# Patient Record
Sex: MALE | Race: WHITE
[De-identification: names, ages, dates, MRNs, and addresses within clinical notes are randomized per-mention and may not be internally consistent; named-entity substitution may affect disease eponyms.]

---

## 2018-05-05 ENCOUNTER — HOSPITAL ENCOUNTER (INPATIENT)
Dept: HOSPITAL 63 - GEROPSY | Age: 73
LOS: 15 days | Discharge: ANOTHER HOSPITAL | DRG: 884 | End: 2018-05-20
Attending: PSYCHIATRY & NEUROLOGY | Admitting: PSYCHIATRY & NEUROLOGY
Payer: MEDICARE

## 2018-05-05 VITALS — BODY MASS INDEX: 19.91 KG/M2 | WEIGHT: 131.38 LBS | HEIGHT: 68 IN

## 2018-05-05 VITALS — SYSTOLIC BLOOD PRESSURE: 146 MMHG | DIASTOLIC BLOOD PRESSURE: 90 MMHG

## 2018-05-05 DIAGNOSIS — N40.0: ICD-10-CM

## 2018-05-05 DIAGNOSIS — F51.4: ICD-10-CM

## 2018-05-05 DIAGNOSIS — F41.9: ICD-10-CM

## 2018-05-05 DIAGNOSIS — F01.51: Primary | ICD-10-CM

## 2018-05-05 DIAGNOSIS — F31.64: ICD-10-CM

## 2018-05-05 DIAGNOSIS — G95.9: ICD-10-CM

## 2018-05-05 DIAGNOSIS — F60.3: ICD-10-CM

## 2018-05-05 DIAGNOSIS — F63.9: ICD-10-CM

## 2018-05-05 DIAGNOSIS — G47.00: ICD-10-CM

## 2018-05-05 DIAGNOSIS — K59.09: ICD-10-CM

## 2018-05-05 DIAGNOSIS — L08.9: ICD-10-CM

## 2018-05-05 DIAGNOSIS — G30.0: ICD-10-CM

## 2018-05-05 DIAGNOSIS — F05: ICD-10-CM

## 2018-05-05 DIAGNOSIS — E03.9: ICD-10-CM

## 2018-05-05 DIAGNOSIS — Z79.899: ICD-10-CM

## 2018-05-05 DIAGNOSIS — F02.81: ICD-10-CM

## 2018-05-05 DIAGNOSIS — Z88.0: ICD-10-CM

## 2018-05-05 PROCEDURE — 80178 ASSAY OF LITHIUM: CPT

## 2018-05-05 PROCEDURE — 82947 ASSAY GLUCOSE BLOOD QUANT: CPT

## 2018-05-05 PROCEDURE — 83540 ASSAY OF IRON: CPT

## 2018-05-05 PROCEDURE — 74018 RADEX ABDOMEN 1 VIEW: CPT

## 2018-05-05 PROCEDURE — 36415 COLL VENOUS BLD VENIPUNCTURE: CPT

## 2018-05-05 PROCEDURE — 82533 TOTAL CORTISOL: CPT

## 2018-05-05 PROCEDURE — 87086 URINE CULTURE/COLONY COUNT: CPT

## 2018-05-05 PROCEDURE — 86593 SYPHILIS TEST NON-TREP QUANT: CPT

## 2018-05-05 PROCEDURE — 80048 BASIC METABOLIC PNL TOTAL CA: CPT

## 2018-05-05 PROCEDURE — 83605 ASSAY OF LACTIC ACID: CPT

## 2018-05-05 PROCEDURE — 93005 ELECTROCARDIOGRAM TRACING: CPT

## 2018-05-05 PROCEDURE — 83036 HEMOGLOBIN GLYCOSYLATED A1C: CPT

## 2018-05-05 PROCEDURE — 84436 ASSAY OF TOTAL THYROXINE: CPT

## 2018-05-05 PROCEDURE — 85007 BL SMEAR W/DIFF WBC COUNT: CPT

## 2018-05-05 PROCEDURE — 36600 WITHDRAWAL OF ARTERIAL BLOOD: CPT

## 2018-05-05 PROCEDURE — 82803 BLOOD GASES ANY COMBINATION: CPT

## 2018-05-05 PROCEDURE — 80061 LIPID PANEL: CPT

## 2018-05-05 PROCEDURE — 85025 COMPLETE CBC W/AUTO DIFF WBC: CPT

## 2018-05-05 PROCEDURE — 83550 IRON BINDING TEST: CPT

## 2018-05-05 PROCEDURE — 84443 ASSAY THYROID STIM HORMONE: CPT

## 2018-05-05 PROCEDURE — 87040 BLOOD CULTURE FOR BACTERIA: CPT

## 2018-05-05 PROCEDURE — 83735 ASSAY OF MAGNESIUM: CPT

## 2018-05-05 PROCEDURE — 80053 COMPREHEN METABOLIC PANEL: CPT

## 2018-05-05 PROCEDURE — 81001 URINALYSIS AUTO W/SCOPE: CPT

## 2018-05-05 PROCEDURE — 82306 VITAMIN D 25 HYDROXY: CPT

## 2018-05-05 PROCEDURE — 82607 VITAMIN B-12: CPT

## 2018-05-05 PROCEDURE — 84480 ASSAY TRIIODOTHYRONINE (T3): CPT

## 2018-05-05 RX ADMIN — FAMOTIDINE SCH MG: 20 TABLET ORAL at 18:31

## 2018-05-05 RX ADMIN — BACLOFEN SCH MG: 10 TABLET ORAL at 20:18

## 2018-05-05 RX ADMIN — AMITRIPTYLINE HYDROCHLORIDE SCH MG: 75 TABLET, FILM COATED ORAL at 20:18

## 2018-05-05 RX ADMIN — SENNOSIDES A AND B SCH MG: 8.6 TABLET, FILM COATED ORAL at 20:18

## 2018-05-05 RX ADMIN — LITHIUM CARBONATE SCH MG: 300 TABLET ORAL at 20:18

## 2018-05-05 NOTE — PDOC
Exam


Note:


Shai Note:


Please also refer to the separate dictated note~for this date of service 

dictated separately.~Patient seen individually. Discussed the patient with 

Nursing staff reviewed the chart.~Reviewed interim history and current 

functioning. Reviewed vital signs,~Labs/ Radiology~and current medications 

noted below. Continue current treatment with the changes noted in the dictated 

addendum note





Assessment:


Vital Signs:





 Vital Signs








  Date Time  Temp Pulse Resp B/P (MAP) Pulse Ox O2 Delivery O2 Flow Rate FiO2


 


5/5/18 15:14 98.1 73 18 146/90 (108) 96   











Current Medications:


Meds:





Current Medications


Multi-Ingredient Ointment (Analgesic Balm) 1 bob PRN QID  PRN TP MUSCLE PAIN;  

Start 5/5/18 at 15:00


Acetaminophen (Tylenol) 650 mg PRN Q6HRS  PRN PO PAIN / TEMP;  Start 5/5/18 at 

15:30


Baclofen (Lioresal) 10 mg BID PO  Last administered on 5/5/18at 20:18;  Start 5/ 5/18 at 21:00


Bisacodyl (Dulcolax Supp) 10 mg PRN DAILY  PRN RC CONSTIPATION;  Start 5/5/18 

at 15:30


Docusate Sodium (Colace) 100 mg PRN BID  PRN PO CONSTIPATION;  Start 5/5/18 at 

15:30


Famotidine (Pepcid) 20 mg BIDBFRMEAL PO  Last administered on 5/5/18at 18:31;  

Start 5/5/18 at 16:30


Finasteride (Proscar) 5 mg DAILY PO ;  Start 5/6/18 at 09:00


Haloperidol (Haldol) 0.5 mg PRN TID  PRN PO AGITATION;  Start 5/5/18 at 15:30


Lorazepam (Ativan) 0.5 mg TID PRN  PRN PO ANXIETY / AGITATION;  Start 5/5/18 at 

15:30


Ondansetron HCl (Zofran Odt) 4 mg PRN Q6HRS  PRN PO NAUSEA/VOMITING;  Start 5/5/ 18 at 15:30


Sennosides (Senna) 8.6 mg BID PO  Last administered on 5/5/18at 20:18;  Start 5/ 5/18 at 21:00


Sennosides (Senna) 8.6 mg PRN DAILY  PRN PO CONSTIPATION;  Start 5/5/18 at 15:30


Lithium Carbonate 300 mg BID PO  Last administered on 5/5/18at 20:18;  Start 5/5 /18 at 21:00


Oxycodone HCl (Roxicodone) 5 mg PRN Q6HRS  PRN PO PAIN;  Start 5/5/18 at 15:45


Terazosin HCl (Hytrin) 10 mg DAILY PO ;  Start 5/6/18 at 09:00


Amitriptyline HCl (Elavil) 75 mg QHS PO  Last administered on 5/5/18at 20:18;  

Start 5/5/18 at 21:00


Cetirizine HCl (ZyrTEC) 10 mg DAILY PO ;  Start 5/6/18 at 09:00


Olanzapine (ZyPREXA ZYDIS) 2.5 mg PRN Q2HR  PRN PO Psychosis;  Start 5/5/18 at 

15:45


Multi-Ingredient Ointment (Analgesic Balm) 1 bob PRN QID  PRN TP MUSCLE PAIN;  

Start 5/5/18 at 15:45


Al Hydroxide/Mg Hydroxide (Mylanta Plus Xs) 15 ml PRN AFTMEALHC  PRN PO 

DYSPEPSIA;  Start 5/5/18 at 15:45


Magnesium Hydroxide (Milk Of Magnesia) 2,400 mg PRN QHS  PRN PO CONSTIPATION;  

Start 5/5/18 at 15:45


Enoxaparin Sodium (Lovenox) 40 mg Q24H SQ  Last administered on 5/5/18at 20:19;

  Start 5/5/18 at 21:00





Active Scripts


Active


Reported


Amitriptyline Hcl 75 Mg Tablet 75 Mg PO QHS


Oxycodone Hcl 5 Mg Capsule 5 Mg PO PRN Q6HRS PRN


Lorazepam 0.5 Mg Tablet 0.5 Mg PO TID PRN PRN


Famotidine 20 Mg Tablet 20 Mg PO BIDBFRMEAL


Haloperidol 2 Mg Tablet 0.5 Mg PO PRN TID PRN


Terazosin Hcl 10 Mg Capsule 10 Mg PO DAILY


Ondansetron Odt (Ondansetron) 4 Mg Tab.rapdis 4 Mg PO PRN Q6HRS


Milk Of Magnesia (Magnesium Hydroxide) 2,400 Mg/10 Ml Oral.susp 2,400 Mg PO PRN 

QID PRN


Loratadine 10 Mg Tablet 10 Mg PO 


Lithium Carbonate 300 Mg Capsule 300 Mg PO BID


Baclofen 10 Mg Tablet 10 Mg PO BID


Maalox Maximum Strength Susp (Mag Hydrox/Al Hydrox/Simeth) 355 Ml Oral.susp 15 

Ml PO PRN Q6HRS PRN


Finasteride 5 Mg Tablet 5 Mg PO DAILY


Colace (Docusate Sodium) 100 Mg Capsule 100 Mg PO PRN BID PRN


Senokot (Sennosides) 8.6 Mg Tablet 8.6 Mg PO PRN DAILY PRN


Senokot (Sennosides) 8.6 Mg Tablet 8.6 Mg PO BID


Bisacodyl 10 Mg Supp.rect 10 Mg RC PRN DAILY PRN


Tylenol (Acetaminophen) 325 Mg Tablet 650 Mg PO PRN Q6HRS PRN


I have reviewed the current psychotropics carefully including drug 

interactions.  Risk benefit ratio favors no change other than as noted in my 

dictated progress note.





Diagnosis:


Problems:  


(1) Anxiety disorder


(2) Bipolar affective, mixed, sev w/ psych


(3) Dementia, vascular, with delusions


(4) Mild cognitive impairment











DIANA STALLINGS MD May 5, 2018 22:54

## 2018-05-06 VITALS — DIASTOLIC BLOOD PRESSURE: 71 MMHG | SYSTOLIC BLOOD PRESSURE: 138 MMHG

## 2018-05-06 VITALS — SYSTOLIC BLOOD PRESSURE: 132 MMHG | DIASTOLIC BLOOD PRESSURE: 73 MMHG

## 2018-05-06 LAB
ALBUMIN SERPL-MCNC: 3.4 G/DL (ref 3.4–5)
ALBUMIN/GLOB SERPL: 0.6 {RATIO} (ref 1–1.7)
ALP SERPL-CCNC: 111 U/L (ref 46–116)
ALT SERPL-CCNC: 53 U/L (ref 16–63)
ANION GAP SERPL CALC-SCNC: 9 MMOL/L (ref 6–14)
AST SERPL-CCNC: 32 U/L (ref 15–37)
BASOPHILS # BLD AUTO: 0 X10^3/UL (ref 0–0.2)
BASOPHILS NFR BLD: 1 % (ref 0–3)
BILIRUB SERPL-MCNC: 0.7 MG/DL (ref 0.2–1)
BUN/CREAT SERPL: 12 (ref 6–20)
CA-I SERPL ISE-MCNC: 22 MG/DL (ref 8–26)
CALCIUM SERPL-MCNC: 9.7 MG/DL (ref 8.5–10.1)
CHLORIDE SERPL-SCNC: 107 MMOL/L (ref 98–107)
CHOLEST SERPL-MCNC: 157 MG/DL (ref 0–200)
CHOLEST/HDLC SERPL: 3 {RATIO}
CO2 SERPL-SCNC: 26 MMOL/L (ref 21–32)
CREAT SERPL-MCNC: 1.9 MG/DL (ref 0.7–1.3)
EOSINOPHIL NFR BLD: 0.1 X10^3/UL (ref 0–0.7)
EOSINOPHIL NFR BLD: 2 % (ref 0–3)
ERYTHROCYTE [DISTWIDTH] IN BLOOD BY AUTOMATED COUNT: 15.7 % (ref 11.5–14.5)
GFR SERPLBLD BASED ON 1.73 SQ M-ARVRAT: 34.9 ML/MIN
GLOBULIN SER-MCNC: 5.3 G/DL (ref 2.2–3.8)
GLUCOSE SERPL-MCNC: 152 MG/DL (ref 70–99)
HCT VFR BLD CALC: 44.6 % (ref 39–53)
HDLC SERPL-MCNC: 41 MG/DL (ref 40–60)
HGB BLD-MCNC: 14.5 G/DL (ref 13–17.5)
LDLC: 101 MG/DL (ref 0–100)
LITHIUM SERPL-SCNC: 1.1 MMOL/L (ref 0.6–1.2)
LYMPHOCYTES # BLD: 2.5 X10^3/UL (ref 1–4.8)
LYMPHOCYTES NFR BLD AUTO: 30 % (ref 24–48)
MAGNESIUM SERPL-MCNC: 2.5 MG/DL (ref 1.8–2.4)
MCH RBC QN AUTO: 30 PG (ref 25–35)
MCHC RBC AUTO-ENTMCNC: 33 G/DL (ref 31–37)
MCV RBC AUTO: 91 FL (ref 79–100)
MONO #: 0.5 X10^3/UL (ref 0–1.1)
MONOCYTES NFR BLD: 6 % (ref 0–9)
NEUT #: 5 X10^3UL (ref 1.8–7.7)
NEUTROPHILS NFR BLD AUTO: 62 % (ref 31–73)
PLATELET # BLD AUTO: 249 X10^3/UL (ref 140–400)
POTASSIUM SERPL-SCNC: 4 MMOL/L (ref 3.5–5.1)
PROT SERPL-MCNC: 8.7 G/DL (ref 6.4–8.2)
RBC # BLD AUTO: 4.9 X10^6/UL (ref 4.3–5.7)
SODIUM SERPL-SCNC: 142 MMOL/L (ref 136–145)
T3 SERPL-MCNC: 67 NG/DL (ref 71–180)
T4 SERPL-MCNC: 7.6 UG/DL (ref 4.5–12)
THYROID STIM HORMONE (TSH): 0.22 UIU/ML (ref 0.36–3.74)
TRIGL SERPL-MCNC: 76 MG/DL (ref 0–150)
VLDLC: 15 MG/DL (ref 0–40)
WBC # BLD AUTO: 8.1 X10^3/UL (ref 4–11)

## 2018-05-06 RX ADMIN — SENNOSIDES A AND B SCH MG: 8.6 TABLET, FILM COATED ORAL at 20:00

## 2018-05-06 RX ADMIN — FAMOTIDINE SCH MG: 20 TABLET ORAL at 20:01

## 2018-05-06 RX ADMIN — TERAZOSIN HYDROCHLORIDE SCH MG: 5 CAPSULE ORAL at 08:17

## 2018-05-06 RX ADMIN — BACLOFEN SCH MG: 10 TABLET ORAL at 20:00

## 2018-05-06 RX ADMIN — FAMOTIDINE SCH MG: 20 TABLET ORAL at 08:14

## 2018-05-06 RX ADMIN — AMITRIPTYLINE HYDROCHLORIDE SCH MG: 75 TABLET, FILM COATED ORAL at 20:00

## 2018-05-06 RX ADMIN — CETIRIZINE HYDROCHLORIDE SCH MG: 10 TABLET, FILM COATED ORAL at 08:16

## 2018-05-06 RX ADMIN — BACLOFEN SCH MG: 10 TABLET ORAL at 08:14

## 2018-05-06 RX ADMIN — LITHIUM CARBONATE SCH MG: 300 TABLET ORAL at 08:14

## 2018-05-06 RX ADMIN — LITHIUM CARBONATE SCH MG: 300 TABLET ORAL at 20:00

## 2018-05-06 RX ADMIN — SENNOSIDES A AND B SCH MG: 8.6 TABLET, FILM COATED ORAL at 08:14

## 2018-05-06 RX ADMIN — ENOXAPARIN SODIUM SCH MG: 100 INJECTION SUBCUTANEOUS at 20:02

## 2018-05-06 RX ADMIN — FINASTERIDE SCH MG: 5 TABLET, FILM COATED ORAL at 08:17

## 2018-05-06 NOTE — PDOC
Exam


Note:


Shai Note:


Please also refer to the separate dictated note~for this date of service 

dictated separately.~Patient seen individually. Discussed the patient with 

Nursing staff reviewed the chart.~Reviewed interim history and current 

functioning. Reviewed vital signs,~Labs/ Radiology~and current medications 

noted below. Continue current treatment with the changes noted in the dictated 

addendum note





Assessment:


Vital Signs:





 Vital Signs








  Date Time  Temp Pulse Resp B/P (MAP) Pulse Ox O2 Delivery O2 Flow Rate FiO2


 


5/6/18 16:29 98.2 58 19 138/71 (93) 100   


 


5/6/18 09:16      Room Air  








I&O











Intake and Output 


 


 5/6/18





 07:00


 


Intake Total 360 ml


 


Output Total 1100 ml


 


Balance -740 ml


 


 


 


Intake Oral 360 ml


 


Output Urine Total 1100 ml








Labs:





 Laboratory Tests








Test


  5/6/18


09:23


 


White Blood Count


  8.1 x10^3/uL


(4.0-11.0)


 


Red Blood Count


  4.90 x10^6/uL


(4.30-5.70)


 


Hemoglobin


  14.5 g/dL


(13.0-17.5)


 


Hematocrit


  44.6 %


(39.0-53.0)


 


Mean Corpuscular Volume


  91 fL ()


 


 


Mean Corpuscular Hemoglobin 30 pg (25-35)  


 


Mean Corpuscular Hemoglobin


Concent 33 g/dL


(31-37)


 


Red Cell Distribution Width


  15.7 %


(11.5-14.5)  H


 


Platelet Count


  249 x10^3/uL


(140-400)


 


Neutrophils (%) (Auto) 62 % (31-73)  


 


Lymphocytes (%) (Auto) 30 % (24-48)  


 


Monocytes (%) (Auto) 6 % (0-9)  


 


Eosinophils (%) (Auto) 2 % (0-3)  


 


Basophils (%) (Auto) 1 % (0-3)  


 


Neutrophils # (Auto)


  5.0 x10^3uL


(1.8-7.7)


 


Lymphocytes # (Auto)


  2.5 x10^3/uL


(1.0-4.8)


 


Monocytes # (Auto)


  0.5 x10^3/uL


(0.0-1.1)


 


Eosinophils # (Auto)


  0.1 x10^3/uL


(0.0-0.7)


 


Basophils # (Auto)


  0.0 x10^3/uL


(0.0-0.2)


 


Sodium Level


  142 mmol/L


(136-145)


 


Potassium Level


  4.0 mmol/L


(3.5-5.1)


 


Chloride Level


  107 mmol/L


()


 


Carbon Dioxide Level


  26 mmol/L


(21-32)


 


Anion Gap 9 (6-14)  


 


Blood Urea Nitrogen


  22 mg/dL


(8-26)


 


Creatinine


  1.9 mg/dL


(0.7-1.3)  H


 


Estimated GFR


(Cockcroft-Gault) 34.9  


 


 


BUN/Creatinine Ratio 12 (6-20)  


 


Glucose Level


  152 mg/dL


(70-99)  H


 


Calcium Level


  9.7 mg/dL


(8.5-10.1)


 


Magnesium Level


  2.5 mg/dL


(1.8-2.4)  H


 


Iron Level


  61 ug/dL


()  L


 


Total Iron Binding Capacity


  240 ug/dL


(250-450)  L


 


Iron Saturation 25 % (15-34)  


 


Total Bilirubin


  0.7 mg/dL


(0.2-1.0)


 


Aspartate Amino Transferase


(AST) 32 U/L (15-37)


 


 


Alanine Aminotransferase (ALT)


  53 U/L (16-63)


 


 


Alkaline Phosphatase


  111 U/L


()


 


Total Protein


  8.7 g/dL


(6.4-8.2)  H


 


Albumin


  3.4 g/dL


(3.4-5.0)


 


Albumin/Globulin Ratio


  0.6 (1.0-1.7)


L


 


Triglycerides Level


  76 mg/dL


(0-150)


 


Cholesterol Level


  157 mg/dL


(0-200)


 


LDL Cholesterol, Calculated


  101 mg/dL


(0-100)  H


 


VLDL Cholesterol, Calculated


  15 mg/dL


(0-40)


 


Non-HDL Cholesterol Calculated


  116 mg/dL


(0-129)


 


HDL Cholesterol


  41 mg/dL


(40-60)


 


Cholesterol/HDL Ratio 3.0  


 


Thyroid Stimulating Hormone


(TSH) 0.222 uIU/mL


(0.358-3.740)


 


Lithium Level


  1.1 mmol/L


(0.6-1.2)


 


Lithium Last Dose Date 5/5/18  


 


Lithium Last Dose Time 2100  











Current Medications:


Meds:





Current Medications


Multi-Ingredient Ointment (Analgesic Balm) 1 bob PRN QID  PRN TP MUSCLE PAIN;  

Start 5/5/18 at 15:00


Acetaminophen (Tylenol) 650 mg PRN Q6HRS  PRN PO PAIN / TEMP;  Start 5/5/18 at 

15:30


Baclofen (Lioresal) 10 mg BID PO  Last administered on 5/6/18at 08:14;  Start 5/ 5/18 at 21:00


Bisacodyl (Dulcolax Supp) 10 mg PRN DAILY  PRN RC CONSTIPATION;  Start 5/5/18 

at 15:30


Docusate Sodium (Colace) 100 mg PRN BID  PRN PO CONSTIPATION;  Start 5/5/18 at 

15:30


Famotidine (Pepcid) 20 mg BIDBFRMEAL PO  Last administered on 5/6/18at 08:14;  

Start 5/5/18 at 16:30


Finasteride (Proscar) 5 mg DAILY PO  Last administered on 5/6/18at 08:17;  

Start 5/6/18 at 09:00


Haloperidol (Haldol) 0.5 mg PRN TID  PRN PO AGITATION;  Start 5/5/18 at 15:30


Lorazepam (Ativan) 0.5 mg TID PRN  PRN PO ANXIETY / AGITATION;  Start 5/5/18 at 

15:30


Ondansetron HCl (Zofran Odt) 4 mg PRN Q6HRS  PRN PO NAUSEA/VOMITING;  Start 5/5/ 18 at 15:30


Sennosides (Senna) 8.6 mg BID PO  Last administered on 5/6/18at 08:14;  Start 5/ 5/18 at 21:00


Sennosides (Senna) 8.6 mg PRN DAILY  PRN PO CONSTIPATION;  Start 5/5/18 at 15:30


Lithium Carbonate 300 mg BID PO  Last administered on 5/6/18at 08:14;  Start 5/5 /18 at 21:00


Oxycodone HCl (Roxicodone) 5 mg PRN Q6HRS  PRN PO PAIN Last administered on 5/6/ 18at 08:16;  Start 5/5/18 at 15:45


Terazosin HCl (Hytrin) 10 mg DAILY PO  Last administered on 5/6/18at 08:17;  

Start 5/6/18 at 09:00


Amitriptyline HCl (Elavil) 75 mg QHS PO  Last administered on 5/5/18at 20:18;  

Start 5/5/18 at 21:00


Cetirizine HCl (ZyrTEC) 10 mg DAILY PO  Last administered on 5/6/18at 08:16;  

Start 5/6/18 at 09:00


Olanzapine (ZyPREXA ZYDIS) 2.5 mg PRN Q2HR  PRN PO Psychosis;  Start 5/5/18 at 

15:45


Multi-Ingredient Ointment (Analgesic Balm) 1 bob PRN QID  PRN TP MUSCLE PAIN;  

Start 5/5/18 at 15:45


Al Hydroxide/Mg Hydroxide (Mylanta Plus Xs) 15 ml PRN AFTMEALHC  PRN PO 

DYSPEPSIA;  Start 5/5/18 at 15:45


Magnesium Hydroxide (Milk Of Magnesia) 2,400 mg PRN QHS  PRN PO CONSTIPATION;  

Start 5/5/18 at 15:45


Enoxaparin Sodium (Lovenox) 40 mg Q24H SQ  Last administered on 5/5/18at 20:19;

  Start 5/5/18 at 21:00;  Stop 5/6/18 at 14:41;  Status DC


Enoxaparin Sodium (Lovenox) 30 mg Q24H SQ ;  Start 5/6/18 at 21:00





Active Scripts


Active


Reported


Amitriptyline Hcl 75 Mg Tablet 75 Mg PO QHS


Oxycodone Hcl 5 Mg Capsule 5 Mg PO PRN Q6HRS PRN


Lorazepam 0.5 Mg Tablet 0.5 Mg PO TID PRN PRN


Famotidine 20 Mg Tablet 20 Mg PO BIDBFRMEAL


Haloperidol 2 Mg Tablet 0.5 Mg PO PRN TID PRN


Terazosin Hcl 10 Mg Capsule 10 Mg PO DAILY


Ondansetron Odt (Ondansetron) 4 Mg Tab.rapdis 4 Mg PO PRN Q6HRS


Milk Of Magnesia (Magnesium Hydroxide) 2,400 Mg/10 Ml Oral.susp 2,400 Mg PO PRN 

QID PRN


Loratadine 10 Mg Tablet 10 Mg PO 


Lithium Carbonate 300 Mg Capsule 300 Mg PO BID


Baclofen 10 Mg Tablet 10 Mg PO BID


Maalox Maximum Strength Susp (Mag Hydrox/Al Hydrox/Simeth) 355 Ml Oral.susp 15 

Ml PO PRN Q6HRS PRN


Finasteride 5 Mg Tablet 5 Mg PO DAILY


Colace (Docusate Sodium) 100 Mg Capsule 100 Mg PO PRN BID PRN


Senokot (Sennosides) 8.6 Mg Tablet 8.6 Mg PO PRN DAILY PRN


Senokot (Sennosides) 8.6 Mg Tablet 8.6 Mg PO BID


Bisacodyl 10 Mg Supp.rect 10 Mg RC PRN DAILY PRN


Tylenol (Acetaminophen) 325 Mg Tablet 650 Mg PO PRN Q6HRS PRN


I have reviewed the current psychotropics carefully including drug 

interactions.  Risk benefit ratio favors no change other than as noted in my 

dictated progress note.





Diagnosis:


Problems:  


(1) Mild cognitive impairment


(2) Dementia, vascular, with delusions


(3) Bipolar affective, mixed, sev w/ psych


(4) Anxiety disorder











DIANA STALLINGS MD May 6, 2018 18:41

## 2018-05-06 NOTE — HP
ADMIT DATE:  05/05/2018



PSYCHIATRIC ADMISSION HISTORY AND EVALUATION



This note covers elements not covered in my initial note of 05/05/2018.  

The patient was seen individually evening of 05/05/2018.  Discussed with nursing

staff, reviewed the chart, previously discussed with nursing staff on a couple

of occasions after the patient was referred to us from [facility], where he presented from [facility] on account of

"screaming for 45 minutes at the ghost trying to get into bed with him."



Reportedly, the patient has been increasingly confused, disoriented and seems to

be telling the ghost to keep quiet when staff are attempting to talk to him. 

Haldol was initiated at the nursing facility, was not effective.  Ativan

started, also seemed to have failed.  He was referred to the ER, found to be

extremely psychotic, confused within the context of his past diagnosis of

bipolar disorder with worsening mood swings and referred for inpatient

psychiatric stabilization.



CHIEF COMPLAINT:  "Ghost comes."  The patient was clenching his fists

restless, anxious, somewhat paranoid, delusional as I met with him, quite

apprehensive, seems scared of his psychotic symptoms.



HISTORY OF PRESENT ILLNESS:  The patient has a history of bipolar disorder with

periods of elation, racing thoughts alternating with depression.  He has had

significant insomnia recently with worsening mood swings, worsening confusion. 

He has been at the above nursing facility and has failed interventions while

there.  Behaviors have been deemed dangerous, unmanageable resulting in this

referral to the ER and then to us.  No active suicidal or homicidal ideation.



PAST PSYCHIATRIC HISTORY:  As above.



PAST MEDICAL HISTORY:  Positive for hypothyroidism, chronic constipation, and

history of borderline personality disorder.



DRUG ALLERGIES:  PENICILLIN.



CODE STATUS:  Full code.  



ACCU-CHEKS:  None.



DIET:  Regular, takes his medications crushed.



Ambulates with wheelchair.



CURRENT PSYCHOTROPICS:  Lithium carbonate 300 mg b.i.d., Zyprexa was added

p.r.n., Ativan p.r.n., Haldol 0.5 mg t.i.d. p.r.n., amitriptyline 75 mg at

bedtime, and Roxicodone p.r.n.



FAMILY HISTORY:  Noncontributory.



SOCIAL HISTORY:  No alcohol, drug abuse, physical, sexual or elder abuse history

is noted.  Not known to be a perpetrator.   The patient states he used to work

for the [employer].  He states he lives at home with his wife and she does the

driving, cooking, and grocery shopping.  In fact, he has been living at [facility] as noted above.



MENTAL STATUS EXAMINATION:  The patient was seen individually evening of

05/05/2018.  He is in his wheelchair, anxious, clenching his fist, quite

paranoid, psychotic.  Insight limited, judgment marginal, language function

intact.  Mood and affect remain labile.  The patient was able to tell me the

year is 2018, was unsure of the month and the date, however.  He did know he

came here today earlier in the day.



REACTION TO THE HOSPIALIZATION:  The patient accepting of it.



ASSETS:  Stable living at the nursing home.



IMPRESSION:  Bipolar 1 disorder, mixed with psychotic features; cognitive

disorder, unspecified versus major neurocognitive disorder; early Alzheimer,

vascular with delusion; anxiety disorder, unspecified; impulse control disorder,

unspecified.  Rest as noted above.



TREATMENT PLAN:  Admit to Geropsychiatry Unit at [facility].  I will

see the patient daily individually from a psychiatric standpoint, medical

followup per Dr. Hodges/Dr. Wallace.  We will check a lithium level, adjust to

reach a therapeutic level.  Continue rest unchanged.  May need to reduce the

amitriptyline, since it could be worsening some of his bipolar symptoms.  We may

need a scheduled atypical antipsychotic, but we make those decisions post

baseline assessment.





______________________________

DIANA STALLINGS MD



DR:  [email or web address]  JOB#:  [id number] / [id number]

DD:  05/05/2018 20:19  DT:  05/06/2018 00:01

## 2018-05-07 VITALS — DIASTOLIC BLOOD PRESSURE: 73 MMHG | SYSTOLIC BLOOD PRESSURE: 124 MMHG

## 2018-05-07 VITALS — SYSTOLIC BLOOD PRESSURE: 147 MMHG | DIASTOLIC BLOOD PRESSURE: 61 MMHG

## 2018-05-07 LAB
ANION GAP SERPL CALC-SCNC: 6 MMOL/L (ref 6–14)
APTT PPP: YELLOW S
BACTERIA #/AREA URNS HPF: (no result) /HPF
BILIRUB UR QL STRIP: (no result)
CA-I SERPL ISE-MCNC: 21 MG/DL (ref 8–26)
CALCIUM SERPL-MCNC: 9.5 MG/DL (ref 8.5–10.1)
CHLORIDE SERPL-SCNC: 110 MMOL/L (ref 98–107)
CO2 SERPL-SCNC: 27 MMOL/L (ref 21–32)
CREAT SERPL-MCNC: 1.9 MG/DL (ref 0.7–1.3)
FIBRINOGEN PPP-MCNC: (no result) MG/DL
GFR SERPLBLD BASED ON 1.73 SQ M-ARVRAT: 34.9 ML/MIN
GLUCOSE SERPL-MCNC: 141 MG/DL (ref 70–99)
GLUCOSE UR STRIP-MCNC: (no result) MG/DL
HBA1C MFR BLD: 5.1 % (ref 4.8–5.6)
NITRITE UR QL STRIP: (no result)
POTASSIUM SERPL-SCNC: 3.8 MMOL/L (ref 3.5–5.1)
RBC #/AREA URNS HPF: (no result) /HPF (ref 0–2)
SODIUM SERPL-SCNC: 143 MMOL/L (ref 136–145)
SP GR UR STRIP: 1.01
SQUAMOUS #/AREA URNS LPF: (no result) /LPF
UROBILINOGEN UR-MCNC: 0.2 MG/DL
WBC #/AREA URNS HPF: >40 /HPF (ref 0–4)

## 2018-05-07 RX ADMIN — FAMOTIDINE SCH MG: 20 TABLET ORAL at 08:55

## 2018-05-07 RX ADMIN — FAMOTIDINE SCH MG: 20 TABLET ORAL at 17:06

## 2018-05-07 RX ADMIN — CETIRIZINE HYDROCHLORIDE SCH MG: 10 TABLET, FILM COATED ORAL at 08:56

## 2018-05-07 RX ADMIN — SENNOSIDES A AND B SCH MG: 8.6 TABLET, FILM COATED ORAL at 08:56

## 2018-05-07 RX ADMIN — BACLOFEN SCH MG: 10 TABLET ORAL at 19:44

## 2018-05-07 RX ADMIN — HALOPERIDOL PRN MG: 2 TABLET ORAL at 08:57

## 2018-05-07 RX ADMIN — FINASTERIDE SCH MG: 5 TABLET, FILM COATED ORAL at 08:55

## 2018-05-07 RX ADMIN — BACLOFEN SCH MG: 10 TABLET ORAL at 08:55

## 2018-05-07 RX ADMIN — ENOXAPARIN SODIUM SCH MG: 100 INJECTION SUBCUTANEOUS at 19:45

## 2018-05-07 RX ADMIN — LITHIUM CARBONATE SCH MG: 300 TABLET ORAL at 08:55

## 2018-05-07 RX ADMIN — TERAZOSIN HYDROCHLORIDE SCH MG: 5 CAPSULE ORAL at 08:55

## 2018-05-07 RX ADMIN — SENNOSIDES A AND B SCH MG: 8.6 TABLET, FILM COATED ORAL at 19:43

## 2018-05-07 RX ADMIN — RISPERIDONE SCH MG: 0.5 TABLET ORAL at 19:46

## 2018-05-07 RX ADMIN — HALOPERIDOL PRN MG: 2 TABLET ORAL at 23:21

## 2018-05-07 RX ADMIN — LITHIUM CARBONATE SCH MG: 300 TABLET ORAL at 19:43

## 2018-05-07 RX ADMIN — HALOPERIDOL PRN MG: 2 TABLET ORAL at 13:07

## 2018-05-07 NOTE — PDOC
Exam


Note:


Shai Note:


Please also refer to the separate dictated note~for this date of service 

dictated separately.~Patient seen individually. Discussed the patient with 

Nursing staff reviewed the chart.~Reviewed interim history and current 

functioning. Reviewed vital signs,~Labs/ Radiology~and current medications 

noted below. Continue current treatment with the changes noted in the dictated 

addendum note





Assessment:


Vital Signs:





 Vital Signs








  Date Time  Temp Pulse Resp B/P (MAP) Pulse Ox O2 Delivery O2 Flow Rate FiO2


 


5/7/18 16:27 97.7 70 16 124/73 (90) 98   


 


5/6/18 09:16      Room Air  








I&O











Intake and Output 


 


 5/7/18





 07:00


 


Intake Total 1140 ml


 


Output Total 1125 ml


 


Balance 15 ml


 


 


 


Intake Oral 1140 ml


 


Output Urine Total 1125 ml








Labs:





 Laboratory Tests








Test


  5/7/18


04:51 5/7/18


09:39


 


Urine Collection Type Unknown   


 


Urine Color Yellow   


 


Urine Clarity Hazy   


 


Urine pH 6.5   


 


Urine Specific Gravity 1.010   


 


Urine Protein


  Neg


(NEG-TRACE) 


 


 


Urine Glucose (UA)


  Neg mg/dL


(NEG) 


 


 


Urine Ketones (Stick)


  Neg mg/dL


(NEG) 


 


 


Urine Blood Small (NEG)   


 


Urine Nitrite Neg (NEG)   


 


Urine Bilirubin Neg (NEG)   


 


Urine Urobilinogen Dipstick


  0.2 mg/dL (0.2


mg/dL) 


 


 


Urine Leukocyte Esterase Large (NEG)   


 


Urine RBC


  Occ /HPF (0-2)


  


 


 


Urine WBC


  >40 /HPF (0-4)


  


 


 


Urine Squamous Epithelial


Cells None /LPF  


  


 


 


Urine Bacteria


  Few /HPF


(0-FEW) 


 


 


Sodium Level


  


  143 mmol/L


(136-145)


 


Potassium Level


  


  3.8 mmol/L


(3.5-5.1)


 


Chloride Level


  


  110 mmol/L


()  H


 


Carbon Dioxide Level


  


  27 mmol/L


(21-32)


 


Anion Gap  6 (6-14)  


 


Blood Urea Nitrogen


  


  21 mg/dL


(8-26)


 


Creatinine


  


  1.9 mg/dL


(0.7-1.3)  H


 


Estimated GFR


(Cockcroft-Gault) 


  34.9  


 


 


Glucose Level


  


  141 mg/dL


(70-99)  H


 


Calcium Level


  


  9.5 mg/dL


(8.5-10.1)











Current Medications:


Meds:





Current Medications


Multi-Ingredient Ointment (Analgesic Balm) 1 bob PRN QID  PRN TP MUSCLE PAIN;  

Start 5/5/18 at 15:00


Acetaminophen (Tylenol) 650 mg PRN Q6HRS  PRN PO PAIN / TEMP;  Start 5/5/18 at 

15:30


Baclofen (Lioresal) 10 mg BID PO  Last administered on 5/7/18at 19:44;  Start 5/ 5/18 at 21:00


Bisacodyl (Dulcolax Supp) 10 mg PRN DAILY  PRN RC CONSTIPATION;  Start 5/5/18 

at 15:30


Docusate Sodium (Colace) 100 mg PRN BID  PRN PO CONSTIPATION;  Start 5/5/18 at 

15:30


Famotidine (Pepcid) 20 mg BIDBFRMEAL PO  Last administered on 5/7/18at 17:06;  

Start 5/5/18 at 16:30


Finasteride (Proscar) 5 mg DAILY PO  Last administered on 5/7/18at 08:55;  

Start 5/6/18 at 09:00


Haloperidol (Haldol) 0.5 mg PRN TID  PRN PO AGITATION Last administered on 5/7/ 18at 13:07;  Start 5/5/18 at 15:30


Lorazepam (Ativan) 0.5 mg TID PRN  PRN PO ANXIETY / AGITATION Last administered 

on 5/7/18at 13:07;  Start 5/5/18 at 15:30


Ondansetron HCl (Zofran Odt) 4 mg PRN Q6HRS  PRN PO NAUSEA/VOMITING;  Start 5/5/ 18 at 15:30


Sennosides (Senna) 8.6 mg BID PO  Last administered on 5/7/18at 19:43;  Start 5/ 5/18 at 21:00


Sennosides (Senna) 8.6 mg PRN DAILY  PRN PO CONSTIPATION;  Start 5/5/18 at 15:30


Lithium Carbonate 300 mg BID PO  Last administered on 5/7/18at 19:43;  Start 5/5 /18 at 21:00


Oxycodone HCl (Roxicodone) 5 mg PRN Q6HRS  PRN PO PAIN Last administered on 5/6/ 18at 08:16;  Start 5/5/18 at 15:45


Terazosin HCl (Hytrin) 10 mg DAILY PO  Last administered on 5/7/18at 08:55;  

Start 5/6/18 at 09:00


Amitriptyline HCl (Elavil) 75 mg QHS PO  Last administered on 5/6/18at 20:00;  

Start 5/5/18 at 21:00;  Stop 5/7/18 at 19:22;  Status DC


Cetirizine HCl (ZyrTEC) 10 mg DAILY PO  Last administered on 5/7/18at 08:56;  

Start 5/6/18 at 09:00


Olanzapine (ZyPREXA ZYDIS) 2.5 mg PRN Q2HR  PRN PO Psychosis;  Start 5/5/18 at 

15:45


Multi-Ingredient Ointment (Analgesic Balm) 1 bob PRN QID  PRN TP MUSCLE PAIN;  

Start 5/5/18 at 15:45;  Status Cancel


Al Hydroxide/Mg Hydroxide (Mylanta Plus Xs) 15 ml PRN AFTMEALHC  PRN PO 

DYSPEPSIA;  Start 5/5/18 at 15:45


Magnesium Hydroxide (Milk Of Magnesia) 2,400 mg PRN QHS  PRN PO CONSTIPATION;  

Start 5/5/18 at 15:45


Enoxaparin Sodium (Lovenox) 40 mg Q24H SQ  Last administered on 5/5/18at 20:19;

  Start 5/5/18 at 21:00;  Stop 5/6/18 at 14:41;  Status DC


Enoxaparin Sodium (Lovenox) 30 mg Q24H SQ  Last administered on 5/7/18at 19:45;

  Start 5/6/18 at 21:00


Risperidone (RisperDAL) 0.25 mg QHS PO  Last administered on 5/6/18at 20:00;  

Start 5/6/18 at 21:00;  Stop 5/7/18 at 19:22;  Status DC


Vitamin D (Vitamin D3) 50,000 unit WEEKLY PO ;  Start 5/8/18 at 09:00


Amitriptyline HCl (Elavil) 50 mg QHS PO  Last administered on 5/7/18at 19:46;  

Start 5/7/18 at 21:00


Risperidone (RisperDAL) 0.5 mg QHS PO  Last administered on 5/7/18at 19:46;  

Start 5/7/18 at 21:00





Active Scripts


Active


Reported


Amitriptyline Hcl 75 Mg Tablet 75 Mg PO QHS


Oxycodone Hcl 5 Mg Capsule 5 Mg PO PRN Q6HRS PRN


Lorazepam 0.5 Mg Tablet 0.5 Mg PO TID PRN PRN


Famotidine 20 Mg Tablet 20 Mg PO BIDBFRMEAL


Haloperidol 2 Mg Tablet 0.5 Mg PO PRN TID PRN


Terazosin Hcl 10 Mg Capsule 10 Mg PO DAILY


Ondansetron Odt (Ondansetron) 4 Mg Tab.rapdis 4 Mg PO PRN Q6HRS


Milk Of Magnesia (Magnesium Hydroxide) 2,400 Mg/10 Ml Oral.susp 2,400 Mg PO PRN 

QID PRN


Loratadine 10 Mg Tablet 10 Mg PO 


Lithium Carbonate 300 Mg Capsule 300 Mg PO BID


Baclofen 10 Mg Tablet 10 Mg PO BID


Maalox Maximum Strength Susp (Mag Hydrox/Al Hydrox/Simeth) 355 Ml Oral.susp 15 

Ml PO PRN Q6HRS PRN


Finasteride 5 Mg Tablet 5 Mg PO DAILY


Colace (Docusate Sodium) 100 Mg Capsule 100 Mg PO PRN BID PRN


Senokot (Sennosides) 8.6 Mg Tablet 8.6 Mg PO PRN DAILY PRN


Senokot (Sennosides) 8.6 Mg Tablet 8.6 Mg PO BID


Bisacodyl 10 Mg Supp.rect 10 Mg RC PRN DAILY PRN


Tylenol (Acetaminophen) 325 Mg Tablet 650 Mg PO PRN Q6HRS PRN


I have reviewed the current psychotropics carefully including drug 

interactions.  Risk benefit ratio favors no change other than as noted in my 

dictated progress note.





Diagnosis:


Problems:  


(1) Anxiety disorder


(2) Bipolar affective, mixed, sev w/ psych


(3) Dementia, vascular, with delusions


(4) Mild cognitive impairment











DIANA STALLINGS MD May 7, 2018 20:54

## 2018-05-07 NOTE — CONS
DATE OF CONSULTATION:  05/06/2018



REASON FOR CONSULTATION:  Medical management.



HISTORY OF PRESENT ILLNESS:  The patient is a 72-year-old  male

patient, a resident at [facility], who was seen originally to [facility] Emergency Room on account of screaming for 45 minutes that the

ghost trying to get into bet with him.  The patient apparently has been

increasingly confused, disoriented, seems to be telling the ghost to keep quiet

when staffs were attempting talk to him.  Haldol and subsequently Ativan tried

and failed.  He was referred to the ER, found to be extremely psychotic,

confused within the context of diagnosis of bipolar disorder with worsening mood

swings and referred for inpatient psychiatric stabilization.



PAST PSYCHIATRIC HISTORY:  Significant for bipolar disorder, periods of

elevation and racing thoughts alternating with depression, has significant

insomnia and worsening mood swings, worsening confusion.  His behavior has been

deemed dangerous, unmanageable, resulting in his referral to the Emergency Room

and then to [facility].  The patient did not have active suicidal or

homicidal ideation.



PAST MEDICAL HISTORY:  Significant for hypothyroidism, benign prostatic

hypertrophy, chronic constipation and cervical myelopathy.



ALLERGIES:  He is allergic to PENICILLIN.



MEDICATIONS:  He is currently on following medications:  He is on loratadine 10

mg once a day, baclofen 10 mg twice a day, terazosin 10 mg daily, oxycodone 5 mg

every 6 hours as needed, Tylenol 650 mg every 6 hours, amitriptyline 75 mg at

bedtime, Haldol 0.5 mg 3 times a day, lorazepam 0.5 mg 3 times a day.  He is on

lithium carbonate 300 mg twice a day, Maalox 15 mL every 6 hours as needed,

bisacodyl 10 mg rectally daily p.r.n. for constipation, Colace 100 mg twice a

day, milk of magnesia 30 mL p.o. daily p.r.n. for constipation, senna 1 tablet

twice a day, ondansetron 4 mg every 6 hours as needed, famotidine 20 mg twice a

day, finasteride 5 mg once a day.



FAMILY HISTORY:  Noncontributory.



SOCIAL HISTORY:  He is a resident at [facility].  He is retired from

railroad.  He states that he lives at home with his wife.  However, he has been

at [facility] facility.  He does not smoke, drink alcohol or use any

recreational drugs.



PHYSICAL EXAMINATION:

GENERAL:  When I examined him, he was resting, slightly propped up in bed, in no

apparent distress.  He is pale, somewhat cachectic, but no jaundice or cyanosis.

 No lymphadenopathy, no thyromegaly.  No jugular venous distension.  No lower

limb edema.

VITAL SIGNS:  His heart rate was 74, blood pressure 132/73, temperature was

97.8, respiratory rate was 18 and oxygen saturation was 96%.

HEAD, EYES, EARS, NOSE AND THROAT:  Showed normocephalic, atraumatic.

NECK:  Supple.

HEART:  Showed normal first and second sounds.  No gallop, rub or murmur.

CHEST:  Clear to auscultation.  No crepitation or rhonchi.

ABDOMEN:  Distended, soft, nontender.  No guarding or rigidity.  No

organomegaly.  All hernial orifices intact.  Bowel sounds normal.

NEUROLOGIC:  He was awake, alert.  All his cranial nerves are intact.  He has

marked muscle wasting, especially thenar and hypothenar consistent with severe

cervical myelopathy with marked muscle wasting.  He apparently is mostly

wheelchair bound.



LABORATORY DATA:  Showed white cell count of 8100, hemoglobin 14.5, hematocrit

44.6, MCV 91, and platelet count 249,000.  Her chemistry showed a serum sodium

142, potassium 4, chloride 107, bicarbonate 26, anion gap of 9, BUN 22,

creatinine 1.9, estimated GFR was 55 mL per minute, his glucose 152, calcium was

9.7, magnesium 2.5.  Total bilirubin, AST, ALT, alkaline phosphatase were

normal.  His calcium was 8.7.  Albumin was 3.4.



IMPRESSION:  So All in all, this is a 72-year-old  male patient, who

was admitted to [facility] on account of screaming for 45 minutes as

the ghost trying to get into bed with him.  He has also noted to be increasing

confusion and we have reviewed his vital signs, his lab work and all his

medications seem to be appropriate and he seemed to be medically stable except

the fact that his creatinine is high at 1.9 in the context of the cervical

myelopathy.  He probably is retaining urine.  Apparently a Gordon catheter was

already put in, which should hopefully help improve his kidney function. 

Eventually, I will review all the labs that are still pending at the time of

this dictation and make necessary recommendation.



Thank you, Dr. Dias for allowing me to participate in the care of this patient.





______________________________

SAMIR SCOTT MD



DR:  SHAWN/timbo  JOB#:  [id number] / [id number]

DD:  05/06/2018 14:10  DT:  05/07/2018 10:19

## 2018-05-07 NOTE — EKG
[facility] [address], [place] [address]

Test Date:    2018               Test Time:    14:15:46

Pat Name:     DILCIA STEINBERG              Department:   

Patient ID:   [id number]           Room:         [address] [place] [address]

Gender:       M                        Technician:   LESLEE

:          1945               Requested By: DIANA STALLINGS

Order Number: [id number]            Reading MD:   Jigar Fuller MD

                                 Measurements

Intervals                              Axis          

Rate:         70                       P:            0

OR:           250                      QRS:          24

QRSD:         82                       T:            43

QT:           452                                    

QTc:          491                                    

                           Interpretive Statements

SINUS RHYTHM

1ST DEGREE AVB

Electronically Signed On 2018 12:54:29 CDT by Jigar Fuller MD

## 2018-05-08 VITALS — DIASTOLIC BLOOD PRESSURE: 72 MMHG | SYSTOLIC BLOOD PRESSURE: 119 MMHG

## 2018-05-08 VITALS — DIASTOLIC BLOOD PRESSURE: 74 MMHG | SYSTOLIC BLOOD PRESSURE: 117 MMHG

## 2018-05-08 RX ADMIN — TERAZOSIN HYDROCHLORIDE SCH MG: 5 CAPSULE ORAL at 08:59

## 2018-05-08 RX ADMIN — CETIRIZINE HYDROCHLORIDE SCH MG: 10 TABLET, FILM COATED ORAL at 08:59

## 2018-05-08 RX ADMIN — CHOLECALCIFEROL CAP 1.25 MG (50000 UNIT) SCH UNIT: 1.25 CAP at 09:01

## 2018-05-08 RX ADMIN — FAMOTIDINE SCH MG: 20 TABLET ORAL at 11:17

## 2018-05-08 RX ADMIN — ENOXAPARIN SODIUM SCH MG: 100 INJECTION SUBCUTANEOUS at 20:40

## 2018-05-08 RX ADMIN — SENNOSIDES A AND B SCH MG: 8.6 TABLET, FILM COATED ORAL at 20:38

## 2018-05-08 RX ADMIN — FAMOTIDINE SCH MG: 20 TABLET ORAL at 16:30

## 2018-05-08 RX ADMIN — FINASTERIDE SCH MG: 5 TABLET, FILM COATED ORAL at 11:17

## 2018-05-08 RX ADMIN — CHOLECALCIFEROL CAP 1.25 MG (50000 UNIT) SCH UNIT: 1.25 CAP at 11:18

## 2018-05-08 RX ADMIN — FINASTERIDE SCH MG: 5 TABLET, FILM COATED ORAL at 08:57

## 2018-05-08 RX ADMIN — FAMOTIDINE SCH MG: 20 TABLET ORAL at 08:57

## 2018-05-08 RX ADMIN — CETIRIZINE HYDROCHLORIDE SCH MG: 10 TABLET, FILM COATED ORAL at 11:18

## 2018-05-08 RX ADMIN — BISACODYL PRN MG: 10 SUPPOSITORY RECTAL at 14:36

## 2018-05-08 RX ADMIN — BACLOFEN SCH MG: 10 TABLET ORAL at 08:57

## 2018-05-08 RX ADMIN — SENNOSIDES A AND B SCH MG: 8.6 TABLET, FILM COATED ORAL at 11:18

## 2018-05-08 RX ADMIN — LITHIUM CARBONATE SCH MG: 300 TABLET ORAL at 08:59

## 2018-05-08 RX ADMIN — BACLOFEN SCH MG: 10 TABLET ORAL at 20:38

## 2018-05-08 RX ADMIN — TRAZODONE HYDROCHLORIDE PRN MG: 50 TABLET, FILM COATED ORAL at 23:09

## 2018-05-08 RX ADMIN — SENNOSIDES A AND B SCH MG: 8.6 TABLET, FILM COATED ORAL at 08:59

## 2018-05-08 RX ADMIN — RISPERIDONE SCH MG: 0.5 TABLET ORAL at 20:38

## 2018-05-08 RX ADMIN — HALOPERIDOL PRN MG: 2 TABLET ORAL at 09:21

## 2018-05-08 RX ADMIN — BACLOFEN SCH MG: 10 TABLET ORAL at 11:17

## 2018-05-08 RX ADMIN — TERAZOSIN HYDROCHLORIDE SCH MG: 5 CAPSULE ORAL at 11:18

## 2018-05-08 RX ADMIN — LITHIUM CARBONATE SCH MG: 300 TABLET ORAL at 11:17

## 2018-05-08 RX ADMIN — MAGNESIUM HYDROXIDE PRN MG: 400 SUSPENSION ORAL at 11:19

## 2018-05-08 RX ADMIN — MAGNESIUM HYDROXIDE PRN MG: 400 SUSPENSION ORAL at 12:05

## 2018-05-08 NOTE — PN
DATE:  05/06/2018



This is a late entry for 05/06/2018 and covers the elements not covered in my

initial note of 05/06/2018.



SUBJECTIVE:  I met with the patient in the evening of 05/06/2018.  The patient

slept 6-3/4 hours previous evening, he was anxious at lunchtime, no

hallucinations in the morning, but around lunchtime he was actively

hallucinating, calling out to people who were not around him "Hey, Hey, you

there."  He seemed to be hearing backward this people were responding.



REVIEW OF SYSTEMS:  Ambulation impaired.  No CV, , pulmonary, eye, ENT system

symptoms on review.  Reliability varies.



MENTAL STATUS EXAM:  Oriented to himself and situation.  Speech coherent, low in

volume, at times.  Abstraction fair, computation impaired, language function

intact.  Mood and affect remains labile and he remains actively psychotic.



LABORATORY DATA:  Reviewed.



IMPRESSION:  Bipolar 1 disorder, mixed with psychotic features; delirium,

unspecified.



PLAN:  Start Risperdal 0.25 mg p.o. at bedtime for his psychosis.  Check a

lithium level.  Rest unchanged.  May need to reduce the amitriptyline 75 mg p.o.

at bedtime since this could be worsening some of his bipolar symptoms.





______________________________

DIANA STALLINGS MD



DR:  BRIDGETT/timbo  JOB#:  [id number] / [id number]

DD:  05/07/2018 12:31  DT:  05/08/2018 02:54

## 2018-05-08 NOTE — PN
DATE:  05/07/2018



This is a late entry of 05/07/2018 covers elements not covered in my initial

note of 05/07/2018.



SUBJECTIVE:  I met with the patient in the evening of 05/07/2018 and I had been

called by the nursing staff earlier in the day as an emergency on account of his

marked agitation, mood lability and we added Ativan and Haldol.  He has been

quite psychotic, hallucinating, Ativan, Haldol, given at 09:00 a.m., 01:00 p.m.

agitated, grabbing at things around him, obsessed with having a coke with his

meals.  Slept 8-1/2 hours.



REVIEW OF SYSTEMS:  Ambulation impaired, in wheelchair.  No CV, , pulmonary,

eye, ENT system symptoms on review.  Seemed a little calmer with music running

in the background for him per nursing report.



MENTAL STATUS EXAM:  Oriented to himself and situation.  Speech coherent, has

some latency.  Abstraction fair, computation impaired, language function intact.

 Mood and affect remains somewhat labile.



LABORATORY DATA:  Reviewed.  UA, C and S is awaited.



IMPRESSION:  Bipolar 1 disorder, mixed with psychotic features, delirium,

unspecified.



PLAN:  Lithium level is 1.1.  Maintain lithium at current dosage.  Reduce

amitriptyline to 50 mg at bedtime in case this is worsening his mood lability,

increase Risperdal from 0.25 mg at bedtime to 0.5 mg at bedtime.  Continue rest

unchanged.





______________________________

MAN COBY STALLINGS MD



DR:  BRIDGETT/timbo  JOB#:  [id number] / [id number]

DD:  05/08/2018 12:23  DT:  05/08/2018 22:20

## 2018-05-08 NOTE — PDOC
Exam


Note:


Shai Note:


Please also refer to the separate dictated note~for this date of service 

dictated separately.~Patient seen individually. Discussed the patient with 

Nursing staff reviewed the chart.~Reviewed interim history and current 

functioning. Reviewed vital signs,~Labs/ Radiology~and current medications 

noted below. Continue current treatment with the changes noted in the dictated 

addendum note





Assessment:


Vital Signs:





 Vital Signs








  Date Time  Temp Pulse Resp B/P (MAP) Pulse Ox O2 Delivery O2 Flow Rate FiO2


 


5/8/18 16:10 98.0 86 18 119/72 (88) 95   


 


5/6/18 09:16      Room Air  








I&O











Intake and Output 


 


 5/8/18





 07:00


 


Intake Total 1730 ml


 


Output Total 1150 ml


 


Balance 580 ml


 


 


 


Intake Oral 1730 ml


 


Output Urine Total 1150 ml


 


# Voids 1








Labs:





 Laboratory Tests








Test


  5/8/18


07:39


 


Cortisol AM Sample


  18.36 ug/dL


(4.3-22.4)











Current Medications:


Meds:





Current Medications


Multi-Ingredient Ointment (Analgesic Balm) 1 bob PRN QID  PRN TP MUSCLE PAIN;  

Start 5/5/18 at 15:00


Acetaminophen (Tylenol) 650 mg PRN Q6HRS  PRN PO PAIN / TEMP;  Start 5/5/18 at 

15:30


Baclofen (Lioresal) 10 mg BID PO  Last administered on 5/8/18at 20:38;  Start 5/ 5/18 at 21:00


Bisacodyl (Dulcolax Supp) 10 mg PRN DAILY  PRN RC CONSTIPATION Last 

administered on 5/8/18at 14:36;  Start 5/5/18 at 15:30


Docusate Sodium (Colace) 100 mg PRN BID  PRN PO CONSTIPATION;  Start 5/5/18 at 

15:30


Famotidine (Pepcid) 20 mg BIDBFRMEAL PO  Last administered on 5/8/18at 11:17;  

Start 5/5/18 at 16:30


Finasteride (Proscar) 5 mg DAILY PO  Last administered on 5/8/18at 11:17;  

Start 5/6/18 at 09:00


Haloperidol (Haldol) 0.5 mg PRN TID  PRN PO AGITATION Last administered on 5/8/ 18at 09:21;  Start 5/5/18 at 15:30


Lorazepam (Ativan) 0.5 mg TID PRN  PRN PO ANXIETY / AGITATION Last administered 

on 5/8/18at 09:21;  Start 5/5/18 at 15:30


Ondansetron HCl (Zofran Odt) 4 mg PRN Q6HRS  PRN PO NAUSEA/VOMITING;  Start 5/5/ 18 at 15:30


Sennosides (Senna) 8.6 mg BID PO  Last administered on 5/8/18at 20:38;  Start 5/ 5/18 at 21:00


Sennosides (Senna) 8.6 mg PRN DAILY  PRN PO CONSTIPATION;  Start 5/5/18 at 15:30


Lithium Carbonate 300 mg BID PO  Last administered on 5/8/18at 11:17;  Start 5/5 /18 at 21:00;  Stop 5/8/18 at 13:52;  Status DC


Oxycodone HCl (Roxicodone) 5 mg PRN Q6HRS  PRN PO PAIN Last administered on 5/8/ 18at 12:04;  Start 5/5/18 at 15:45


Terazosin HCl (Hytrin) 10 mg DAILY PO  Last administered on 5/8/18at 11:18;  

Start 5/6/18 at 09:00


Amitriptyline HCl (Elavil) 75 mg QHS PO  Last administered on 5/6/18at 20:00;  

Start 5/5/18 at 21:00;  Stop 5/7/18 at 19:22;  Status DC


Cetirizine HCl (ZyrTEC) 10 mg DAILY PO  Last administered on 5/8/18at 11:18;  

Start 5/6/18 at 09:00


Olanzapine (ZyPREXA ZYDIS) 2.5 mg PRN Q2HR  PRN PO Psychosis;  Start 5/5/18 at 

15:45;  Stop 5/8/18 at 11:33;  Status DC


Multi-Ingredient Ointment (Analgesic Balm) 1 bob PRN QID  PRN TP MUSCLE PAIN;  

Start 5/5/18 at 15:45;  Status Cancel


Al Hydroxide/Mg Hydroxide (Mylanta Plus Xs) 15 ml PRN AFTMEALHC  PRN PO 

DYSPEPSIA;  Start 5/5/18 at 15:45


Magnesium Hydroxide (Milk Of Magnesia) 2,400 mg PRN QHS  PRN PO CONSTIPATION 

Last administered on 5/8/18at 12:05;  Start 5/5/18 at 15:45


Enoxaparin Sodium (Lovenox) 40 mg Q24H SQ  Last administered on 5/5/18at 20:19;

  Start 5/5/18 at 21:00;  Stop 5/6/18 at 14:41;  Status DC


Enoxaparin Sodium (Lovenox) 30 mg Q24H SQ  Last administered on 5/8/18at 20:40;

  Start 5/6/18 at 21:00


Risperidone (RisperDAL) 0.25 mg QHS PO  Last administered on 5/6/18at 20:00;  

Start 5/6/18 at 21:00;  Stop 5/7/18 at 19:22;  Status DC


Vitamin D (Vitamin D3) 50,000 unit WEEKLY PO  Last administered on 5/8/18at 11:

18;  Start 5/8/18 at 09:00


Amitriptyline HCl (Elavil) 50 mg QHS PO  Last administered on 5/7/18at 19:46;  

Start 5/7/18 at 21:00;  Stop 5/8/18 at 19:46;  Status DC


Risperidone (RisperDAL) 0.5 mg QHS PO  Last administered on 5/8/18at 20:38;  

Start 5/7/18 at 21:00


Olanzapine (ZyPREXA ZYDIS) 5 mg PRN Q2HR  PRN PO Psychosis Last administered on 

5/8/18at 12:05;  Start 5/8/18 at 11:45


Lithium Citrate 8 meq BID PO ;  Start 5/8/18 at 21:00;  Stop 5/8/18 at 21:00;  

Status DC


Trazodone HCl (Desyrel) 50 mg PRN QHS  PRN PO INSOMNIA;  Start 5/8/18 at 19:45





Active Scripts


Active


Reported


Amitriptyline Hcl 75 Mg Tablet 75 Mg PO QHS


Oxycodone Hcl 5 Mg Capsule 5 Mg PO PRN Q6HRS PRN


Lorazepam 0.5 Mg Tablet 0.5 Mg PO TID PRN PRN


Famotidine 20 Mg Tablet 20 Mg PO BIDBFRMEAL


Haloperidol 2 Mg Tablet 0.5 Mg PO PRN TID PRN


Terazosin Hcl 10 Mg Capsule 10 Mg PO DAILY


Ondansetron Odt (Ondansetron) 4 Mg Tab.rapdis 4 Mg PO PRN Q6HRS


Milk Of Magnesia (Magnesium Hydroxide) 2,400 Mg/10 Ml Oral.susp 2,400 Mg PO PRN 

QID PRN


Loratadine 10 Mg Tablet 10 Mg PO 


Lithium Carbonate 300 Mg Capsule 300 Mg PO BID


Baclofen 10 Mg Tablet 10 Mg PO BID


Maalox Maximum Strength Susp (Mag Hydrox/Al Hydrox/Simeth) 355 Ml Oral.susp 15 

Ml PO PRN Q6HRS PRN


Finasteride 5 Mg Tablet 5 Mg PO DAILY


Colace (Docusate Sodium) 100 Mg Capsule 100 Mg PO PRN BID PRN


Senokot (Sennosides) 8.6 Mg Tablet 8.6 Mg PO PRN DAILY PRN


Senokot (Sennosides) 8.6 Mg Tablet 8.6 Mg PO BID


Bisacodyl 10 Mg Supp.rect 10 Mg RC PRN DAILY PRN


Tylenol (Acetaminophen) 325 Mg Tablet 650 Mg PO PRN Q6HRS PRN


I have reviewed the current psychotropics carefully including drug 

interactions.  Risk benefit ratio favors no change other than as noted in my 

dictated progress note.





Diagnosis:


Problems:  


(1) Anxiety disorder


(2) Bipolar affective, mixed, sev w/ psych


(3) Dementia, vascular, with delusions


(4) Mild cognitive impairment











DIANA STALLINGS MD May 8, 2018 20:47

## 2018-05-09 VITALS — DIASTOLIC BLOOD PRESSURE: 70 MMHG | SYSTOLIC BLOOD PRESSURE: 93 MMHG

## 2018-05-09 LAB
ALBUMIN SERPL-MCNC: 2.9 G/DL (ref 3.4–5)
ALBUMIN/GLOB SERPL: 0.6 {RATIO} (ref 1–1.7)
ALP SERPL-CCNC: 92 U/L (ref 46–116)
ALT SERPL-CCNC: 30 U/L (ref 16–63)
ANION GAP SERPL CALC-SCNC: 11 MMOL/L (ref 6–14)
AST SERPL-CCNC: 16 U/L (ref 15–37)
BASOPHILS # BLD AUTO: 0.1 X10^3/UL (ref 0–0.2)
BASOPHILS NFR BLD: 1 % (ref 0–3)
BILIRUB SERPL-MCNC: 0.3 MG/DL (ref 0.2–1)
BUN/CREAT SERPL: 15 (ref 6–20)
CA-I SERPL ISE-MCNC: 28 MG/DL (ref 8–26)
CALCIUM SERPL-MCNC: 9.5 MG/DL (ref 8.5–10.1)
CHLORIDE SERPL-SCNC: 108 MMOL/L (ref 98–107)
CO2 SERPL-SCNC: 25 MMOL/L (ref 21–32)
CREAT SERPL-MCNC: 1.9 MG/DL (ref 0.7–1.3)
EOSINOPHIL NFR BLD: 0.2 X10^3/UL (ref 0–0.7)
EOSINOPHIL NFR BLD: 1 % (ref 0–3)
ERYTHROCYTE [DISTWIDTH] IN BLOOD BY AUTOMATED COUNT: 15.8 % (ref 11.5–14.5)
GFR SERPLBLD BASED ON 1.73 SQ M-ARVRAT: 34.9 ML/MIN
GLOBULIN SER-MCNC: 4.7 G/DL (ref 2.2–3.8)
GLUCOSE SERPL-MCNC: 161 MG/DL (ref 70–99)
HCT VFR BLD CALC: 40.8 % (ref 39–53)
HGB BLD-MCNC: 13.1 G/DL (ref 13–17.5)
LYMPHOCYTES # BLD: 2.6 X10^3/UL (ref 1–4.8)
LYMPHOCYTES NFR BLD AUTO: 24 % (ref 24–48)
MCH RBC QN AUTO: 29 PG (ref 25–35)
MCHC RBC AUTO-ENTMCNC: 32 G/DL (ref 31–37)
MCV RBC AUTO: 91 FL (ref 79–100)
MONO #: 1 X10^3/UL (ref 0–1.1)
MONOCYTES NFR BLD: 9 % (ref 0–9)
NEUT #: 7.3 X10^3UL (ref 1.8–7.7)
NEUTROPHILS NFR BLD AUTO: 66 % (ref 31–73)
PLATELET # BLD AUTO: 232 X10^3/UL (ref 140–400)
POTASSIUM SERPL-SCNC: 3.8 MMOL/L (ref 3.5–5.1)
PROT SERPL-MCNC: 7.6 G/DL (ref 6.4–8.2)
RBC # BLD AUTO: 4.49 X10^6/UL (ref 4.3–5.7)
SODIUM SERPL-SCNC: 144 MMOL/L (ref 136–145)
WBC # BLD AUTO: 11.1 X10^3/UL (ref 4–11)

## 2018-05-09 RX ADMIN — CETIRIZINE HYDROCHLORIDE SCH MG: 10 TABLET, FILM COATED ORAL at 11:32

## 2018-05-09 RX ADMIN — MIRTAZAPINE SCH MG: 7.5 TABLET, FILM COATED ORAL at 20:48

## 2018-05-09 RX ADMIN — BACLOFEN SCH MG: 10 TABLET ORAL at 11:31

## 2018-05-09 RX ADMIN — FAMOTIDINE SCH MG: 20 TABLET ORAL at 11:31

## 2018-05-09 RX ADMIN — FAMOTIDINE SCH MG: 20 TABLET ORAL at 17:16

## 2018-05-09 RX ADMIN — ENOXAPARIN SODIUM SCH MG: 100 INJECTION SUBCUTANEOUS at 21:38

## 2018-05-09 RX ADMIN — TRAZODONE HYDROCHLORIDE PRN MG: 50 TABLET, FILM COATED ORAL at 00:35

## 2018-05-09 RX ADMIN — SENNOSIDES A AND B SCH MG: 8.6 TABLET, FILM COATED ORAL at 20:47

## 2018-05-09 RX ADMIN — TERAZOSIN HYDROCHLORIDE SCH MG: 5 CAPSULE ORAL at 11:45

## 2018-05-09 RX ADMIN — FAMOTIDINE SCH MG: 20 TABLET ORAL at 11:30

## 2018-05-09 RX ADMIN — FINASTERIDE SCH MG: 5 TABLET, FILM COATED ORAL at 11:31

## 2018-05-09 RX ADMIN — BACLOFEN SCH MG: 10 TABLET ORAL at 20:47

## 2018-05-09 RX ADMIN — RISPERIDONE SCH MG: 0.5 TABLET ORAL at 20:48

## 2018-05-09 RX ADMIN — SENNOSIDES A AND B SCH MG: 8.6 TABLET, FILM COATED ORAL at 11:31

## 2018-05-09 NOTE — PDOC
Exam


Note:


Shai Note:


Please also refer to the separate dictated note~for this date of service 

dictated separately.~Patient seen individually. Discussed the patient with 

Nursing staff reviewed the chart.~Reviewed interim history and current 

functioning. Reviewed vital signs,~Labs/ Radiology~and current medications 

noted below. Continue current treatment with the changes noted in the dictated 

addendum note





Assessment:


Vital Signs:





 Vital Signs








  Date Time  Temp Pulse Resp B/P (MAP) Pulse Ox O2 Delivery O2 Flow Rate FiO2


 


5/9/18 18:12 98.0 93 24 93/70 (78) 100   


 


5/6/18 09:16      Room Air  








I&O











Intake and Output 


 


 5/9/18





 07:00


 


Intake Total 760 ml


 


Output Total 550 ml


 


Balance 210 ml


 


 


 


Intake Oral 760 ml


 


Output Urine Total 550 ml








Labs:





 Laboratory Tests








Test


  5/9/18


20:31


 


White Blood Count


  11.1 x10^3/uL


(4.0-11.0)  H


 


Red Blood Count


  4.49 x10^6/uL


(4.30-5.70)


 


Hemoglobin


  13.1 g/dL


(13.0-17.5)


 


Hematocrit


  40.8 %


(39.0-53.0)


 


Mean Corpuscular Volume


  91 fL ()


 


 


Mean Corpuscular Hemoglobin 29 pg (25-35)  


 


Mean Corpuscular Hemoglobin


Concent 32 g/dL


(31-37)


 


Red Cell Distribution Width


  15.8 %


(11.5-14.5)  H


 


Platelet Count


  232 x10^3/uL


(140-400)


 


Neutrophils (%) (Auto) 66 % (31-73)  


 


Lymphocytes (%) (Auto) 24 % (24-48)  


 


Monocytes (%) (Auto) 9 % (0-9)  


 


Eosinophils (%) (Auto) 1 % (0-3)  


 


Basophils (%) (Auto) 1 % (0-3)  


 


Neutrophils # (Auto)


  7.3 x10^3uL


(1.8-7.7)


 


Lymphocytes # (Auto)


  2.6 x10^3/uL


(1.0-4.8)


 


Monocytes # (Auto)


  1.0 x10^3/uL


(0.0-1.1)


 


Eosinophils # (Auto)


  0.2 x10^3/uL


(0.0-0.7)


 


Basophils # (Auto)


  0.1 x10^3/uL


(0.0-0.2)


 


Sodium Level


  144 mmol/L


(136-145)


 


Potassium Level


  3.8 mmol/L


(3.5-5.1)


 


Chloride Level


  108 mmol/L


()  H


 


Carbon Dioxide Level


  25 mmol/L


(21-32)


 


Anion Gap 11 (6-14)  


 


Blood Urea Nitrogen


  28 mg/dL


(8-26)  H


 


Creatinine


  1.9 mg/dL


(0.7-1.3)  H


 


Estimated GFR


(Cockcroft-Gault) 34.9  


 


 


BUN/Creatinine Ratio 15 (6-20)  


 


Glucose Level


  161 mg/dL


(70-99)  H


 


Calcium Level


  9.5 mg/dL


(8.5-10.1)


 


Total Bilirubin


  0.3 mg/dL


(0.2-1.0)


 


Aspartate Amino Transferase


(AST) 16 U/L (15-37)


 


 


Alanine Aminotransferase (ALT)


  30 U/L (16-63)


 


 


Alkaline Phosphatase


  92 U/L


()


 


Total Protein


  7.6 g/dL


(6.4-8.2)


 


Albumin


  2.9 g/dL


(3.4-5.0)  L


 


Albumin/Globulin Ratio


  0.6 (1.0-1.7)


L











Current Medications:


Meds:





Current Medications


Multi-Ingredient Ointment (Analgesic Balm) 1 bob PRN QID  PRN TP MUSCLE PAIN;  

Start 5/5/18 at 15:00


Acetaminophen (Tylenol) 650 mg PRN Q6HRS  PRN PO PAIN / TEMP;  Start 5/5/18 at 

15:30


Baclofen (Lioresal) 10 mg BID PO  Last administered on 5/9/18at 20:47;  Start 5/ 5/18 at 21:00


Bisacodyl (Dulcolax Supp) 10 mg PRN DAILY  PRN RC CONSTIPATION Last 

administered on 5/8/18at 14:36;  Start 5/5/18 at 15:30


Docusate Sodium (Colace) 100 mg PRN BID  PRN PO CONSTIPATION;  Start 5/5/18 at 

15:30


Famotidine (Pepcid) 20 mg BIDBFRMEAL PO  Last administered on 5/9/18at 17:16;  

Start 5/5/18 at 16:30


Finasteride (Proscar) 5 mg DAILY PO  Last administered on 5/9/18at 11:31;  

Start 5/6/18 at 09:00


Haloperidol (Haldol) 0.5 mg PRN TID  PRN PO AGITATION Last administered on 5/8/ 18at 09:21;  Start 5/5/18 at 15:30


Lorazepam (Ativan) 0.5 mg TID PRN  PRN PO ANXIETY / AGITATION Last administered 

on 5/8/18at 09:21;  Start 5/5/18 at 15:30


Ondansetron HCl (Zofran Odt) 4 mg PRN Q6HRS  PRN PO NAUSEA/VOMITING;  Start 5/5/ 18 at 15:30


Sennosides (Senna) 8.6 mg BID PO  Last administered on 5/9/18at 20:47;  Start 5/ 5/18 at 21:00


Sennosides (Senna) 8.6 mg PRN DAILY  PRN PO CONSTIPATION;  Start 5/5/18 at 15:30


Lithium Carbonate 300 mg BID PO  Last administered on 5/8/18at 11:17;  Start 5/5 /18 at 21:00;  Stop 5/8/18 at 13:52;  Status DC


Oxycodone HCl (Roxicodone) 5 mg PRN Q6HRS  PRN PO PAIN Last administered on 5/8/ 18at 12:04;  Start 5/5/18 at 15:45


Terazosin HCl (Hytrin) 10 mg DAILY PO  Last administered on 5/9/18at 11:45;  

Start 5/6/18 at 09:00


Amitriptyline HCl (Elavil) 75 mg QHS PO  Last administered on 5/6/18at 20:00;  

Start 5/5/18 at 21:00;  Stop 5/7/18 at 19:22;  Status DC


Cetirizine HCl (ZyrTEC) 10 mg DAILY PO  Last administered on 5/9/18at 11:32;  

Start 5/6/18 at 09:00


Olanzapine (ZyPREXA ZYDIS) 2.5 mg PRN Q2HR  PRN PO Psychosis;  Start 5/5/18 at 

15:45;  Stop 5/8/18 at 11:33;  Status DC


Multi-Ingredient Ointment (Analgesic Balm) 1 bob PRN QID  PRN TP MUSCLE PAIN;  

Start 5/5/18 at 15:45;  Status Cancel


Al Hydroxide/Mg Hydroxide (Mylanta Plus Xs) 15 ml PRN AFTMEALHC  PRN PO 

DYSPEPSIA;  Start 5/5/18 at 15:45


Magnesium Hydroxide (Milk Of Magnesia) 2,400 mg PRN QHS  PRN PO CONSTIPATION 

Last administered on 5/8/18at 12:05;  Start 5/5/18 at 15:45


Enoxaparin Sodium (Lovenox) 40 mg Q24H SQ  Last administered on 5/5/18at 20:19;

  Start 5/5/18 at 21:00;  Stop 5/6/18 at 14:41;  Status DC


Enoxaparin Sodium (Lovenox) 30 mg Q24H SQ  Last administered on 5/9/18at 21:38;

  Start 5/6/18 at 21:00


Risperidone (RisperDAL) 0.25 mg QHS PO  Last administered on 5/6/18at 20:00;  

Start 5/6/18 at 21:00;  Stop 5/7/18 at 19:22;  Status DC


Vitamin D (Vitamin D3) 50,000 unit WEEKLY PO  Last administered on 5/8/18at 11:

18;  Start 5/8/18 at 09:00


Amitriptyline HCl (Elavil) 50 mg QHS PO  Last administered on 5/7/18at 19:46;  

Start 5/7/18 at 21:00;  Stop 5/8/18 at 19:46;  Status DC


Risperidone (RisperDAL) 0.5 mg QHS PO  Last administered on 5/9/18at 20:48;  

Start 5/7/18 at 21:00


Olanzapine (ZyPREXA ZYDIS) 5 mg PRN Q2HR  PRN PO Psychosis Last administered on 

5/9/18at 00:42;  Start 5/8/18 at 11:45


Lithium Citrate 8 meq BID PO ;  Start 5/8/18 at 21:00;  Stop 5/8/18 at 21:00;  

Status DC


Trazodone HCl (Desyrel) 50 mg PRN QHS  PRN PO INSOMNIA Last administered on 5/9/ 18at 00:35;  Start 5/8/18 at 19:45


Mirtazapine (Remeron) 7.5 mg QHS PO  Last administered on 5/9/18at 20:48;  

Start 5/9/18 at 21:00





Active Scripts


Active


Reported


Amitriptyline Hcl 75 Mg Tablet 75 Mg PO QHS


Oxycodone Hcl 5 Mg Capsule 5 Mg PO PRN Q6HRS PRN


Lorazepam 0.5 Mg Tablet 0.5 Mg PO TID PRN PRN


Famotidine 20 Mg Tablet 20 Mg PO BIDBFRMEAL


Haloperidol 2 Mg Tablet 0.5 Mg PO PRN TID PRN


Terazosin Hcl 10 Mg Capsule 10 Mg PO DAILY


Ondansetron Odt (Ondansetron) 4 Mg Tab.rapdis 4 Mg PO PRN Q6HRS


Milk Of Magnesia (Magnesium Hydroxide) 2,400 Mg/10 Ml Oral.susp 2,400 Mg PO PRN 

QID PRN


Loratadine 10 Mg Tablet 10 Mg PO 


Lithium Carbonate 300 Mg Capsule 300 Mg PO BID


Baclofen 10 Mg Tablet 10 Mg PO BID


Maalox Maximum Strength Susp (Mag Hydrox/Al Hydrox/Simeth) 355 Ml Oral.susp 15 

Ml PO PRN Q6HRS PRN


Finasteride 5 Mg Tablet 5 Mg PO DAILY


Colace (Docusate Sodium) 100 Mg Capsule 100 Mg PO PRN BID PRN


Senokot (Sennosides) 8.6 Mg Tablet 8.6 Mg PO PRN DAILY PRN


Senokot (Sennosides) 8.6 Mg Tablet 8.6 Mg PO BID


Bisacodyl 10 Mg Supp.rect 10 Mg RC PRN DAILY PRN


Tylenol (Acetaminophen) 325 Mg Tablet 650 Mg PO PRN Q6HRS PRN


I have reviewed the current psychotropics carefully including drug 

interactions.  Risk benefit ratio favors no change other than as noted in my 

dictated progress note.





Diagnosis:


Problems:  


(1) Anxiety disorder


(2) Bipolar affective, mixed, sev w/ psych


(3) Dementia, vascular, with delusions


(4) Mild cognitive impairment











DIANA STALLINGS MD May 9, 2018 22:13

## 2018-05-10 VITALS — SYSTOLIC BLOOD PRESSURE: 120 MMHG | DIASTOLIC BLOOD PRESSURE: 73 MMHG

## 2018-05-10 VITALS — SYSTOLIC BLOOD PRESSURE: 128 MMHG | DIASTOLIC BLOOD PRESSURE: 73 MMHG

## 2018-05-10 LAB
APTT PPP: YELLOW S
BACTERIA #/AREA URNS HPF: 0 /HPF
BILIRUB UR QL STRIP: (no result)
FIBRINOGEN PPP-MCNC: CLEAR MG/DL
GLUCOSE UR STRIP-MCNC: (no result) MG/DL
HYALINE CASTS #/AREA URNS LPF: (no result) /HPF
LITHIUM SERPL-SCNC: 0.7 MMOL/L (ref 0.6–1.2)
NITRITE UR QL STRIP: (no result)
RBC #/AREA URNS HPF: (no result) /HPF (ref 0–2)
SP GR UR STRIP: 1.01
SQUAMOUS #/AREA URNS LPF: (no result) /LPF
UROBILINOGEN UR-MCNC: 0.2 MG/DL
WBC #/AREA URNS HPF: (no result) /HPF (ref 0–4)

## 2018-05-10 RX ADMIN — SENNOSIDES A AND B SCH MG: 8.6 TABLET, FILM COATED ORAL at 09:00

## 2018-05-10 RX ADMIN — BACLOFEN SCH MG: 10 TABLET ORAL at 20:34

## 2018-05-10 RX ADMIN — BACLOFEN SCH MG: 10 TABLET ORAL at 09:00

## 2018-05-10 RX ADMIN — LITHIUM CARBONATE SCH MG: 300 TABLET ORAL at 20:35

## 2018-05-10 RX ADMIN — FAMOTIDINE SCH MG: 20 TABLET ORAL at 16:44

## 2018-05-10 RX ADMIN — ENOXAPARIN SODIUM SCH MG: 100 INJECTION SUBCUTANEOUS at 20:35

## 2018-05-10 RX ADMIN — RISPERIDONE SCH MG: 0.5 TABLET ORAL at 20:34

## 2018-05-10 RX ADMIN — MIRTAZAPINE SCH MG: 7.5 TABLET, FILM COATED ORAL at 20:34

## 2018-05-10 RX ADMIN — ACETAMINOPHEN PRN MG: 325 TABLET, FILM COATED ORAL at 16:45

## 2018-05-10 RX ADMIN — FINASTERIDE SCH MG: 5 TABLET, FILM COATED ORAL at 09:00

## 2018-05-10 RX ADMIN — CETIRIZINE HYDROCHLORIDE SCH MG: 10 TABLET, FILM COATED ORAL at 09:00

## 2018-05-10 RX ADMIN — TERAZOSIN HYDROCHLORIDE SCH MG: 5 CAPSULE ORAL at 09:00

## 2018-05-10 RX ADMIN — SENNOSIDES A AND B SCH MG: 8.6 TABLET, FILM COATED ORAL at 20:34

## 2018-05-10 NOTE — PN
DATE:  05/08/2018



This is a late entry, 05/08/2018, covers the elements not covered in my initial

note, 05/08/2018.



SUBJECTIVE:  I met with the patient in the evening of 05/08/2018.  The patient

did have a CT head at [facility] and was negative of anything

acute.  He has been quite anxious, restless, having jerking movements.  Dr. Caicedo, Neurology has been consulted.  Lithium level was 1.1.  He has been

flailing his arms, he had been called by nursing staff several times during the

day, paged as an emergency due to his marked agitation, restlessness, refusal of

medications, hitting his hand and bursting his knuckles open.  He had received 3

p.r.n. of Haldol and Ativan the day before.  He slept poorly previous evening. 

We will start trazodone 50 mg p.o. at bedtime p.r.n., may repeat x 2.



REVIEW OF SYSTEMS:  Ambulation impaired, in a wheelchair.  No CV, , pulmonary,

eye, ENT system symptoms on review.  Reliability poor.



MENTAL STATUS EXAM:  Oriented to himself.  Insight, judgment, recent memory is

impaired.  Language function is intact.  Attention span short.  Mood and affect

quite labile, remains quite psychotic, talking to himself.



LABORATORY DATA:  Reviewed.



IMPRESSION:  Bipolar 1 disorder, mixed with psychotic features, delirium,

unspecified.



PLAN:  I would like to simplify the patient's psychotropics for baseline

assessment and then reinstitute gradually.  We will stop the lithium and the

amitriptyline.  Continue Zyprexa and increase it to 5 mg q.2 hours as needed

psychosis, agitation, max 20 mg in 24 hours, start the trazodone as noted above,

maintain Risperdal along with Ativan as needed, and Haldol as needed.





______________________________

MAN COBY STALLINGS MD



DR:  BRIDGETT/timbo  JOB#:  [id number] / [id number]

DD:  05/09/2018 16:18  DT:  05/10/2018 01:03

## 2018-05-10 NOTE — PDOC
Exam


Note:


Shai Note:


Please also refer to the separate dictated note~for this date of service 

dictated separately.~Patient seen individually. Discussed the patient with 

Nursing staff reviewed the chart.~Reviewed interim history and current 

functioning. Reviewed vital signs,~Labs/ Radiology~and current medications 

noted below. Continue current treatment with the changes noted in the dictated 

addendum note





Assessment:


Vital Signs:





 Vital Signs








  Date Time  Temp Pulse Resp B/P (MAP) Pulse Ox O2 Delivery O2 Flow Rate FiO2


 


5/10/18 18:11 97.4 71 18 120/73 (89) 96   


 


5/10/18 14:45      Room Air  








I&O











Intake and Output 


 


 5/10/18





 07:00


 


Intake Total 1380 ml


 


Output Total 650 ml


 


Balance 730 ml


 


 


 


Intake Oral 1380 ml


 


Output Urine Total 650 ml








Labs:





 Laboratory Tests








Test


 5/10/18


07:45 5/10/18


16:00


 


Lithium Level


 0.7 mmol/L


(0.6-1.2) 





 


Lithium Last Dose Date 05/08/18   


 


Lithium Last Dose Time 0900   


 


Urine Collection Type  Unknown  


 


Urine Color  Yellow  


 


Urine Clarity  Clear  


 


Urine pH  7.0  


 


Urine Specific Gravity  1.015  


 


Urine Protein


 


 100 mg/dl


(NEG-TRACE)


 


Urine Glucose (UA)


 


 Neg mg/dL


(NEG)


 


Urine Ketones (Stick)


 


 Neg mg/dL


(NEG)


 


Urine Blood  Small (NEG)  


 


Urine Nitrite  Neg (NEG)  


 


Urine Bilirubin  Neg (NEG)  


 


Urine Urobilinogen Dipstick


 


 0.2 mg/dL (0.2


mg/dL)


 


Urine Leukocyte Esterase  Trace (NEG)  


 


Urine RBC


 


 20-40 /HPF


(0-2)


 


Urine WBC


 


 1-4 /HPF (0-4)





 


Urine Squamous Epithelial


Cells 


 None /LPF  





 


Urine Bacteria


 


 0 /HPF (0-FEW)





 


Urine Hyaline Casts  Few /HPF  


 


Urine Mucus  Slight /LPF  











Current Medications:


Meds:





Current Medications


Multi-Ingredient Ointment (Analgesic Balm) 1 bob PRN QID  PRN TP MUSCLE PAIN;  

Start 5/5/18 at 15:00


Acetaminophen (Tylenol) 650 mg PRN Q6HRS  PRN PO PAIN / TEMP Last administered 

on 5/10/18at 16:45;  Start 5/5/18 at 15:30


Baclofen (Lioresal) 10 mg BID PO  Last administered on 5/10/18at 20:34;  Start 5 /5/18 at 21:00


Bisacodyl (Dulcolax Supp) 10 mg PRN DAILY  PRN RC CONSTIPATION Last 

administered on 5/8/18at 14:36;  Start 5/5/18 at 15:30


Docusate Sodium (Colace) 100 mg PRN BID  PRN PO CONSTIPATION;  Start 5/5/18 at 

15:30


Famotidine (Pepcid) 20 mg BIDBFRMEAL PO  Last administered on 5/10/18at 16:44;  

Start 5/5/18 at 16:30


Finasteride (Proscar) 5 mg DAILY PO  Last administered on 5/9/18at 11:31;  

Start 5/6/18 at 09:00


Haloperidol (Haldol) 0.5 mg PRN TID  PRN PO AGITATION Last administered on 5/8/ 18at 09:21;  Start 5/5/18 at 15:30


Lorazepam (Ativan) 0.5 mg TID PRN  PRN PO ANXIETY / AGITATION Last administered 

on 5/8/18at 09:21;  Start 5/5/18 at 15:30


Ondansetron HCl (Zofran Odt) 4 mg PRN Q6HRS  PRN PO NAUSEA/VOMITING;  Start 5/5/ 18 at 15:30


Sennosides (Senna) 8.6 mg BID PO  Last administered on 5/10/18at 20:34;  Start 5 /5/18 at 21:00


Sennosides (Senna) 8.6 mg PRN DAILY  PRN PO CONSTIPATION;  Start 5/5/18 at 15:30


Lithium Carbonate 300 mg BID PO  Last administered on 5/8/18at 11:17;  Start 5/5 /18 at 21:00;  Stop 5/8/18 at 13:52;  Status DC


Oxycodone HCl (Roxicodone) 5 mg PRN Q6HRS  PRN PO PAIN Last administered on 5/10

/18at 11:53;  Start 5/5/18 at 15:45


Terazosin HCl (Hytrin) 10 mg DAILY PO  Last administered on 5/9/18at 11:45;  

Start 5/6/18 at 09:00


Amitriptyline HCl (Elavil) 75 mg QHS PO  Last administered on 5/6/18at 20:00;  

Start 5/5/18 at 21:00;  Stop 5/7/18 at 19:22;  Status DC


Cetirizine HCl (ZyrTEC) 10 mg DAILY PO  Last administered on 5/9/18at 11:32;  

Start 5/6/18 at 09:00


Olanzapine (ZyPREXA ZYDIS) 2.5 mg PRN Q2HR  PRN PO Psychosis;  Start 5/5/18 at 

15:45;  Stop 5/8/18 at 11:33;  Status DC


Multi-Ingredient Ointment (Analgesic Balm) 1 bob PRN QID  PRN TP MUSCLE PAIN;  

Start 5/5/18 at 15:45;  Status Cancel


Al Hydroxide/Mg Hydroxide (Mylanta Plus Xs) 15 ml PRN AFTMEALHC  PRN PO 

DYSPEPSIA;  Start 5/5/18 at 15:45


Magnesium Hydroxide (Milk Of Magnesia) 2,400 mg PRN QHS  PRN PO CONSTIPATION 

Last administered on 5/8/18at 12:05;  Start 5/5/18 at 15:45


Enoxaparin Sodium (Lovenox) 40 mg Q24H SQ  Last administered on 5/5/18at 20:19;

  Start 5/5/18 at 21:00;  Stop 5/6/18 at 14:41;  Status DC


Enoxaparin Sodium (Lovenox) 30 mg Q24H SQ  Last administered on 5/10/18at 20:35

;  Start 5/6/18 at 21:00


Risperidone (RisperDAL) 0.25 mg QHS PO  Last administered on 5/6/18at 20:00;  

Start 5/6/18 at 21:00;  Stop 5/7/18 at 19:22;  Status DC


Vitamin D (Vitamin D3) 50,000 unit WEEKLY PO  Last administered on 5/8/18at 11:

18;  Start 5/8/18 at 09:00


Amitriptyline HCl (Elavil) 50 mg QHS PO  Last administered on 5/7/18at 19:46;  

Start 5/7/18 at 21:00;  Stop 5/8/18 at 19:46;  Status DC


Risperidone (RisperDAL) 0.5 mg QHS PO  Last administered on 5/10/18at 20:34;  

Start 5/7/18 at 21:00


Olanzapine (ZyPREXA ZYDIS) 5 mg PRN Q2HR  PRN PO Psychosis Last administered on 

5/9/18at 00:42;  Start 5/8/18 at 11:45


Lithium Citrate 8 meq BID PO ;  Start 5/8/18 at 21:00;  Stop 5/8/18 at 21:00;  

Status DC


Trazodone HCl (Desyrel) 50 mg PRN QHS  PRN PO INSOMNIA Last administered on 5/9/ 18at 00:35;  Start 5/8/18 at 19:45


Mirtazapine (Remeron) 7.5 mg QHS PO  Last administered on 5/10/18at 20:34;  

Start 5/9/18 at 21:00


Lithium Carbonate 150 mg DAILY PO ;  Start 5/11/18 at 09:00


Lithium Carbonate 300 mg HS PO  Last administered on 5/10/18at 20:35;  Start 5/

10/18 at 21:00





Active Scripts


Active


Reported


Amitriptyline Hcl 75 Mg Tablet 75 Mg PO QHS


Oxycodone Hcl 5 Mg Capsule 5 Mg PO PRN Q6HRS PRN


Lorazepam 0.5 Mg Tablet 0.5 Mg PO TID PRN PRN


Famotidine 20 Mg Tablet 20 Mg PO BIDBFRMEAL


Haloperidol 2 Mg Tablet 0.5 Mg PO PRN TID PRN


Terazosin Hcl 10 Mg Capsule 10 Mg PO DAILY


Ondansetron Odt (Ondansetron) 4 Mg Tab.rapdis 4 Mg PO PRN Q6HRS


Milk Of Magnesia (Magnesium Hydroxide) 2,400 Mg/10 Ml Oral.susp 2,400 Mg PO PRN 

QID PRN


Loratadine 10 Mg Tablet 10 Mg PO 


Lithium Carbonate 300 Mg Capsule 300 Mg PO BID


Baclofen 10 Mg Tablet 10 Mg PO BID


Maalox Maximum Strength Susp (Mag Hydrox/Al Hydrox/Simeth) 355 Ml Oral.susp 15 

Ml PO PRN Q6HRS PRN


Finasteride 5 Mg Tablet 5 Mg PO DAILY


Colace (Docusate Sodium) 100 Mg Capsule 100 Mg PO PRN BID PRN


Senokot (Sennosides) 8.6 Mg Tablet 8.6 Mg PO PRN DAILY PRN


Senokot (Sennosides) 8.6 Mg Tablet 8.6 Mg PO BID


Bisacodyl 10 Mg Supp.rect 10 Mg RC PRN DAILY PRN


Tylenol (Acetaminophen) 325 Mg Tablet 650 Mg PO PRN Q6HRS PRN


I have reviewed the current psychotropics carefully including drug 

interactions.  Risk benefit ratio favors no change other than as noted in my 

dictated progress note.





Diagnosis:


Problems:  


(1) Anxiety disorder


(2) Bipolar affective, mixed, sev w/ psych


(3) Dementia, vascular, with delusions


(4) Mild cognitive impairment











DIANA STALLINGS MD May 10, 2018 20:43

## 2018-05-11 VITALS — SYSTOLIC BLOOD PRESSURE: 142 MMHG | DIASTOLIC BLOOD PRESSURE: 76 MMHG

## 2018-05-11 VITALS — DIASTOLIC BLOOD PRESSURE: 87 MMHG | SYSTOLIC BLOOD PRESSURE: 133 MMHG

## 2018-05-11 LAB
ALBUMIN SERPL-MCNC: 3.1 G/DL (ref 3.4–5)
ALBUMIN/GLOB SERPL: 0.6 {RATIO} (ref 1–1.7)
ALP SERPL-CCNC: 95 U/L (ref 46–116)
ALT SERPL-CCNC: 27 U/L (ref 16–63)
ANION GAP SERPL CALC-SCNC: 7 MMOL/L (ref 6–14)
AST SERPL-CCNC: 15 U/L (ref 15–37)
BASOPHILS # BLD AUTO: 0.1 X10^3/UL (ref 0–0.2)
BASOPHILS NFR BLD: 1 % (ref 0–3)
BGAS PCO2: 35 MMHG (ref 35–46)
BGAS PH: 7.46 (ref 7.35–7.46)
BGAS PO2: 78 MMHG (ref 71–100)
BILIRUB SERPL-MCNC: 0.5 MG/DL (ref 0.2–1)
BUN/CREAT SERPL: 16 (ref 6–20)
CA-I SERPL ISE-MCNC: 25 MG/DL (ref 8–26)
CALCIUM SERPL-MCNC: 9.6 MG/DL (ref 8.5–10.1)
CHLORIDE SERPL-SCNC: 106 MMOL/L (ref 98–107)
CO2 SERPL-SCNC: 29 MMOL/L (ref 21–32)
CREAT SERPL-MCNC: 1.6 MG/DL (ref 0.7–1.3)
DELTA BASE BGAS: 1 MMOL/L (ref 0–3)
EOSINOPHIL NFR BLD: 0.2 X10^3/UL (ref 0–0.7)
EOSINOPHIL NFR BLD: 3 % (ref 0–3)
ERYTHROCYTE [DISTWIDTH] IN BLOOD BY AUTOMATED COUNT: 15.7 % (ref 11.5–14.5)
GFR SERPLBLD BASED ON 1.73 SQ M-ARVRAT: 42.6 ML/MIN
GLOBULIN SER-MCNC: 4.8 G/DL (ref 2.2–3.8)
GLUCOSE SERPL-MCNC: 94 MG/DL (ref 70–99)
HCT VFR BLD CALC: 43.1 % (ref 39–53)
HGB BLD-MCNC: 14.3 G/DL (ref 13–17.5)
LYMPHOCYTES # BLD: 2.7 X10^3/UL (ref 1–4.8)
LYMPHOCYTES NFR BLD AUTO: 34 % (ref 24–48)
MCH RBC QN AUTO: 30 PG (ref 25–35)
MCHC RBC AUTO-ENTMCNC: 33 G/DL (ref 31–37)
MCV RBC AUTO: 91 FL (ref 79–100)
MONO #: 0.7 X10^3/UL (ref 0–1.1)
MONOCYTES NFR BLD: 8 % (ref 0–9)
NEUT #: 4.4 X10^3UL (ref 1.8–7.7)
NEUTROPHILS NFR BLD AUTO: 54 % (ref 31–73)
O2 SAT BGAS: 96 % (ref 92–99)
O2/TOTAL GAS SETTING VFR VENT: 21 %
PLATELET # BLD AUTO: 228 X10^3/UL (ref 140–400)
POTASSIUM SERPL-SCNC: 3.9 MMOL/L (ref 3.5–5.1)
PROT SERPL-MCNC: 7.9 G/DL (ref 6.4–8.2)
RBC # BLD AUTO: 4.74 X10^6/UL (ref 4.3–5.7)
SODIUM SERPL-SCNC: 142 MMOL/L (ref 136–145)
WBC # BLD AUTO: 8 X10^3/UL (ref 4–11)

## 2018-05-11 RX ADMIN — BACLOFEN SCH MG: 10 TABLET ORAL at 10:16

## 2018-05-11 RX ADMIN — RISPERIDONE SCH MG: 0.5 TABLET ORAL at 20:23

## 2018-05-11 RX ADMIN — ACETAMINOPHEN PRN MG: 325 TABLET, FILM COATED ORAL at 10:18

## 2018-05-11 RX ADMIN — BACLOFEN SCH MG: 10 TABLET ORAL at 20:22

## 2018-05-11 RX ADMIN — HALOPERIDOL PRN MG: 2 TABLET ORAL at 12:42

## 2018-05-11 RX ADMIN — CETIRIZINE HYDROCHLORIDE SCH MG: 10 TABLET, FILM COATED ORAL at 10:17

## 2018-05-11 RX ADMIN — ENOXAPARIN SODIUM SCH MG: 100 INJECTION SUBCUTANEOUS at 20:24

## 2018-05-11 RX ADMIN — FAMOTIDINE SCH MG: 20 TABLET ORAL at 07:30

## 2018-05-11 RX ADMIN — SENNOSIDES A AND B SCH MG: 8.6 TABLET, FILM COATED ORAL at 10:16

## 2018-05-11 RX ADMIN — LITHIUM CARBONATE SCH MG: 300 TABLET ORAL at 20:23

## 2018-05-11 RX ADMIN — SENNOSIDES A AND B SCH MG: 8.6 TABLET, FILM COATED ORAL at 20:22

## 2018-05-11 RX ADMIN — MIRTAZAPINE SCH MG: 7.5 TABLET, FILM COATED ORAL at 20:23

## 2018-05-11 RX ADMIN — FAMOTIDINE SCH MG: 20 TABLET ORAL at 10:17

## 2018-05-11 RX ADMIN — TERAZOSIN HYDROCHLORIDE SCH MG: 5 CAPSULE ORAL at 10:17

## 2018-05-11 RX ADMIN — FINASTERIDE SCH MG: 5 TABLET, FILM COATED ORAL at 10:16

## 2018-05-11 RX ADMIN — LITHIUM CARBONATE SCH MG: 150 CAPSULE, GELATIN COATED ORAL at 10:18

## 2018-05-11 NOTE — PDOC
Exam


Note:


Shai Note:


Please also refer to the separate dictated note~for this date of service 

dictated separately.~Patient seen individually. Discussed the patient with 

Nursing staff reviewed the chart.~Reviewed interim history and current 

functioning. Reviewed vital signs,~Labs/ Radiology~and current medications 

noted below. Continue current treatment with the changes noted in the dictated 

addendum note





Assessment:


Vital Signs:





 Vital Signs








  Date Time  Temp Pulse Resp B/P (MAP) Pulse Ox O2 Delivery O2 Flow Rate FiO2


 


5/11/18 16:57   18  97 Room Air  


 


5/11/18 16:33 96.3 81  133/87 (102)    








I&O











Intake and Output 


 


 5/11/18





 07:00


 


Intake Total 600 ml


 


Balance 600 ml


 


 


 


Intake Oral 600 ml








Labs:





 Laboratory Tests








Test


 5/11/18


06:28 5/11/18


06:42 5/11/18


08:34


 


Glucose (Fingerstick)


 88 mg/dL


(70-99) 


 





 


White Blood Count


 


 8.0 x10^3/uL


(4.0-11.0) 





 


Red Blood Count


 


 4.74 x10^6/uL


(4.30-5.70) 





 


Hemoglobin


 


 14.3 g/dL


(13.0-17.5) 





 


Hematocrit


 


 43.1 %


(39.0-53.0) 





 


Mean Corpuscular Volume


 


 91 fL ()


 





 


Mean Corpuscular Hemoglobin  30 pg (25-35)   


 


Mean Corpuscular Hemoglobin


Concent 


 33 g/dL


(31-37) 





 


Red Cell Distribution Width


 


 15.7 %


(11.5-14.5)  H 





 


Platelet Count


 


 228 x10^3/uL


(140-400) 





 


Neutrophils (%) (Auto)  54 % (31-73)   


 


Lymphocytes (%) (Auto)  34 % (24-48)   


 


Monocytes (%) (Auto)  8 % (0-9)   


 


Eosinophils (%) (Auto)  3 % (0-3)   


 


Basophils (%) (Auto)  1 % (0-3)   


 


Neutrophils # (Auto)


 


 4.4 x10^3uL


(1.8-7.7) 





 


Lymphocytes # (Auto)


 


 2.7 x10^3/uL


(1.0-4.8) 





 


Monocytes # (Auto)


 


 0.7 x10^3/uL


(0.0-1.1) 





 


Eosinophils # (Auto)


 


 0.2 x10^3/uL


(0.0-0.7) 





 


Basophils # (Auto)


 


 0.1 x10^3/uL


(0.0-0.2) 





 


Sodium Level


 


 142 mmol/L


(136-145) 





 


Potassium Level


 


 3.9 mmol/L


(3.5-5.1) 





 


Chloride Level


 


 106 mmol/L


() 





 


Carbon Dioxide Level


 


 29 mmol/L


(21-32) 





 


Anion Gap  7 (6-14)   


 


Blood Urea Nitrogen


 


 25 mg/dL


(8-26) 





 


Creatinine


 


 1.6 mg/dL


(0.7-1.3)  H 





 


Estimated GFR


(Cockcroft-Gault) 


 42.6  


 





 


BUN/Creatinine Ratio  16 (6-20)   


 


Glucose Level


 


 94 mg/dL


(70-99) 





 


Lactic Acid Level


 


 0.8 mmol/L


(0.4-2.0) 





 


Calcium Level


 


 9.6 mg/dL


(8.5-10.1) 





 


Total Bilirubin


 


 0.5 mg/dL


(0.2-1.0) 





 


Aspartate Amino Transferase


(AST) 


 15 U/L (15-37)


 





 


Alanine Aminotransferase (ALT)


 


 27 U/L (16-63)


 





 


Alkaline Phosphatase


 


 95 U/L


() 





 


Total Protein


 


 7.9 g/dL


(6.4-8.2) 





 


Albumin


 


 3.1 g/dL


(3.4-5.0)  L 





 


Albumin/Globulin Ratio


 


 0.6 (1.0-1.7)


L 





 


Blood pH


 


 


 7.46


(7.35-7.46)


 


Blood Gas PCO2


 


 


 35 mmHg


(35-46)


 


Blood Gas PO2


 


 


 78 mmHg


()


 


Blood Gas HCO3


 


 


 25 mmol/L


(21-28)


 


Arterial Bld O2 Saturation


(Calc) 


 


 96 % (92-99)  





 


FiO2   21 %  











Current Medications:


Meds:





Current Medications


Multi-Ingredient Ointment (Analgesic Balm) 1 bob PRN QID  PRN TP MUSCLE PAIN;  

Start 5/5/18 at 15:00


Acetaminophen (Tylenol) 650 mg PRN Q6HRS  PRN PO PAIN / TEMP Last administered 

on 5/11/18at 10:18;  Start 5/5/18 at 15:30


Baclofen (Lioresal) 10 mg BID PO  Last administered on 5/11/18at 20:22;  Start 5 /5/18 at 21:00


Bisacodyl (Dulcolax Supp) 10 mg PRN DAILY  PRN RC CONSTIPATION Last 

administered on 5/8/18at 14:36;  Start 5/5/18 at 15:30


Docusate Sodium (Colace) 100 mg PRN BID  PRN PO CONSTIPATION;  Start 5/5/18 at 

15:30


Famotidine (Pepcid) 20 mg BIDBFRMEAL PO  Last administered on 5/11/18at 10:17;  

Start 5/5/18 at 16:30


Finasteride (Proscar) 5 mg DAILY PO  Last administered on 5/11/18at 10:16;  

Start 5/6/18 at 09:00


Haloperidol (Haldol) 0.5 mg PRN TID  PRN PO AGITATION Last administered on 5/11/ 18at 12:42;  Start 5/5/18 at 15:30


Lorazepam (Ativan) 0.5 mg TID PRN  PRN PO ANXIETY / AGITATION Last administered 

on 5/8/18at 09:21;  Start 5/5/18 at 15:30


Ondansetron HCl (Zofran Odt) 4 mg PRN Q6HRS  PRN PO NAUSEA/VOMITING;  Start 5/5/ 18 at 15:30


Sennosides (Senna) 8.6 mg BID PO  Last administered on 5/11/18at 20:22;  Start 5 /5/18 at 21:00


Sennosides (Senna) 8.6 mg PRN DAILY  PRN PO CONSTIPATION;  Start 5/5/18 at 15:30


Lithium Carbonate 300 mg BID PO  Last administered on 5/8/18at 11:17;  Start 5/5 /18 at 21:00;  Stop 5/8/18 at 13:52;  Status DC


Oxycodone HCl (Roxicodone) 5 mg PRN Q6HRS  PRN PO PAIN Last administered on 5/11 /18at 12:27;  Start 5/5/18 at 15:45


Terazosin HCl (Hytrin) 10 mg DAILY PO  Last administered on 5/11/18at 10:17;  

Start 5/6/18 at 09:00


Amitriptyline HCl (Elavil) 75 mg QHS PO  Last administered on 5/6/18at 20:00;  

Start 5/5/18 at 21:00;  Stop 5/7/18 at 19:22;  Status DC


Cetirizine HCl (ZyrTEC) 10 mg DAILY PO  Last administered on 5/11/18at 10:17;  

Start 5/6/18 at 09:00


Olanzapine (ZyPREXA ZYDIS) 2.5 mg PRN Q2HR  PRN PO Psychosis;  Start 5/5/18 at 

15:45;  Stop 5/8/18 at 11:33;  Status DC


Multi-Ingredient Ointment (Analgesic Balm) 1 bob PRN QID  PRN TP MUSCLE PAIN;  

Start 5/5/18 at 15:45;  Status Cancel


Al Hydroxide/Mg Hydroxide (Mylanta Plus Xs) 15 ml PRN AFTMEALHC  PRN PO 

DYSPEPSIA;  Start 5/5/18 at 15:45


Magnesium Hydroxide (Milk Of Magnesia) 2,400 mg PRN QHS  PRN PO CONSTIPATION 

Last administered on 5/8/18at 12:05;  Start 5/5/18 at 15:45


Enoxaparin Sodium (Lovenox) 40 mg Q24H SQ  Last administered on 5/5/18at 20:19;

  Start 5/5/18 at 21:00;  Stop 5/6/18 at 14:41;  Status DC


Enoxaparin Sodium (Lovenox) 30 mg Q24H SQ  Last administered on 5/11/18at 20:24

;  Start 5/6/18 at 21:00


Risperidone (RisperDAL) 0.25 mg QHS PO  Last administered on 5/6/18at 20:00;  

Start 5/6/18 at 21:00;  Stop 5/7/18 at 19:22;  Status DC


Vitamin D (Vitamin D3) 50,000 unit WEEKLY PO  Last administered on 5/8/18at 11:

18;  Start 5/8/18 at 09:00


Amitriptyline HCl (Elavil) 50 mg QHS PO  Last administered on 5/7/18at 19:46;  

Start 5/7/18 at 21:00;  Stop 5/8/18 at 19:46;  Status DC


Risperidone (RisperDAL) 0.5 mg QHS PO  Last administered on 5/11/18at 20:23;  

Start 5/7/18 at 21:00


Olanzapine (ZyPREXA ZYDIS) 5 mg PRN Q2HR  PRN PO Psychosis Last administered on 

5/9/18at 00:42;  Start 5/8/18 at 11:45


Lithium Citrate 8 meq BID PO ;  Start 5/8/18 at 21:00;  Stop 5/8/18 at 21:00;  

Status DC


Trazodone HCl (Desyrel) 50 mg PRN QHS  PRN PO INSOMNIA Last administered on 5/9/ 18at 00:35;  Start 5/8/18 at 19:45


Mirtazapine (Remeron) 7.5 mg QHS PO  Last administered on 5/11/18at 20:23;  

Start 5/9/18 at 21:00


Lithium Carbonate 150 mg DAILY PO  Last administered on 5/11/18at 10:18;  Start 

5/11/18 at 09:00


Lithium Carbonate 300 mg HS PO  Last administered on 5/11/18at 20:23;  Start 5/

10/18 at 21:00





Active Scripts


Active


Reported


Amitriptyline Hcl 75 Mg Tablet 75 Mg PO QHS


Oxycodone Hcl 5 Mg Capsule 5 Mg PO PRN Q6HRS PRN


Lorazepam 0.5 Mg Tablet 0.5 Mg PO TID PRN PRN


Famotidine 20 Mg Tablet 20 Mg PO BIDBFRMEAL


Haloperidol 2 Mg Tablet 0.5 Mg PO PRN TID PRN


Terazosin Hcl 10 Mg Capsule 10 Mg PO DAILY


Ondansetron Odt (Ondansetron) 4 Mg Tab.rapdis 4 Mg PO PRN Q6HRS


Milk Of Magnesia (Magnesium Hydroxide) 2,400 Mg/10 Ml Oral.susp 2,400 Mg PO PRN 

QID PRN


Loratadine 10 Mg Tablet 10 Mg PO 


Lithium Carbonate 300 Mg Capsule 300 Mg PO BID


Baclofen 10 Mg Tablet 10 Mg PO BID


Maalox Maximum Strength Susp (Mag Hydrox/Al Hydrox/Simeth) 355 Ml Oral.susp 15 

Ml PO PRN Q6HRS PRN


Finasteride 5 Mg Tablet 5 Mg PO DAILY


Colace (Docusate Sodium) 100 Mg Capsule 100 Mg PO PRN BID PRN


Senokot (Sennosides) 8.6 Mg Tablet 8.6 Mg PO PRN DAILY PRN


Senokot (Sennosides) 8.6 Mg Tablet 8.6 Mg PO BID


Bisacodyl 10 Mg Supp.rect 10 Mg RC PRN DAILY PRN


Tylenol (Acetaminophen) 325 Mg Tablet 650 Mg PO PRN Q6HRS PRN


I have reviewed the current psychotropics carefully including drug 

interactions.  Risk benefit ratio favors no change other than as noted in my 

dictated progress note.





Diagnosis:


Problems:  


(1) Anxiety disorder


(2) Bipolar affective, mixed, sev w/ psych


(3) Dementia, vascular, with delusions


(4) Mild cognitive impairment











DIANA STALLINGS MD May 11, 2018 20:42

## 2018-05-12 VITALS — DIASTOLIC BLOOD PRESSURE: 79 MMHG | SYSTOLIC BLOOD PRESSURE: 119 MMHG

## 2018-05-12 VITALS — SYSTOLIC BLOOD PRESSURE: 125 MMHG | DIASTOLIC BLOOD PRESSURE: 85 MMHG

## 2018-05-12 VITALS — SYSTOLIC BLOOD PRESSURE: 143 MMHG | DIASTOLIC BLOOD PRESSURE: 89 MMHG

## 2018-05-12 RX ADMIN — SENNOSIDES A AND B SCH MG: 8.6 TABLET, FILM COATED ORAL at 09:14

## 2018-05-12 RX ADMIN — CETIRIZINE HYDROCHLORIDE SCH MG: 10 TABLET, FILM COATED ORAL at 09:16

## 2018-05-12 RX ADMIN — RISPERIDONE SCH MG: 0.5 TABLET ORAL at 20:58

## 2018-05-12 RX ADMIN — FAMOTIDINE SCH MG: 20 TABLET ORAL at 09:17

## 2018-05-12 RX ADMIN — SENNOSIDES A AND B SCH MG: 8.6 TABLET, FILM COATED ORAL at 20:56

## 2018-05-12 RX ADMIN — FINASTERIDE SCH MG: 5 TABLET, FILM COATED ORAL at 09:16

## 2018-05-12 RX ADMIN — BACLOFEN SCH MG: 10 TABLET ORAL at 09:14

## 2018-05-12 RX ADMIN — LITHIUM CARBONATE SCH MG: 150 CAPSULE, GELATIN COATED ORAL at 09:16

## 2018-05-12 RX ADMIN — LITHIUM CARBONATE SCH MG: 300 TABLET ORAL at 20:55

## 2018-05-12 RX ADMIN — MIRTAZAPINE SCH MG: 7.5 TABLET, FILM COATED ORAL at 20:55

## 2018-05-12 RX ADMIN — FAMOTIDINE SCH MG: 20 TABLET ORAL at 18:16

## 2018-05-12 RX ADMIN — ENOXAPARIN SODIUM SCH MG: 100 INJECTION SUBCUTANEOUS at 21:04

## 2018-05-12 RX ADMIN — TRAZODONE HYDROCHLORIDE PRN MG: 50 TABLET, FILM COATED ORAL at 20:59

## 2018-05-12 RX ADMIN — TERAZOSIN HYDROCHLORIDE SCH MG: 5 CAPSULE ORAL at 09:16

## 2018-05-12 RX ADMIN — BACLOFEN SCH MG: 10 TABLET ORAL at 20:55

## 2018-05-12 NOTE — PDOC
Exam


Note:


Shai Note:


Please also refer to the separate dictated note~for this date of service 

dictated separately.~Patient seen individually. Discussed the patient with 

Nursing staff reviewed the chart.~Reviewed interim history and current 

functioning. Reviewed vital signs,~Labs/ Radiology~and current medications 

noted below. Continue current treatment with the changes noted in the dictated 

addendum note





Assessment:


Vital Signs:





 Vital Signs








  Date Time  Temp Pulse Resp B/P (MAP) Pulse Ox O2 Delivery O2 Flow Rate FiO2


 


5/12/18 16:03 97.3 79 18 125/85 (98) 100   


 


5/12/18 14:10      Room Air  








I&O











Intake and Output 


 


 5/12/18





 07:00


 


Intake Total 240 ml


 


Output Total 520 ml


 


Balance -280 ml


 


 


 


Intake Oral 240 ml


 


Output Urine Total 520 ml











Current Medications:


Meds:





Current Medications


Multi-Ingredient Ointment (Analgesic Balm) 1 bob PRN QID  PRN TP MUSCLE PAIN;  

Start 5/5/18 at 15:00


Acetaminophen (Tylenol) 650 mg PRN Q6HRS  PRN PO PAIN / TEMP Last administered 

on 5/11/18at 10:18;  Start 5/5/18 at 15:30


Baclofen (Lioresal) 10 mg BID PO  Last administered on 5/12/18at 20:55;  Start 5 /5/18 at 21:00


Bisacodyl (Dulcolax Supp) 10 mg PRN DAILY  PRN RC CONSTIPATION Last 

administered on 5/8/18at 14:36;  Start 5/5/18 at 15:30


Docusate Sodium (Colace) 100 mg PRN BID  PRN PO CONSTIPATION;  Start 5/5/18 at 

15:30


Famotidine (Pepcid) 20 mg BIDBFRMEAL PO  Last administered on 5/12/18at 18:16;  

Start 5/5/18 at 16:30


Finasteride (Proscar) 5 mg DAILY PO  Last administered on 5/12/18at 09:16;  

Start 5/6/18 at 09:00


Haloperidol (Haldol) 0.5 mg PRN TID  PRN PO AGITATION Last administered on 5/11/ 18at 12:42;  Start 5/5/18 at 15:30


Lorazepam (Ativan) 0.5 mg TID PRN  PRN PO ANXIETY / AGITATION Last administered 

on 5/12/18at 09:53;  Start 5/5/18 at 15:30


Ondansetron HCl (Zofran Odt) 4 mg PRN Q6HRS  PRN PO NAUSEA/VOMITING;  Start 5/5/ 18 at 15:30


Sennosides (Senna) 8.6 mg BID PO  Last administered on 5/12/18at 20:56;  Start 5 /5/18 at 21:00


Sennosides (Senna) 8.6 mg PRN DAILY  PRN PO CONSTIPATION;  Start 5/5/18 at 15:30


Lithium Carbonate 300 mg BID PO  Last administered on 5/8/18at 11:17;  Start 5/5 /18 at 21:00;  Stop 5/8/18 at 13:52;  Status DC


Oxycodone HCl (Roxicodone) 5 mg PRN Q6HRS  PRN PO PAIN Last administered on 5/12 /18at 20:59;  Start 5/5/18 at 15:45


Terazosin HCl (Hytrin) 10 mg DAILY PO  Last administered on 5/12/18at 09:16;  

Start 5/6/18 at 09:00


Amitriptyline HCl (Elavil) 75 mg QHS PO  Last administered on 5/6/18at 20:00;  

Start 5/5/18 at 21:00;  Stop 5/7/18 at 19:22;  Status DC


Cetirizine HCl (ZyrTEC) 10 mg DAILY PO  Last administered on 5/12/18at 09:16;  

Start 5/6/18 at 09:00


Olanzapine (ZyPREXA ZYDIS) 2.5 mg PRN Q2HR  PRN PO Psychosis;  Start 5/5/18 at 

15:45;  Stop 5/8/18 at 11:33;  Status DC


Multi-Ingredient Ointment (Analgesic Balm) 1 bob PRN QID  PRN TP MUSCLE PAIN;  

Start 5/5/18 at 15:45;  Status Cancel


Al Hydroxide/Mg Hydroxide (Mylanta Plus Xs) 15 ml PRN AFTMEALHC  PRN PO 

DYSPEPSIA;  Start 5/5/18 at 15:45


Magnesium Hydroxide (Milk Of Magnesia) 2,400 mg PRN QHS  PRN PO CONSTIPATION 

Last administered on 5/8/18at 12:05;  Start 5/5/18 at 15:45


Enoxaparin Sodium (Lovenox) 40 mg Q24H SQ  Last administered on 5/5/18at 20:19;

  Start 5/5/18 at 21:00;  Stop 5/6/18 at 14:41;  Status DC


Enoxaparin Sodium (Lovenox) 30 mg Q24H SQ  Last administered on 5/12/18at 21:04

;  Start 5/6/18 at 21:00


Risperidone (RisperDAL) 0.25 mg QHS PO  Last administered on 5/6/18at 20:00;  

Start 5/6/18 at 21:00;  Stop 5/7/18 at 19:22;  Status DC


Vitamin D (Vitamin D3) 50,000 unit WEEKLY PO  Last administered on 5/8/18at 11:

18;  Start 5/8/18 at 09:00


Amitriptyline HCl (Elavil) 50 mg QHS PO  Last administered on 5/7/18at 19:46;  

Start 5/7/18 at 21:00;  Stop 5/8/18 at 19:46;  Status DC


Risperidone (RisperDAL) 0.5 mg QHS PO  Last administered on 5/11/18at 20:23;  

Start 5/7/18 at 21:00;  Stop 5/12/18 at 20:19;  Status DC


Olanzapine (ZyPREXA ZYDIS) 5 mg PRN Q2HR  PRN PO Psychosis Last administered on 

5/9/18at 00:42;  Start 5/8/18 at 11:45


Lithium Citrate 8 meq BID PO ;  Start 5/8/18 at 21:00;  Stop 5/8/18 at 21:00;  

Status DC


Trazodone HCl (Desyrel) 50 mg PRN QHS  PRN PO INSOMNIA Last administered on 5/12 /18at 20:59;  Start 5/8/18 at 19:45


Mirtazapine (Remeron) 7.5 mg QHS PO  Last administered on 5/12/18at 20:55;  

Start 5/9/18 at 21:00


Lithium Carbonate 150 mg DAILY PO  Last administered on 5/12/18at 09:16;  Start 

5/11/18 at 09:00


Lithium Carbonate 300 mg HS PO  Last administered on 5/12/18at 20:55;  Start 5/

10/18 at 21:00


Nystatin (Nystop) 15 bob STK-MED ONCE TP  Last administered on 5/11/18at 22:17;

  Start 5/11/18 at 22:17;  Stop 5/11/18 at 22:18;  Status DC


Risperidone (RisperDAL) 0.75 mg QHS PO  Last administered on 5/12/18at 20:58;  

Start 5/12/18 at 21:00





Active Scripts


Active


Reported


Amitriptyline Hcl 75 Mg Tablet 75 Mg PO QHS


Oxycodone Hcl 5 Mg Capsule 5 Mg PO PRN Q6HRS PRN


Lorazepam 0.5 Mg Tablet 0.5 Mg PO TID PRN PRN


Famotidine 20 Mg Tablet 20 Mg PO BIDBFRMEAL


Haloperidol 2 Mg Tablet 0.5 Mg PO PRN TID PRN


Terazosin Hcl 10 Mg Capsule 10 Mg PO DAILY


Ondansetron Odt (Ondansetron) 4 Mg Tab.rapdis 4 Mg PO PRN Q6HRS


Milk Of Magnesia (Magnesium Hydroxide) 2,400 Mg/10 Ml Oral.susp 2,400 Mg PO PRN 

QID PRN


Loratadine 10 Mg Tablet 10 Mg PO 


Lithium Carbonate 300 Mg Capsule 300 Mg PO BID


Baclofen 10 Mg Tablet 10 Mg PO BID


Maalox Maximum Strength Susp (Mag Hydrox/Al Hydrox/Simeth) 355 Ml Oral.susp 15 

Ml PO PRN Q6HRS PRN


Finasteride 5 Mg Tablet 5 Mg PO DAILY


Colace (Docusate Sodium) 100 Mg Capsule 100 Mg PO PRN BID PRN


Senokot (Sennosides) 8.6 Mg Tablet 8.6 Mg PO PRN DAILY PRN


Senokot (Sennosides) 8.6 Mg Tablet 8.6 Mg PO BID


Bisacodyl 10 Mg Supp.rect 10 Mg RC PRN DAILY PRN


Tylenol (Acetaminophen) 325 Mg Tablet 650 Mg PO PRN Q6HRS PRN


I have reviewed the current psychotropics carefully including drug 

interactions.  Risk benefit ratio favors no change other than as noted in my 

dictated progress note.





Diagnosis:


Problems:  


(1) Anxiety disorder


(2) Bipolar affective, mixed, sev w/ psych


(3) Dementia, vascular, with delusions


(4) Mild cognitive impairment











DIANA STALLINGS MD May 12, 2018 22:46

## 2018-05-12 NOTE — PN
DATE:  05/10/2018



PSYCHIATRIC PROGRESS NOTE





This late entry 05/10/2018 covers elements not covered in my initial note of

05/10/2018.





SUBJECTIVE:  Met with the patient in the evening, staffed at treatment team

meeting with the entire team and the patient's wife, Remington, attended together

with his brother, Alli.  Reviewed his history at length.  He has been treated

with ECT on two separate occasions apparently in the past with good results.  He

has been tried on various and all psychotropics, according to the family

including Depakote and none of these were effective.  He has done best on

lithium over time and he has been on that for about 40 years.  He has been an

inpatient at [facility] about 30 years ago.  He slept 3-1/4 hours,

appetite is 50%, manipulative, repeatedly wanting Coke, unable to attend groups,

quite anxious.  Fluids are being pushed, ambulation impaired, and in wheelchair.





REVIEW OF SYSTEMS:  No CV, , pulmonary, eye, ENT system symptoms on review. 

Reliability varies.





MENTAL STATUS EXAM:  Oriented to himself, situation.  Speech is difficult to

understand at times, low in volume.  Abstraction fair, computation is impaired,

language function intact.  Mood and affect remains labile.  He does have some

dehydration.  Fluids are being pushed.





LABORATORY DATA:  Reviewed.





IMPRESSION:  Unchanged from initial note.  Bipolar 1 disorder, mixed with

psychotic features.





PLAN:  Restart lithium 150 in the morning and 300 at night.  Check CBC, CMP,

lithium level in 3 days.  Continue Rest unchanged for now.  Consider ECT if all

else fails.





______________________________

MAN COBY STALLINGS MD



DR:  BRIDGETT/timbo  JOB#:  [id number] / [id number]

DD:  05/11/2018 16:44  DT:  05/12/2018 11:55

## 2018-05-12 NOTE — PN
DATE:  05/09/2018



This late entry, 05/09/2018, covers elements not covered in my initial note of

05/09/2018.



SUBJECTIVE:  I met with the patient in the evening.  The patient slept just 1

hour previous evening, quite anxious, restless.  Appetite is poor.  We will

check a CBC, CMP evening of 05/09/2018 and defer to Dr. Hodges if dehydrated.  He

has had some night terrors screaming in his bed, hallucinating, repeatedly

wanting coke, obsessed with this, drank one-third of his chocolate Boost.



REVIEW OF SYSTEMS:  Ambulation impaired, in wheelchair.  No CV, , pulmonary,

eye, ENT system symptoms on review.  Reliability poor.



MENTAL STATUS EXAM:  Oriented to himself and situation at times.  Speech

coherent, difficult to understand.  Abstraction fair, computation impaired,

language function intact.  Remains intermittently psychotic.  No suicidal or

homicidal ideation.



IMPRESSION:  Unchanged from initial note.



PLAN:  Start Remeron 7.5 mg p.o. at bedtime.  We will discuss with family

morning of 05/10/2018 about past response to psychotropics and decide to

reinstitute lithium or try Depakote.





______________________________

DIANA STALLINGS MD



DR:  BRIDGETT/timbo  JOB#:  [id number] / [id number]

DD:  05/11/2018 16:42  DT:  05/12/2018 11:39

## 2018-05-13 VITALS — SYSTOLIC BLOOD PRESSURE: 118 MMHG | DIASTOLIC BLOOD PRESSURE: 72 MMHG

## 2018-05-13 VITALS — SYSTOLIC BLOOD PRESSURE: 120 MMHG | DIASTOLIC BLOOD PRESSURE: 79 MMHG

## 2018-05-13 LAB
ALBUMIN SERPL-MCNC: 2.7 G/DL (ref 3.4–5)
ALBUMIN/GLOB SERPL: 0.6 {RATIO} (ref 1–1.7)
ALP SERPL-CCNC: 90 U/L (ref 46–116)
ALT SERPL-CCNC: 22 U/L (ref 16–63)
ANION GAP SERPL CALC-SCNC: 8 MMOL/L (ref 6–14)
AST SERPL-CCNC: 23 U/L (ref 15–37)
BASOPHILS # BLD AUTO: 0.1 X10^3/UL (ref 0–0.2)
BASOPHILS NFR BLD: 1 % (ref 0–3)
BILIRUB SERPL-MCNC: 0.4 MG/DL (ref 0.2–1)
BUN/CREAT SERPL: 17 (ref 6–20)
CA-I SERPL ISE-MCNC: 27 MG/DL (ref 8–26)
CALCIUM SERPL-MCNC: 9.4 MG/DL (ref 8.5–10.1)
CHLORIDE SERPL-SCNC: 105 MMOL/L (ref 98–107)
CO2 SERPL-SCNC: 26 MMOL/L (ref 21–32)
CREAT SERPL-MCNC: 1.6 MG/DL (ref 0.7–1.3)
EOSINOPHIL NFR BLD: 0.2 X10^3/UL (ref 0–0.7)
EOSINOPHIL NFR BLD: 2 % (ref 0–3)
ERYTHROCYTE [DISTWIDTH] IN BLOOD BY AUTOMATED COUNT: 15.7 % (ref 11.5–14.5)
GFR SERPLBLD BASED ON 1.73 SQ M-ARVRAT: 42.6 ML/MIN
GLOBULIN SER-MCNC: 4.5 G/DL (ref 2.2–3.8)
GLUCOSE SERPL-MCNC: 96 MG/DL (ref 70–99)
HCT VFR BLD CALC: 41.4 % (ref 39–53)
HGB BLD-MCNC: 13.6 G/DL (ref 13–17.5)
LITHIUM SERPL-SCNC: 0.7 MMOL/L (ref 0.6–1.2)
LYMPHOCYTES # BLD: 3.3 X10^3/UL (ref 1–4.8)
LYMPHOCYTES NFR BLD AUTO: 31 % (ref 24–48)
MCH RBC QN AUTO: 30 PG (ref 25–35)
MCHC RBC AUTO-ENTMCNC: 33 G/DL (ref 31–37)
MCV RBC AUTO: 90 FL (ref 79–100)
MONO #: 0.9 X10^3/UL (ref 0–1.1)
MONOCYTES NFR BLD: 9 % (ref 0–9)
NEUT #: 6.2 X10^3UL (ref 1.8–7.7)
NEUTROPHILS NFR BLD AUTO: 58 % (ref 31–73)
PLATELET # BLD AUTO: 250 X10^3/UL (ref 140–400)
POTASSIUM SERPL-SCNC: 4.3 MMOL/L (ref 3.5–5.1)
PROT SERPL-MCNC: 7.2 G/DL (ref 6.4–8.2)
RBC # BLD AUTO: 4.59 X10^6/UL (ref 4.3–5.7)
SODIUM SERPL-SCNC: 139 MMOL/L (ref 136–145)
WBC # BLD AUTO: 10.7 X10^3/UL (ref 4–11)

## 2018-05-13 RX ADMIN — RISPERIDONE SCH MG: 0.5 TABLET ORAL at 21:09

## 2018-05-13 RX ADMIN — MIRTAZAPINE SCH MG: 7.5 TABLET, FILM COATED ORAL at 21:09

## 2018-05-13 RX ADMIN — FAMOTIDINE SCH MG: 20 TABLET ORAL at 17:16

## 2018-05-13 RX ADMIN — LITHIUM CARBONATE SCH MG: 300 TABLET ORAL at 21:09

## 2018-05-13 RX ADMIN — TERAZOSIN HYDROCHLORIDE SCH MG: 5 CAPSULE ORAL at 09:58

## 2018-05-13 RX ADMIN — SENNOSIDES A AND B SCH MG: 8.6 TABLET, FILM COATED ORAL at 09:58

## 2018-05-13 RX ADMIN — SENNOSIDES A AND B SCH MG: 8.6 TABLET, FILM COATED ORAL at 21:09

## 2018-05-13 RX ADMIN — FAMOTIDINE SCH MG: 20 TABLET ORAL at 09:57

## 2018-05-13 RX ADMIN — CETIRIZINE HYDROCHLORIDE SCH MG: 10 TABLET, FILM COATED ORAL at 09:58

## 2018-05-13 RX ADMIN — FINASTERIDE SCH MG: 5 TABLET, FILM COATED ORAL at 09:58

## 2018-05-13 RX ADMIN — BISACODYL PRN MG: 10 SUPPOSITORY RECTAL at 05:47

## 2018-05-13 RX ADMIN — BACLOFEN SCH MG: 10 TABLET ORAL at 21:09

## 2018-05-13 RX ADMIN — TRAZODONE HYDROCHLORIDE PRN MG: 50 TABLET, FILM COATED ORAL at 21:14

## 2018-05-13 RX ADMIN — LITHIUM CARBONATE SCH MG: 150 CAPSULE, GELATIN COATED ORAL at 09:57

## 2018-05-13 RX ADMIN — BACLOFEN SCH MG: 10 TABLET ORAL at 09:58

## 2018-05-13 RX ADMIN — ENOXAPARIN SODIUM SCH MG: 100 INJECTION SUBCUTANEOUS at 21:15

## 2018-05-13 NOTE — PDOC
Exam


Note:


Shai Note:


Please also refer to the separate dictated note~for this date of service 

dictated separately.~Patient seen individually. Discussed the patient with 

Nursing staff reviewed the chart.~Reviewed interim history and current 

functioning. Reviewed vital signs,~Labs/ Radiology~and current medications 

noted below. Continue current treatment with the changes noted in the dictated 

addendum note





Assessment:


Vital Signs:





 Vital Signs








  Date Time  Temp Pulse Resp B/P (MAP) Pulse Ox O2 Delivery O2 Flow Rate FiO2


 


5/13/18 16:41 99.0 94 20 120/79 (93) 97   


 


5/12/18 14:10      Room Air  








I&O











Intake and Output 


 


 5/13/18





 07:00


 


Intake Total 2080 ml


 


Output Total 1200 ml


 


Balance 880 ml


 


 


 


Intake Oral 2080 ml


 


Output Urine Total 1200 ml








Labs:





 Laboratory Tests








Test


 5/13/18


08:05


 


White Blood Count


 10.7 x10^3/uL


(4.0-11.0)


 


Red Blood Count


 4.59 x10^6/uL


(4.30-5.70)


 


Hemoglobin


 13.6 g/dL


(13.0-17.5)


 


Hematocrit


 41.4 %


(39.0-53.0)


 


Mean Corpuscular Volume


 90 fL ()





 


Mean Corpuscular Hemoglobin 30 pg (25-35)  


 


Mean Corpuscular Hemoglobin


Concent 33 g/dL


(31-37)


 


Red Cell Distribution Width


 15.7 %


(11.5-14.5)  H


 


Platelet Count


 250 x10^3/uL


(140-400)


 


Neutrophils (%) (Auto) 58 % (31-73)  


 


Lymphocytes (%) (Auto) 31 % (24-48)  


 


Monocytes (%) (Auto) 9 % (0-9)  


 


Eosinophils (%) (Auto) 2 % (0-3)  


 


Basophils (%) (Auto) 1 % (0-3)  


 


Neutrophils # (Auto)


 6.2 x10^3uL


(1.8-7.7)


 


Lymphocytes # (Auto)


 3.3 x10^3/uL


(1.0-4.8)


 


Monocytes # (Auto)


 0.9 x10^3/uL


(0.0-1.1)


 


Eosinophils # (Auto)


 0.2 x10^3/uL


(0.0-0.7)


 


Basophils # (Auto)


 0.1 x10^3/uL


(0.0-0.2)


 


Sodium Level


 139 mmol/L


(136-145)


 


Potassium Level


 4.3 mmol/L


(3.5-5.1)


 


Chloride Level


 105 mmol/L


()


 


Carbon Dioxide Level


 26 mmol/L


(21-32)


 


Anion Gap 8 (6-14)  


 


Blood Urea Nitrogen


 27 mg/dL


(8-26)  H


 


Creatinine


 1.6 mg/dL


(0.7-1.3)  H


 


Estimated GFR


(Cockcroft-Gault) 42.6  





 


BUN/Creatinine Ratio 17 (6-20)  


 


Glucose Level


 96 mg/dL


(70-99)


 


Calcium Level


 9.4 mg/dL


(8.5-10.1)


 


Total Bilirubin


 0.4 mg/dL


(0.2-1.0)


 


Aspartate Amino Transferase


(AST) 23 U/L (15-37)





 


Alanine Aminotransferase (ALT)


 22 U/L (16-63)





 


Alkaline Phosphatase


 90 U/L


()


 


Total Protein


 7.2 g/dL


(6.4-8.2)


 


Albumin


 2.7 g/dL


(3.4-5.0)  L


 


Albumin/Globulin Ratio


 0.6 (1.0-1.7)


L


 


Lithium Level


 0.7 mmol/L


(0.6-1.2)


 


Lithium Last Dose Date 05/12/18  


 


Lithium Last Dose Time 2100  











Current Medications:


Meds:





Current Medications


Multi-Ingredient Ointment (Analgesic Balm) 1 bob PRN QID  PRN TP MUSCLE PAIN;  

Start 5/5/18 at 15:00


Acetaminophen (Tylenol) 650 mg PRN Q6HRS  PRN PO PAIN / TEMP Last administered 

on 5/11/18at 10:18;  Start 5/5/18 at 15:30


Baclofen (Lioresal) 10 mg BID PO  Last administered on 5/13/18at 09:58;  Start 5 /5/18 at 21:00


Bisacodyl (Dulcolax Supp) 10 mg PRN DAILY  PRN RC CONSTIPATION Last 

administered on 5/13/18at 05:47;  Start 5/5/18 at 15:30


Docusate Sodium (Colace) 100 mg PRN BID  PRN PO CONSTIPATION;  Start 5/5/18 at 

15:30


Famotidine (Pepcid) 20 mg BIDBFRMEAL PO  Last administered on 5/13/18at 17:16;  

Start 5/5/18 at 16:30


Finasteride (Proscar) 5 mg DAILY PO  Last administered on 5/13/18at 09:58;  

Start 5/6/18 at 09:00


Haloperidol (Haldol) 0.5 mg PRN TID  PRN PO AGITATION Last administered on 5/11/ 18at 12:42;  Start 5/5/18 at 15:30


Lorazepam (Ativan) 0.5 mg TID PRN  PRN PO ANXIETY / AGITATION Last administered 

on 5/12/18at 09:53;  Start 5/5/18 at 15:30


Ondansetron HCl (Zofran Odt) 4 mg PRN Q6HRS  PRN PO NAUSEA/VOMITING;  Start 5/5/ 18 at 15:30


Sennosides (Senna) 8.6 mg BID PO  Last administered on 5/13/18at 09:58;  Start 5 /5/18 at 21:00


Sennosides (Senna) 8.6 mg PRN DAILY  PRN PO CONSTIPATION;  Start 5/5/18 at 15:30


Lithium Carbonate 300 mg BID PO  Last administered on 5/8/18at 11:17;  Start 5/5 /18 at 21:00;  Stop 5/8/18 at 13:52;  Status DC


Oxycodone HCl (Roxicodone) 5 mg PRN Q6HRS  PRN PO PAIN Last administered on 5/12 /18at 20:59;  Start 5/5/18 at 15:45


Terazosin HCl (Hytrin) 10 mg DAILY PO  Last administered on 5/13/18at 09:58;  

Start 5/6/18 at 09:00


Amitriptyline HCl (Elavil) 75 mg QHS PO  Last administered on 5/6/18at 20:00;  

Start 5/5/18 at 21:00;  Stop 5/7/18 at 19:22;  Status DC


Cetirizine HCl (ZyrTEC) 10 mg DAILY PO  Last administered on 5/13/18at 09:58;  

Start 5/6/18 at 09:00


Olanzapine (ZyPREXA ZYDIS) 2.5 mg PRN Q2HR  PRN PO Psychosis;  Start 5/5/18 at 

15:45;  Stop 5/8/18 at 11:33;  Status DC


Multi-Ingredient Ointment (Analgesic Balm) 1 bob PRN QID  PRN TP MUSCLE PAIN;  

Start 5/5/18 at 15:45;  Status Cancel


Al Hydroxide/Mg Hydroxide (Mylanta Plus Xs) 15 ml PRN AFTMEALHC  PRN PO 

DYSPEPSIA;  Start 5/5/18 at 15:45


Magnesium Hydroxide (Milk Of Magnesia) 2,400 mg PRN QHS  PRN PO CONSTIPATION 

Last administered on 5/8/18at 12:05;  Start 5/5/18 at 15:45


Enoxaparin Sodium (Lovenox) 40 mg Q24H SQ  Last administered on 5/5/18at 20:19;

  Start 5/5/18 at 21:00;  Stop 5/6/18 at 14:41;  Status DC


Enoxaparin Sodium (Lovenox) 30 mg Q24H SQ  Last administered on 5/12/18at 21:04

;  Start 5/6/18 at 21:00


Risperidone (RisperDAL) 0.25 mg QHS PO  Last administered on 5/6/18at 20:00;  

Start 5/6/18 at 21:00;  Stop 5/7/18 at 19:22;  Status DC


Vitamin D (Vitamin D3) 50,000 unit WEEKLY PO  Last administered on 5/8/18at 11:

18;  Start 5/8/18 at 09:00


Amitriptyline HCl (Elavil) 50 mg QHS PO  Last administered on 5/7/18at 19:46;  

Start 5/7/18 at 21:00;  Stop 5/8/18 at 19:46;  Status DC


Risperidone (RisperDAL) 0.5 mg QHS PO  Last administered on 5/11/18at 20:23;  

Start 5/7/18 at 21:00;  Stop 5/12/18 at 20:19;  Status DC


Olanzapine (ZyPREXA ZYDIS) 5 mg PRN Q2HR  PRN PO Psychosis Last administered on 

5/9/18at 00:42;  Start 5/8/18 at 11:45


Lithium Citrate 8 meq BID PO ;  Start 5/8/18 at 21:00;  Stop 5/8/18 at 21:00;  

Status DC


Trazodone HCl (Desyrel) 50 mg PRN QHS  PRN PO INSOMNIA Last administered on 5/12 /18at 20:59;  Start 5/8/18 at 19:45


Mirtazapine (Remeron) 7.5 mg QHS PO  Last administered on 5/12/18at 20:55;  

Start 5/9/18 at 21:00


Lithium Carbonate 150 mg DAILY PO  Last administered on 5/13/18at 09:57;  Start 

5/11/18 at 09:00


Lithium Carbonate 300 mg HS PO  Last administered on 5/12/18at 20:55;  Start 5/

10/18 at 21:00


Nystatin (Nystop) 15 bob STK-MED ONCE TP  Last administered on 5/11/18at 22:17;

  Start 5/11/18 at 22:17;  Stop 5/11/18 at 22:18;  Status DC


Risperidone (RisperDAL) 0.75 mg QHS PO  Last administered on 5/12/18at 20:58;  

Start 5/12/18 at 21:00





Active Scripts


Active


Reported


Amitriptyline Hcl 75 Mg Tablet 75 Mg PO QHS


Oxycodone Hcl 5 Mg Capsule 5 Mg PO PRN Q6HRS PRN


Lorazepam 0.5 Mg Tablet 0.5 Mg PO TID PRN PRN


Famotidine 20 Mg Tablet 20 Mg PO BIDBFRMEAL


Haloperidol 2 Mg Tablet 0.5 Mg PO PRN TID PRN


Terazosin Hcl 10 Mg Capsule 10 Mg PO DAILY


Ondansetron Odt (Ondansetron) 4 Mg Tab.rapdis 4 Mg PO PRN Q6HRS


Milk Of Magnesia (Magnesium Hydroxide) 2,400 Mg/10 Ml Oral.susp 2,400 Mg PO PRN 

QID PRN


Loratadine 10 Mg Tablet 10 Mg PO 


Lithium Carbonate 300 Mg Capsule 300 Mg PO BID


Baclofen 10 Mg Tablet 10 Mg PO BID


Maalox Maximum Strength Susp (Mag Hydrox/Al Hydrox/Simeth) 355 Ml Oral.susp 15 

Ml PO PRN Q6HRS PRN


Finasteride 5 Mg Tablet 5 Mg PO DAILY


Colace (Docusate Sodium) 100 Mg Capsule 100 Mg PO PRN BID PRN


Senokot (Sennosides) 8.6 Mg Tablet 8.6 Mg PO PRN DAILY PRN


Senokot (Sennosides) 8.6 Mg Tablet 8.6 Mg PO BID


Bisacodyl 10 Mg Supp.rect 10 Mg RC PRN DAILY PRN


Tylenol (Acetaminophen) 325 Mg Tablet 650 Mg PO PRN Q6HRS PRN


I have reviewed the current psychotropics carefully including drug 

interactions.  Risk benefit ratio favors no change other than as noted in my 

dictated progress note.





Diagnosis:


Problems:  


(1) Anxiety disorder


(2) Bipolar affective, mixed, sev w/ psych


(3) Dementia, vascular, with delusions


(4) Mild cognitive impairment











DIANA STALLINGS MD May 13, 2018 20:44

## 2018-05-13 NOTE — PN
DATE:  05/11/2018



This is a late entry of 05/11/2018 covers elements not covered in my initial

note of 05/11/2018.



SUBJECTIVE:  I met with the patient in the evening.  The patient slept 5-3/4

hours.  IN the morning of 05/11/2018, he was somewhat unresponsive.  Dr. Hodges

ordered labs.  WBC is improved as is the BUN and creatinine.  Albumin is

improved as well.  He is complaining of pain, was agitated, psychotic, received

p.r.n. Haldol at 12:40.  Oral intake is poor.  Fluids are being pushed.



REVIEW OF SYSTEMS:  Ambulation impaired, in Broda chair.  No CV, , pulmonary,

eye system symptoms on review.  Reliability poor.



MENTAL STATUS EXAM:  Oriented to himself and situation.  Speech is coherent, has

some latency, often responses monosyllabic.  Abstraction fair, computation

impaired, language function intact.  Mood and affect somewhat withdrawn.



LABORATORY DATA:  Reviewed.



IMPRESSION:  Bipolar 1 disorder, mixed with psychotic features.



PLAN:  Continue psychotropics mentioned in my initial note.  Repeat lithium

level is at 0.7.  Continue lithium 150 mg in a.m. and 300 in p.m.  Rest

unchanged.





______________________________

MAN COBY STALLINGS MD DR:  BRIDGETT/timbo  JOB#:  [id number] / [id number]

DD:  05/13/2018 19:17  DT:  05/13/2018 22:28

## 2018-05-14 VITALS — DIASTOLIC BLOOD PRESSURE: 81 MMHG | SYSTOLIC BLOOD PRESSURE: 128 MMHG

## 2018-05-14 VITALS — DIASTOLIC BLOOD PRESSURE: 83 MMHG | SYSTOLIC BLOOD PRESSURE: 143 MMHG

## 2018-05-14 RX ADMIN — FINASTERIDE SCH MG: 5 TABLET, FILM COATED ORAL at 08:31

## 2018-05-14 RX ADMIN — CETIRIZINE HYDROCHLORIDE SCH MG: 10 TABLET, FILM COATED ORAL at 08:32

## 2018-05-14 RX ADMIN — MIRTAZAPINE SCH MG: 7.5 TABLET, FILM COATED ORAL at 19:28

## 2018-05-14 RX ADMIN — RISPERIDONE SCH MG: 0.5 TABLET ORAL at 19:28

## 2018-05-14 RX ADMIN — BACLOFEN SCH MG: 10 TABLET ORAL at 08:32

## 2018-05-14 RX ADMIN — ENOXAPARIN SODIUM SCH MG: 100 INJECTION SUBCUTANEOUS at 19:27

## 2018-05-14 RX ADMIN — BACLOFEN SCH MG: 10 TABLET ORAL at 19:28

## 2018-05-14 RX ADMIN — POLYETHYLENE GLYCOL 3350 SCH GM: 17 POWDER, FOR SOLUTION ORAL at 08:34

## 2018-05-14 RX ADMIN — LITHIUM CARBONATE SCH MG: 150 CAPSULE, GELATIN COATED ORAL at 08:31

## 2018-05-14 RX ADMIN — POLYETHYLENE GLYCOL 3350 SCH GM: 17 POWDER, FOR SOLUTION ORAL at 19:27

## 2018-05-14 RX ADMIN — TERAZOSIN HYDROCHLORIDE SCH MG: 5 CAPSULE ORAL at 08:31

## 2018-05-14 RX ADMIN — LITHIUM CARBONATE SCH MG: 300 TABLET ORAL at 19:28

## 2018-05-14 RX ADMIN — SENNOSIDES A AND B SCH MG: 8.6 TABLET, FILM COATED ORAL at 19:27

## 2018-05-14 RX ADMIN — FAMOTIDINE SCH MG: 20 TABLET ORAL at 16:58

## 2018-05-14 RX ADMIN — FAMOTIDINE SCH MG: 20 TABLET ORAL at 08:32

## 2018-05-14 RX ADMIN — SENNOSIDES A AND B SCH MG: 8.6 TABLET, FILM COATED ORAL at 08:31

## 2018-05-14 NOTE — RAD
KUB, 5/14/2018:

 

HISTORY: Abdominal pain

 

There is gas and a moderate amount stool scattered throughout the colon in

a nonspecific pattern. There is no evidence of organomegaly. Faint pelvic 

calcifications are probably vascular. Surgical pins are present in the 

right hip. There are mild scattered degenerative changes in the lumbar 

spine.

 

IMPRESSION: No acute abdominal abnormality is detected.

 

Electronically signed by: Rick Moritz, MD (5/14/2018 7:57 AM) [facility]

## 2018-05-14 NOTE — PDOC
Exam


Note:


Shai Note:


Please also refer to the separate dictated note~for this date of service 

dictated separately.~Patient seen individually. Discussed the patient with 

Nursing staff reviewed the chart.~Reviewed interim history and current 

functioning. Reviewed vital signs,~Labs/ Radiology~and current medications 

noted below. Continue current treatment with the changes noted in the dictated 

addendum note





Assessment:


Vital Signs:





 Vital Signs








  Date Time  Temp Pulse Resp B/P (MAP) Pulse Ox O2 Delivery O2 Flow Rate FiO2


 


5/14/18 15:50 97.5 87 22 128/81 (97) 98   


 


5/14/18 05:43      Room Air  








I&O











Intake and Output 


 


 5/14/18





 07:00


 


Intake Total 720 ml


 


Output Total 1250 ml


 


Balance -530 ml


 


 


 


Intake Oral 720 ml


 


Output Urine Total 1250 ml











Current Medications:


Meds:





Current Medications


Multi-Ingredient Ointment (Analgesic Balm) 1 bob PRN QID  PRN TP MUSCLE PAIN;  

Start 5/5/18 at 15:00


Acetaminophen (Tylenol) 650 mg PRN Q6HRS  PRN PO PAIN / TEMP Last administered 

on 5/11/18at 10:18;  Start 5/5/18 at 15:30


Baclofen (Lioresal) 10 mg BID PO  Last administered on 5/14/18at 19:28;  Start 5 /5/18 at 21:00


Bisacodyl (Dulcolax Supp) 10 mg PRN DAILY  PRN RC CONSTIPATION Last 

administered on 5/13/18at 05:47;  Start 5/5/18 at 15:30


Docusate Sodium (Colace) 100 mg PRN BID  PRN PO CONSTIPATION;  Start 5/5/18 at 

15:30


Famotidine (Pepcid) 20 mg BIDBFRMEAL PO  Last administered on 5/14/18at 16:58;  

Start 5/5/18 at 16:30


Finasteride (Proscar) 5 mg DAILY PO  Last administered on 5/14/18at 08:31;  

Start 5/6/18 at 09:00


Haloperidol (Haldol) 0.5 mg PRN TID  PRN PO AGITATION Last administered on 5/11/ 18at 12:42;  Start 5/5/18 at 15:30


Lorazepam (Ativan) 0.5 mg TID PRN  PRN PO ANXIETY / AGITATION Last administered 

on 5/14/18at 16:58;  Start 5/5/18 at 15:30


Ondansetron HCl (Zofran Odt) 4 mg PRN Q6HRS  PRN PO NAUSEA/VOMITING;  Start 5/5/ 18 at 15:30


Sennosides (Senna) 8.6 mg BID PO  Last administered on 5/14/18at 19:27;  Start 5 /5/18 at 21:00


Sennosides (Senna) 8.6 mg PRN DAILY  PRN PO CONSTIPATION;  Start 5/5/18 at 15:30


Lithium Carbonate 300 mg BID PO  Last administered on 5/8/18at 11:17;  Start 5/5 /18 at 21:00;  Stop 5/8/18 at 13:52;  Status DC


Oxycodone HCl (Roxicodone) 5 mg PRN Q6HRS  PRN PO PAIN Last administered on 5/13 /18at 21:14;  Start 5/5/18 at 15:45


Terazosin HCl (Hytrin) 10 mg DAILY PO  Last administered on 5/14/18at 08:31;  

Start 5/6/18 at 09:00


Amitriptyline HCl (Elavil) 75 mg QHS PO  Last administered on 5/6/18at 20:00;  

Start 5/5/18 at 21:00;  Stop 5/7/18 at 19:22;  Status DC


Cetirizine HCl (ZyrTEC) 10 mg DAILY PO  Last administered on 5/14/18at 08:32;  

Start 5/6/18 at 09:00


Olanzapine (ZyPREXA ZYDIS) 2.5 mg PRN Q2HR  PRN PO Psychosis;  Start 5/5/18 at 

15:45;  Stop 5/8/18 at 11:33;  Status DC


Multi-Ingredient Ointment (Analgesic Balm) 1 bob PRN QID  PRN TP MUSCLE PAIN;  

Start 5/5/18 at 15:45;  Status Cancel


Al Hydroxide/Mg Hydroxide (Mylanta Plus Xs) 15 ml PRN AFTMEALHC  PRN PO 

DYSPEPSIA;  Start 5/5/18 at 15:45


Magnesium Hydroxide (Milk Of Magnesia) 2,400 mg PRN QHS  PRN PO CONSTIPATION 

Last administered on 5/8/18at 12:05;  Start 5/5/18 at 15:45


Enoxaparin Sodium (Lovenox) 40 mg Q24H SQ  Last administered on 5/5/18at 20:19;

  Start 5/5/18 at 21:00;  Stop 5/6/18 at 14:41;  Status DC


Enoxaparin Sodium (Lovenox) 30 mg Q24H SQ  Last administered on 5/14/18at 19:27

;  Start 5/6/18 at 21:00


Risperidone (RisperDAL) 0.25 mg QHS PO  Last administered on 5/6/18at 20:00;  

Start 5/6/18 at 21:00;  Stop 5/7/18 at 19:22;  Status DC


Vitamin D (Vitamin D3) 50,000 unit WEEKLY PO  Last administered on 5/8/18at 11:

18;  Start 5/8/18 at 09:00


Amitriptyline HCl (Elavil) 50 mg QHS PO  Last administered on 5/7/18at 19:46;  

Start 5/7/18 at 21:00;  Stop 5/8/18 at 19:46;  Status DC


Risperidone (RisperDAL) 0.5 mg QHS PO  Last administered on 5/11/18at 20:23;  

Start 5/7/18 at 21:00;  Stop 5/12/18 at 20:19;  Status DC


Olanzapine (ZyPREXA ZYDIS) 5 mg PRN Q2HR  PRN PO Psychosis Last administered on 

5/14/18at 16:58;  Start 5/8/18 at 11:45


Lithium Citrate 8 meq BID PO ;  Start 5/8/18 at 21:00;  Stop 5/8/18 at 21:00;  

Status DC


Trazodone HCl (Desyrel) 50 mg PRN QHS  PRN PO INSOMNIA Last administered on 5/13 /18at 21:14;  Start 5/8/18 at 19:45


Mirtazapine (Remeron) 7.5 mg QHS PO  Last administered on 5/14/18at 19:28;  

Start 5/9/18 at 21:00


Lithium Carbonate 150 mg DAILY PO  Last administered on 5/14/18at 08:31;  Start 

5/11/18 at 09:00


Lithium Carbonate 300 mg HS PO  Last administered on 5/14/18at 19:28;  Start 5/

10/18 at 21:00


Nystatin (Nystop) 15 bob STK-MED ONCE TP  Last administered on 5/11/18at 22:17;

  Start 5/11/18 at 22:17;  Stop 5/11/18 at 22:18;  Status DC


Risperidone (RisperDAL) 0.75 mg QHS PO  Last administered on 5/14/18at 19:28;  

Start 5/12/18 at 21:00


Polyethylene Glycol (miraLAX) 17 gm BID PO  Last administered on 5/14/18at 19:27

;  Start 5/14/18 at 09:00





Active Scripts


Active


Reported


Amitriptyline Hcl 75 Mg Tablet 75 Mg PO QHS


Oxycodone Hcl 5 Mg Capsule 5 Mg PO PRN Q6HRS PRN


Lorazepam 0.5 Mg Tablet 0.5 Mg PO TID PRN PRN


Famotidine 20 Mg Tablet 20 Mg PO BIDBFRMEAL


Haloperidol 2 Mg Tablet 0.5 Mg PO PRN TID PRN


Terazosin Hcl 10 Mg Capsule 10 Mg PO DAILY


Ondansetron Odt (Ondansetron) 4 Mg Tab.rapdis 4 Mg PO PRN Q6HRS


Milk Of Magnesia (Magnesium Hydroxide) 2,400 Mg/10 Ml Oral.susp 2,400 Mg PO PRN 

QID PRN


Loratadine 10 Mg Tablet 10 Mg PO 


Lithium Carbonate 300 Mg Capsule 300 Mg PO BID


Baclofen 10 Mg Tablet 10 Mg PO BID


Maalox Maximum Strength Susp (Mag Hydrox/Al Hydrox/Simeth) 355 Ml Oral.susp 15 

Ml PO PRN Q6HRS PRN


Finasteride 5 Mg Tablet 5 Mg PO DAILY


Colace (Docusate Sodium) 100 Mg Capsule 100 Mg PO PRN BID PRN


Senokot (Sennosides) 8.6 Mg Tablet 8.6 Mg PO PRN DAILY PRN


Senokot (Sennosides) 8.6 Mg Tablet 8.6 Mg PO BID


Bisacodyl 10 Mg Supp.rect 10 Mg RC PRN DAILY PRN


Tylenol (Acetaminophen) 325 Mg Tablet 650 Mg PO PRN Q6HRS PRN


I have reviewed the current psychotropics carefully including drug 

interactions.  Risk benefit ratio favors no change other than as noted in my 

dictated progress note.





Diagnosis:


Problems:  


(1) Anxiety disorder


(2) Bipolar affective, mixed, sev w/ psych


(3) Dementia, vascular, with delusions


(4) Mild cognitive impairment











DIANA STALLINGS MD May 14, 2018 20:48

## 2018-05-14 NOTE — PN
DATE:  05/12/2018



This is a late entry, 05/12/2018, covers the elements not covered in my initial

note, 05/12/2018.



I met with the patient in the evening.  The patient slept 6-3/4 hours the

previous night.  Appetite is poor, though he takes the fluids better, refuses

boost, received Ativan for anxiety at 10 a.m.  He is anxious, afraid, paranoid,

believing something is behind him.  "She is a clone, she is going to hurt me."



REVIEW OF SYSTEMS:  Ambulation impaired, in a Broda chair.  No CV, ,

pulmonary, eye, ENT system symptoms on review.



MENTAL STATUS EXAM:  Oriented to himself and situation.  Speech coherent, has

some latency.  Abstraction fair, computation impaired, language function intact,

attention span short.  Mood and affect somewhat anxious, labile.



LABORATORY DATA:  Reviewed.



IMPRESSION:  Bipolar 1 disorder, mixed with psychotic features.  Rest unchanged.



PLAN:  Increase Risperdal from 0.5 mg at bedtime to 0.75 mg at bedtime. 

Continue rest unchanged including lithium 150 mg before meals and 300 at

bedtime, level is 0.7.





______________________________

MAN COBY STALLINGS MD



DR:  BRIDGETT/timbo  JOB#:  [id number] / [id number]

DD:  05/13/2018 22:06  DT:  05/14/2018 00:10

## 2018-05-15 VITALS — SYSTOLIC BLOOD PRESSURE: 127 MMHG | DIASTOLIC BLOOD PRESSURE: 81 MMHG

## 2018-05-15 VITALS — SYSTOLIC BLOOD PRESSURE: 101 MMHG | DIASTOLIC BLOOD PRESSURE: 74 MMHG

## 2018-05-15 RX ADMIN — LITHIUM CARBONATE SCH MG: 300 TABLET ORAL at 19:36

## 2018-05-15 RX ADMIN — FAMOTIDINE SCH MG: 20 TABLET ORAL at 08:15

## 2018-05-15 RX ADMIN — BACLOFEN SCH MG: 10 TABLET ORAL at 19:37

## 2018-05-15 RX ADMIN — SENNOSIDES A AND B SCH MG: 8.6 TABLET, FILM COATED ORAL at 08:15

## 2018-05-15 RX ADMIN — ENOXAPARIN SODIUM SCH MG: 100 INJECTION SUBCUTANEOUS at 19:38

## 2018-05-15 RX ADMIN — BACLOFEN SCH MG: 10 TABLET ORAL at 08:15

## 2018-05-15 RX ADMIN — HALOPERIDOL PRN MG: 2 TABLET ORAL at 12:30

## 2018-05-15 RX ADMIN — FAMOTIDINE SCH MG: 20 TABLET ORAL at 16:14

## 2018-05-15 RX ADMIN — POLYETHYLENE GLYCOL 3350 SCH GM: 17 POWDER, FOR SOLUTION ORAL at 19:38

## 2018-05-15 RX ADMIN — RISPERIDONE SCH MG: 0.5 TABLET ORAL at 19:37

## 2018-05-15 RX ADMIN — SENNOSIDES A AND B SCH MG: 8.6 TABLET, FILM COATED ORAL at 19:37

## 2018-05-15 RX ADMIN — CHOLECALCIFEROL CAP 1.25 MG (50000 UNIT) SCH UNIT: 1.25 CAP at 08:16

## 2018-05-15 RX ADMIN — LITHIUM CARBONATE SCH MG: 150 CAPSULE, GELATIN COATED ORAL at 08:16

## 2018-05-15 RX ADMIN — FINASTERIDE SCH MG: 5 TABLET, FILM COATED ORAL at 08:16

## 2018-05-15 RX ADMIN — CETIRIZINE HYDROCHLORIDE SCH MG: 10 TABLET, FILM COATED ORAL at 08:15

## 2018-05-15 RX ADMIN — POLYETHYLENE GLYCOL 3350 SCH GM: 17 POWDER, FOR SOLUTION ORAL at 08:14

## 2018-05-15 RX ADMIN — TERAZOSIN HYDROCHLORIDE SCH MG: 5 CAPSULE ORAL at 08:16

## 2018-05-15 RX ADMIN — MIRTAZAPINE SCH MG: 7.5 TABLET, FILM COATED ORAL at 19:37

## 2018-05-15 NOTE — PDOC
Exam


Note:


Shai Note:


Please also refer to the separate dictated note~for this date of service 

dictated separately.~Patient seen individually. Discussed the patient with 

Nursing staff reviewed the chart.~Reviewed interim history and current 

functioning. Reviewed vital signs,~Labs/ Radiology~and current medications 

noted below. Continue current treatment with the changes noted in the dictated 

addendum note





Assessment:


Vital Signs:





 Vital Signs








  Date Time  Temp Pulse Resp B/P (MAP) Pulse Ox O2 Delivery O2 Flow Rate FiO2


 


5/15/18 16:30 97.9 73 18 101/74 (83) 99   


 


5/15/18 13:45      Room Air  








I&O











Intake and Output 


 


 5/15/18





 07:00


 


Intake Total 720 ml


 


Output Total 600 ml


 


Balance 120 ml


 


 


 


Intake Oral 720 ml


 


Output Urine Total 600 ml











Current Medications:


Meds:





Current Medications


Multi-Ingredient Ointment (Analgesic Balm) 1 bob PRN QID  PRN TP MUSCLE PAIN;  

Start 5/5/18 at 15:00


Acetaminophen (Tylenol) 650 mg PRN Q6HRS  PRN PO PAIN / TEMP Last administered 

on 5/11/18at 10:18;  Start 5/5/18 at 15:30


Baclofen (Lioresal) 10 mg BID PO  Last administered on 5/15/18at 19:37;  Start 5 /5/18 at 21:00


Bisacodyl (Dulcolax Supp) 10 mg PRN DAILY  PRN RC CONSTIPATION Last 

administered on 5/13/18at 05:47;  Start 5/5/18 at 15:30


Docusate Sodium (Colace) 100 mg PRN BID  PRN PO CONSTIPATION;  Start 5/5/18 at 

15:30


Famotidine (Pepcid) 20 mg BIDBFRMEAL PO  Last administered on 5/15/18at 16:14;  

Start 5/5/18 at 16:30


Finasteride (Proscar) 5 mg DAILY PO  Last administered on 5/15/18at 08:16;  

Start 5/6/18 at 09:00


Haloperidol (Haldol) 0.5 mg PRN TID  PRN PO AGITATION Last administered on 5/15/

18at 12:30;  Start 5/5/18 at 15:30


Lorazepam (Ativan) 0.5 mg TID PRN  PRN PO ANXIETY / AGITATION Last administered 

on 5/15/18at 08:16;  Start 5/5/18 at 15:30


Ondansetron HCl (Zofran Odt) 4 mg PRN Q6HRS  PRN PO NAUSEA/VOMITING;  Start 5/5/ 18 at 15:30


Sennosides (Senna) 8.6 mg BID PO  Last administered on 5/15/18at 19:37;  Start 5 /5/18 at 21:00


Sennosides (Senna) 8.6 mg PRN DAILY  PRN PO CONSTIPATION;  Start 5/5/18 at 15:30


Lithium Carbonate 300 mg BID PO  Last administered on 5/8/18at 11:17;  Start 5/5 /18 at 21:00;  Stop 5/8/18 at 13:52;  Status DC


Oxycodone HCl (Roxicodone) 5 mg PRN Q6HRS  PRN PO PAIN Last administered on 5/15

/18at 12:32;  Start 5/5/18 at 15:45


Terazosin HCl (Hytrin) 10 mg DAILY PO  Last administered on 5/15/18at 08:16;  

Start 5/6/18 at 09:00


Amitriptyline HCl (Elavil) 75 mg QHS PO  Last administered on 5/6/18at 20:00;  

Start 5/5/18 at 21:00;  Stop 5/7/18 at 19:22;  Status DC


Cetirizine HCl (ZyrTEC) 10 mg DAILY PO  Last administered on 5/15/18at 08:15;  

Start 5/6/18 at 09:00


Olanzapine (ZyPREXA ZYDIS) 2.5 mg PRN Q2HR  PRN PO Psychosis;  Start 5/5/18 at 

15:45;  Stop 5/8/18 at 11:33;  Status DC


Multi-Ingredient Ointment (Analgesic Balm) 1 bob PRN QID  PRN TP MUSCLE PAIN;  

Start 5/5/18 at 15:45;  Status Cancel


Al Hydroxide/Mg Hydroxide (Mylanta Plus Xs) 15 ml PRN AFTMEALHC  PRN PO 

DYSPEPSIA;  Start 5/5/18 at 15:45


Magnesium Hydroxide (Milk Of Magnesia) 2,400 mg PRN QHS  PRN PO CONSTIPATION 

Last administered on 5/8/18at 12:05;  Start 5/5/18 at 15:45


Enoxaparin Sodium (Lovenox) 40 mg Q24H SQ  Last administered on 5/5/18at 20:19;

  Start 5/5/18 at 21:00;  Stop 5/6/18 at 14:41;  Status DC


Enoxaparin Sodium (Lovenox) 30 mg Q24H SQ  Last administered on 5/15/18at 19:38

;  Start 5/6/18 at 21:00


Risperidone (RisperDAL) 0.25 mg QHS PO  Last administered on 5/6/18at 20:00;  

Start 5/6/18 at 21:00;  Stop 5/7/18 at 19:22;  Status DC


Vitamin D (Vitamin D3) 50,000 unit WEEKLY PO  Last administered on 5/15/18at 08:

16;  Start 5/8/18 at 09:00


Amitriptyline HCl (Elavil) 50 mg QHS PO  Last administered on 5/7/18at 19:46;  

Start 5/7/18 at 21:00;  Stop 5/8/18 at 19:46;  Status DC


Risperidone (RisperDAL) 0.5 mg QHS PO  Last administered on 5/11/18at 20:23;  

Start 5/7/18 at 21:00;  Stop 5/12/18 at 20:19;  Status DC


Olanzapine (ZyPREXA ZYDIS) 5 mg PRN Q2HR  PRN PO Psychosis Last administered on 

5/14/18at 16:58;  Start 5/8/18 at 11:45


Lithium Citrate 8 meq BID PO ;  Start 5/8/18 at 21:00;  Stop 5/8/18 at 21:00;  

Status DC


Trazodone HCl (Desyrel) 50 mg PRN QHS  PRN PO INSOMNIA Last administered on 5/13 /18at 21:14;  Start 5/8/18 at 19:45


Mirtazapine (Remeron) 7.5 mg QHS PO  Last administered on 5/15/18at 19:37;  

Start 5/9/18 at 21:00


Lithium Carbonate 150 mg DAILY PO  Last administered on 5/15/18at 08:16;  Start 

5/11/18 at 09:00


Lithium Carbonate 300 mg HS PO  Last administered on 5/15/18at 19:36;  Start 5/

10/18 at 21:00


Nystatin (Nystop) 15 bob STK-MED ONCE TP  Last administered on 5/11/18at 22:17;

  Start 5/11/18 at 22:17;  Stop 5/11/18 at 22:18;  Status DC


Risperidone (RisperDAL) 0.75 mg QHS PO  Last administered on 5/15/18at 19:37;  

Start 5/12/18 at 21:00


Polyethylene Glycol (miraLAX) 17 gm BID PO  Last administered on 5/15/18at 19:38

;  Start 5/14/18 at 09:00





Active Scripts


Active


Reported


Amitriptyline Hcl 75 Mg Tablet 75 Mg PO QHS


Oxycodone Hcl 5 Mg Capsule 5 Mg PO PRN Q6HRS PRN


Lorazepam 0.5 Mg Tablet 0.5 Mg PO TID PRN PRN


Famotidine 20 Mg Tablet 20 Mg PO BIDBFRMEAL


Haloperidol 2 Mg Tablet 0.5 Mg PO PRN TID PRN


Terazosin Hcl 10 Mg Capsule 10 Mg PO DAILY


Ondansetron Odt (Ondansetron) 4 Mg Tab.rapdis 4 Mg PO PRN Q6HRS


Milk Of Magnesia (Magnesium Hydroxide) 2,400 Mg/10 Ml Oral.susp 2,400 Mg PO PRN 

QID PRN


Loratadine 10 Mg Tablet 10 Mg PO 


Lithium Carbonate 300 Mg Capsule 300 Mg PO BID


Baclofen 10 Mg Tablet 10 Mg PO BID


Maalox Maximum Strength Susp (Mag Hydrox/Al Hydrox/Simeth) 355 Ml Oral.susp 15 

Ml PO PRN Q6HRS PRN


Finasteride 5 Mg Tablet 5 Mg PO DAILY


Colace (Docusate Sodium) 100 Mg Capsule 100 Mg PO PRN BID PRN


Senokot (Sennosides) 8.6 Mg Tablet 8.6 Mg PO PRN DAILY PRN


Senokot (Sennosides) 8.6 Mg Tablet 8.6 Mg PO BID


Bisacodyl 10 Mg Supp.rect 10 Mg RC PRN DAILY PRN


Tylenol (Acetaminophen) 325 Mg Tablet 650 Mg PO PRN Q6HRS PRN


I have reviewed the current psychotropics carefully including drug 

interactions.  Risk benefit ratio favors no change other than as noted in my 

dictated progress note.





Diagnosis:


Problems:  


(1) Anxiety disorder


(2) Bipolar affective, mixed, sev w/ psych


(3) Dementia, vascular, with delusions


(4) Mild cognitive impairment











DIANA STALLINGS MD May 15, 2018 20:16

## 2018-05-15 NOTE — PN
DATE:  05/14/2018



PSYCHIATRIC PROGRESS NOTE



This is a late entry for 05/14/2018, covers elements not covered in my initial

note of 05/14/2018.



SUBJECTIVE:  I met with the patient in the evening.  The patient slept 5-1/2

hours, took his medications in the morning, took them crushed.  He has had no

bowel movements and KUB has been done.  Deferred to Dr. Hodges and Dr. Wallace. 

He is tearful at times.



REVIEW OF SYSTEMS:  Ambulation impaired, in wheelchair.  No CV, , pulmonary,

eye system symptoms on review.



MENTAL STATUS EXAM:  Oriented to himself and situation.  Speech, low in volume,

often responses monosyllabic.  Abstraction fair, computation impaired, language

function intact, attention span short.  Mood and affect somewhat withdrawn.



LABORATORY DATA:  Reviewed.



IMPRESSION:  Unchanged from initial note.



PLAN:  Continue current psychotropics.  Check lithium level, adjust as

indicated.





______________________________

MAN COBY STALLINGS MD



DR:  [email or web address]  JOB#:  [id number] / [id number]

DD:  05/15/2018 16:14  DT:  05/15/2018 18:06

## 2018-05-15 NOTE — PN
DATE:  05/13/2018



This late entry 05/13/2018 covers elements not covered in my initial note

05/13/2018.  I met with the patient in the evening.  The patient has had a

better day previous night.  He was psychotic, delusional with nursing staff.  He

has had a distended abdomen, received suppository, takes his meds in ice cream. 

Lithium level 0.7.



REVIEW OF SYSTEMS:  Ambulation impaired, in wheelchair.  No CV, , pulmonary,

eye, ENT system symptoms on review.



MENTAL STATUS EXAM:  Oriented to himself and situation.  Speech moderate

latency, often responses monosyllabic.  Abstraction fair, computation impaired,

language function intact.  Mood and affect remains somewhat withdrawn.



LABORATORY DATA:  Reviewed.



IMPRESSION:  Unchanged from initial note.



PLAN:  Continue current psychotropics.  Adjust as clinically indicated.





______________________________

MAN COBY STALLINGS MD



DR:  [email or web address]  JOB#:  [id number] / [id number]

DD:  05/14/2018 16:27  DT:  05/15/2018 05:05

## 2018-05-16 VITALS — SYSTOLIC BLOOD PRESSURE: 119 MMHG | DIASTOLIC BLOOD PRESSURE: 73 MMHG

## 2018-05-16 VITALS — DIASTOLIC BLOOD PRESSURE: 59 MMHG | SYSTOLIC BLOOD PRESSURE: 112 MMHG

## 2018-05-16 VITALS — SYSTOLIC BLOOD PRESSURE: 178 MMHG | DIASTOLIC BLOOD PRESSURE: 89 MMHG

## 2018-05-16 LAB
ALBUMIN SERPL-MCNC: 2.6 G/DL (ref 3.4–5)
ALBUMIN/GLOB SERPL: 0.6 {RATIO} (ref 1–1.7)
ALP SERPL-CCNC: 84 U/L (ref 46–116)
ALT SERPL-CCNC: 21 U/L (ref 16–63)
ANION GAP SERPL CALC-SCNC: 5 MMOL/L (ref 6–14)
AST SERPL-CCNC: 21 U/L (ref 15–37)
BASOPHILS # BLD AUTO: 0.1 X10^3/UL (ref 0–0.2)
BASOPHILS NFR BLD: 1 % (ref 0–3)
BILIRUB SERPL-MCNC: 0.4 MG/DL (ref 0.2–1)
BUN/CREAT SERPL: 18 (ref 6–20)
CA-I SERPL ISE-MCNC: 25 MG/DL (ref 8–26)
CALCIUM SERPL-MCNC: 9.4 MG/DL (ref 8.5–10.1)
CHLORIDE SERPL-SCNC: 105 MMOL/L (ref 98–107)
CO2 SERPL-SCNC: 29 MMOL/L (ref 21–32)
CREAT SERPL-MCNC: 1.4 MG/DL (ref 0.7–1.3)
EOSINOPHIL NFR BLD: 0.1 X10^3/UL (ref 0–0.7)
EOSINOPHIL NFR BLD: 1 % (ref 0–3)
ERYTHROCYTE [DISTWIDTH] IN BLOOD BY AUTOMATED COUNT: 15.5 % (ref 11.5–14.5)
GFR SERPLBLD BASED ON 1.73 SQ M-ARVRAT: 49.7 ML/MIN
GLOBULIN SER-MCNC: 4.4 G/DL (ref 2.2–3.8)
GLUCOSE SERPL-MCNC: 103 MG/DL (ref 70–99)
HCT VFR BLD CALC: 42.3 % (ref 39–53)
HGB BLD-MCNC: 13.6 G/DL (ref 13–17.5)
LITHIUM SERPL-SCNC: 0.9 MMOL/L (ref 0.6–1.2)
LYMPHOCYTES # BLD: 2.6 X10^3/UL (ref 1–4.8)
LYMPHOCYTES NFR BLD AUTO: 28 % (ref 24–48)
MCH RBC QN AUTO: 29 PG (ref 25–35)
MCHC RBC AUTO-ENTMCNC: 32 G/DL (ref 31–37)
MCV RBC AUTO: 92 FL (ref 79–100)
MONO #: 1 X10^3/UL (ref 0–1.1)
MONOCYTES NFR BLD: 10 % (ref 0–9)
NEUT #: 5.5 X10^3UL (ref 1.8–7.7)
NEUTROPHILS NFR BLD AUTO: 60 % (ref 31–73)
PLATELET # BLD AUTO: 220 X10^3/UL (ref 140–400)
POTASSIUM SERPL-SCNC: 4.4 MMOL/L (ref 3.5–5.1)
PROT SERPL-MCNC: 7 G/DL (ref 6.4–8.2)
RBC # BLD AUTO: 4.62 X10^6/UL (ref 4.3–5.7)
SODIUM SERPL-SCNC: 139 MMOL/L (ref 136–145)
WBC # BLD AUTO: 9.2 X10^3/UL (ref 4–11)

## 2018-05-16 RX ADMIN — FAMOTIDINE SCH MG: 20 TABLET ORAL at 16:26

## 2018-05-16 RX ADMIN — RISPERIDONE SCH MG: 1 TABLET, FILM COATED ORAL at 21:02

## 2018-05-16 RX ADMIN — BACLOFEN SCH MG: 10 TABLET ORAL at 08:21

## 2018-05-16 RX ADMIN — POLYETHYLENE GLYCOL 3350 SCH GM: 17 POWDER, FOR SOLUTION ORAL at 21:00

## 2018-05-16 RX ADMIN — SENNOSIDES A AND B SCH MG: 8.6 TABLET, FILM COATED ORAL at 08:21

## 2018-05-16 RX ADMIN — MIRTAZAPINE SCH MG: 7.5 TABLET, FILM COATED ORAL at 21:00

## 2018-05-16 RX ADMIN — LITHIUM CARBONATE SCH MG: 150 CAPSULE, GELATIN COATED ORAL at 08:22

## 2018-05-16 RX ADMIN — FINASTERIDE SCH MG: 5 TABLET, FILM COATED ORAL at 08:22

## 2018-05-16 RX ADMIN — ENOXAPARIN SODIUM SCH MG: 100 INJECTION SUBCUTANEOUS at 21:00

## 2018-05-16 RX ADMIN — TERAZOSIN HYDROCHLORIDE SCH MG: 5 CAPSULE ORAL at 08:21

## 2018-05-16 RX ADMIN — SENNOSIDES A AND B SCH MG: 8.6 TABLET, FILM COATED ORAL at 21:00

## 2018-05-16 RX ADMIN — POLYETHYLENE GLYCOL 3350 SCH GM: 17 POWDER, FOR SOLUTION ORAL at 08:22

## 2018-05-16 RX ADMIN — BACLOFEN SCH MG: 10 TABLET ORAL at 21:00

## 2018-05-16 RX ADMIN — LITHIUM CARBONATE SCH MG: 300 TABLET ORAL at 21:00

## 2018-05-16 RX ADMIN — CETIRIZINE HYDROCHLORIDE SCH MG: 10 TABLET, FILM COATED ORAL at 08:21

## 2018-05-16 RX ADMIN — FAMOTIDINE SCH MG: 20 TABLET ORAL at 08:22

## 2018-05-16 NOTE — PDOC
Exam


Note:


Shai Note:


Please also refer to the separate dictated note~for this date of service 

dictated separately.~Patient seen individually. Discussed the patient with 

Nursing staff reviewed the chart.~Reviewed interim history and current 

functioning. Reviewed vital signs,~Labs/ Radiology~and current medications 

noted below. Continue current treatment with the changes noted in the dictated 

addendum note





Assessment:


Vital Signs:





 Vital Signs








  Date Time  Temp Pulse Resp B/P (MAP) Pulse Ox O2 Delivery O2 Flow Rate FiO2


 


5/16/18 20:38 98.3 87 24 178/89 (118) 94   


 


5/15/18 13:45      Room Air  








I&O











Intake and Output 


 


 5/16/18





 07:00


 


Intake Total 1020 ml


 


Output Total 600 ml


 


Balance 420 ml


 


 


 


Intake Oral 1020 ml


 


Output Urine Total 600 ml








Labs:





 Laboratory Tests








Test


 5/16/18


07:38


 


White Blood Count


 9.2 x10^3/uL


(4.0-11.0)


 


Red Blood Count


 4.62 x10^6/uL


(4.30-5.70)


 


Hemoglobin


 13.6 g/dL


(13.0-17.5)


 


Hematocrit


 42.3 %


(39.0-53.0)


 


Mean Corpuscular Volume


 92 fL ()





 


Mean Corpuscular Hemoglobin 29 pg (25-35)  


 


Mean Corpuscular Hemoglobin


Concent 32 g/dL


(31-37)


 


Red Cell Distribution Width


 15.5 %


(11.5-14.5)  H


 


Platelet Count


 220 x10^3/uL


(140-400)


 


Neutrophils (%) (Auto) 60 % (31-73)  


 


Lymphocytes (%) (Auto) 28 % (24-48)  


 


Monocytes (%) (Auto) 10 % (0-9)  H


 


Eosinophils (%) (Auto) 1 % (0-3)  


 


Basophils (%) (Auto) 1 % (0-3)  


 


Neutrophils # (Auto)


 5.5 x10^3uL


(1.8-7.7)


 


Lymphocytes # (Auto)


 2.6 x10^3/uL


(1.0-4.8)


 


Monocytes # (Auto)


 1.0 x10^3/uL


(0.0-1.1)


 


Eosinophils # (Auto)


 0.1 x10^3/uL


(0.0-0.7)


 


Basophils # (Auto)


 0.1 x10^3/uL


(0.0-0.2)


 


Sodium Level


 139 mmol/L


(136-145)


 


Potassium Level


 4.4 mmol/L


(3.5-5.1)


 


Chloride Level


 105 mmol/L


()


 


Carbon Dioxide Level


 29 mmol/L


(21-32)


 


Anion Gap 5 (6-14)  L


 


Blood Urea Nitrogen


 25 mg/dL


(8-26)


 


Creatinine


 1.4 mg/dL


(0.7-1.3)  H


 


Estimated GFR


(Cockcroft-Gault) 49.7  





 


BUN/Creatinine Ratio 18 (6-20)  


 


Glucose Level


 103 mg/dL


(70-99)  H


 


Calcium Level


 9.4 mg/dL


(8.5-10.1)


 


Total Bilirubin


 0.4 mg/dL


(0.2-1.0)


 


Aspartate Amino Transferase


(AST) 21 U/L (15-37)





 


Alanine Aminotransferase (ALT)


 21 U/L (16-63)





 


Alkaline Phosphatase


 84 U/L


()


 


Total Protein


 7.0 g/dL


(6.4-8.2)


 


Albumin


 2.6 g/dL


(3.4-5.0)  L


 


Albumin/Globulin Ratio


 0.6 (1.0-1.7)


L











Current Medications:


Meds:





Current Medications


Multi-Ingredient Ointment (Analgesic Balm) 1 bob PRN QID  PRN TP MUSCLE PAIN;  

Start 5/5/18 at 15:00


Acetaminophen (Tylenol) 650 mg PRN Q6HRS  PRN PO PAIN / TEMP Last administered 

on 5/11/18at 10:18;  Start 5/5/18 at 15:30


Baclofen (Lioresal) 10 mg BID PO  Last administered on 5/16/18at 21:00;  Start 5 /5/18 at 21:00


Bisacodyl (Dulcolax Supp) 10 mg PRN DAILY  PRN RC CONSTIPATION Last 

administered on 5/13/18at 05:47;  Start 5/5/18 at 15:30


Docusate Sodium (Colace) 100 mg PRN BID  PRN PO CONSTIPATION;  Start 5/5/18 at 

15:30


Famotidine (Pepcid) 20 mg BIDBFRMEAL PO  Last administered on 5/16/18at 16:26;  

Start 5/5/18 at 16:30


Finasteride (Proscar) 5 mg DAILY PO  Last administered on 5/16/18at 08:22;  

Start 5/6/18 at 09:00


Haloperidol (Haldol) 0.5 mg PRN TID  PRN PO AGITATION Last administered on 5/15/

18at 12:30;  Start 5/5/18 at 15:30


Lorazepam (Ativan) 0.5 mg TID PRN  PRN PO ANXIETY / AGITATION Last administered 

on 5/16/18at 16:26;  Start 5/5/18 at 15:30


Ondansetron HCl (Zofran Odt) 4 mg PRN Q6HRS  PRN PO NAUSEA/VOMITING;  Start 5/5/ 18 at 15:30


Sennosides (Senna) 8.6 mg BID PO  Last administered on 5/16/18at 21:00;  Start 5 /5/18 at 21:00


Sennosides (Senna) 8.6 mg PRN DAILY  PRN PO CONSTIPATION;  Start 5/5/18 at 15:30


Lithium Carbonate 300 mg BID PO  Last administered on 5/8/18at 11:17;  Start 5/5 /18 at 21:00;  Stop 5/8/18 at 13:52;  Status DC


Oxycodone HCl (Roxicodone) 5 mg PRN Q6HRS  PRN PO PAIN Last administered on 5/15

/18at 12:32;  Start 5/5/18 at 15:45


Terazosin HCl (Hytrin) 10 mg DAILY PO  Last administered on 5/16/18at 08:21;  

Start 5/6/18 at 09:00


Amitriptyline HCl (Elavil) 75 mg QHS PO  Last administered on 5/6/18at 20:00;  

Start 5/5/18 at 21:00;  Stop 5/7/18 at 19:22;  Status DC


Cetirizine HCl (ZyrTEC) 10 mg DAILY PO  Last administered on 5/16/18at 08:21;  

Start 5/6/18 at 09:00


Olanzapine (ZyPREXA ZYDIS) 2.5 mg PRN Q2HR  PRN PO Psychosis;  Start 5/5/18 at 

15:45;  Stop 5/8/18 at 11:33;  Status DC


Multi-Ingredient Ointment (Analgesic Balm) 1 bob PRN QID  PRN TP MUSCLE PAIN;  

Start 5/5/18 at 15:45;  Status Cancel


Al Hydroxide/Mg Hydroxide (Mylanta Plus Xs) 15 ml PRN AFTMEALHC  PRN PO 

DYSPEPSIA;  Start 5/5/18 at 15:45


Magnesium Hydroxide (Milk Of Magnesia) 2,400 mg PRN QHS  PRN PO CONSTIPATION 

Last administered on 5/8/18at 12:05;  Start 5/5/18 at 15:45


Enoxaparin Sodium (Lovenox) 40 mg Q24H SQ  Last administered on 5/5/18at 20:19;

  Start 5/5/18 at 21:00;  Stop 5/6/18 at 14:41;  Status DC


Enoxaparin Sodium (Lovenox 30mg Syringe) 30 mg Q24H SQ  Last administered on 5/ 16/18at 21:00;  Start 5/6/18 at 21:00


Risperidone (RisperDAL) 0.25 mg QHS PO  Last administered on 5/6/18at 20:00;  

Start 5/6/18 at 21:00;  Stop 5/7/18 at 19:22;  Status DC


Vitamin D (Vitamin D3) 50,000 unit WEEKLY PO  Last administered on 5/15/18at 08:

16;  Start 5/8/18 at 09:00


Amitriptyline HCl (Elavil) 50 mg QHS PO  Last administered on 5/7/18at 19:46;  

Start 5/7/18 at 21:00;  Stop 5/8/18 at 19:46;  Status DC


Risperidone (RisperDAL) 0.5 mg QHS PO  Last administered on 5/11/18at 20:23;  

Start 5/7/18 at 21:00;  Stop 5/12/18 at 20:19;  Status DC


Olanzapine (ZyPREXA ZYDIS) 5 mg PRN Q2HR  PRN PO Psychosis Last administered on 

5/14/18at 16:58;  Start 5/8/18 at 11:45


Lithium Citrate 8 meq BID PO ;  Start 5/8/18 at 21:00;  Stop 5/8/18 at 21:00;  

Status DC


Trazodone HCl (Desyrel) 50 mg PRN QHS  PRN PO INSOMNIA Last administered on 5/13 /18at 21:14;  Start 5/8/18 at 19:45


Mirtazapine (Remeron) 7.5 mg QHS PO  Last administered on 5/16/18at 21:00;  

Start 5/9/18 at 21:00


Lithium Carbonate 150 mg DAILY PO  Last administered on 5/16/18at 08:22;  Start 

5/11/18 at 09:00


Lithium Carbonate 300 mg HS PO  Last administered on 5/16/18at 21:00;  Start 5/

10/18 at 21:00


Nystatin (Nystop) 15 bob STK-MED ONCE TP  Last administered on 5/11/18at 22:17;

  Start 5/11/18 at 22:17;  Stop 5/11/18 at 22:18;  Status DC


Risperidone (RisperDAL) 0.75 mg QHS PO  Last administered on 5/15/18at 19:37;  

Start 5/12/18 at 21:00;  Stop 5/16/18 at 13:06;  Status DC


Polyethylene Glycol (miraLAX) 17 gm BID PO  Last administered on 5/16/18at 21:00

;  Start 5/14/18 at 09:00


Risperidone (RisperDAL) 1 mg QHS PO  Last administered on 5/16/18at 21:02;  

Start 5/16/18 at 21:00





Active Scripts


Active


Reported


Amitriptyline Hcl 75 Mg Tablet 75 Mg PO QHS


Oxycodone Hcl 5 Mg Capsule 5 Mg PO PRN Q6HRS PRN


Lorazepam 0.5 Mg Tablet 0.5 Mg PO TID PRN PRN


Famotidine 20 Mg Tablet 20 Mg PO BIDBFRMEAL


Haloperidol 2 Mg Tablet 0.5 Mg PO PRN TID PRN


Terazosin Hcl 10 Mg Capsule 10 Mg PO DAILY


Ondansetron Odt (Ondansetron) 4 Mg Tab.rapdis 4 Mg PO PRN Q6HRS


Milk Of Magnesia (Magnesium Hydroxide) 2,400 Mg/10 Ml Oral.susp 2,400 Mg PO PRN 

QID PRN


Loratadine 10 Mg Tablet 10 Mg PO 


Lithium Carbonate 300 Mg Capsule 300 Mg PO BID


Baclofen 10 Mg Tablet 10 Mg PO BID


Maalox Maximum Strength Susp (Mag Hydrox/Al Hydrox/Simeth) 355 Ml Oral.susp 15 

Ml PO PRN Q6HRS PRN


Finasteride 5 Mg Tablet 5 Mg PO DAILY


Colace (Docusate Sodium) 100 Mg Capsule 100 Mg PO PRN BID PRN


Senokot (Sennosides) 8.6 Mg Tablet 8.6 Mg PO PRN DAILY PRN


Senokot (Sennosides) 8.6 Mg Tablet 8.6 Mg PO BID


Bisacodyl 10 Mg Supp.rect 10 Mg RC PRN DAILY PRN


Tylenol (Acetaminophen) 325 Mg Tablet 650 Mg PO PRN Q6HRS PRN


I have reviewed the current psychotropics carefully including drug 

interactions.  Risk benefit ratio favors no change other than as noted in my 

dictated progress note.





Diagnosis:


Problems:  


(1) Anxiety disorder


(2) Bipolar affective, mixed, sev w/ psych


(3) Dementia, vascular, with delusions


(4) Mild cognitive impairment











DIANA STALLINGS MD May 16, 2018 22:08

## 2018-05-17 VITALS — DIASTOLIC BLOOD PRESSURE: 58 MMHG | SYSTOLIC BLOOD PRESSURE: 99 MMHG

## 2018-05-17 VITALS — SYSTOLIC BLOOD PRESSURE: 112 MMHG | DIASTOLIC BLOOD PRESSURE: 62 MMHG

## 2018-05-17 RX ADMIN — LITHIUM CARBONATE SCH MG: 300 TABLET ORAL at 19:29

## 2018-05-17 RX ADMIN — BACLOFEN SCH MG: 10 TABLET ORAL at 19:29

## 2018-05-17 RX ADMIN — FAMOTIDINE SCH MG: 20 TABLET ORAL at 16:43

## 2018-05-17 RX ADMIN — POLYETHYLENE GLYCOL 3350 SCH GM: 17 POWDER, FOR SOLUTION ORAL at 19:29

## 2018-05-17 RX ADMIN — FAMOTIDINE SCH MG: 20 TABLET ORAL at 08:35

## 2018-05-17 RX ADMIN — TERAZOSIN HYDROCHLORIDE SCH MG: 5 CAPSULE ORAL at 09:00

## 2018-05-17 RX ADMIN — HALOPERIDOL PRN MG: 2 TABLET ORAL at 13:07

## 2018-05-17 RX ADMIN — RISPERIDONE SCH MG: 1 TABLET, FILM COATED ORAL at 19:29

## 2018-05-17 RX ADMIN — ENOXAPARIN SODIUM SCH MG: 100 INJECTION SUBCUTANEOUS at 19:29

## 2018-05-17 RX ADMIN — POLYETHYLENE GLYCOL 3350 SCH GM: 17 POWDER, FOR SOLUTION ORAL at 08:38

## 2018-05-17 RX ADMIN — FINASTERIDE SCH MG: 5 TABLET, FILM COATED ORAL at 08:37

## 2018-05-17 RX ADMIN — LITHIUM CARBONATE SCH MG: 150 CAPSULE, GELATIN COATED ORAL at 08:37

## 2018-05-17 RX ADMIN — MIRTAZAPINE SCH MG: 7.5 TABLET, FILM COATED ORAL at 19:29

## 2018-05-17 RX ADMIN — SENNOSIDES A AND B SCH MG: 8.6 TABLET, FILM COATED ORAL at 19:29

## 2018-05-17 RX ADMIN — SENNOSIDES A AND B SCH MG: 8.6 TABLET, FILM COATED ORAL at 08:35

## 2018-05-17 RX ADMIN — CETIRIZINE HYDROCHLORIDE SCH MG: 10 TABLET, FILM COATED ORAL at 08:35

## 2018-05-17 RX ADMIN — BACLOFEN SCH MG: 10 TABLET ORAL at 08:35

## 2018-05-17 NOTE — PN
DATE:  05/15/2018



PSYCHIATRIC PROGRESS NOTE



This late entry 05/15/2018 covers elements, not covered in my initial note of

05/15/2018.



I met with the patient in the evening.  The patient slept 7-3/4 hours previous

evening.  He was quite agitated at breakfast time, received Ativan at 8:16 in

the morning, was actively hallucinating at times, talking about they are trying

to "kill me."  He was agitated at lunchtime, stating that people were coming

to beat his head.  He was better after lunch.  We will repeat lithium level and

check CBC, CMP morning of 05/16/2018.



REVIEW OF SYSTEMS:  Ambulation impaired, in Broda chair.  No CV, , pulmonary,

eye, ENT system symptoms on review.  Reliability varies.



MENTAL STATUS EXAM:  Oriented to himself and situation.  Speech has some

latency, coherent.  Abstraction fair, computation impaired, language function

intact, attention span short.  Mood and affect remain somewhat labile.



LABORATORY DATA:  Reviewed.



IMPRESSION:  Unchanged from initial note.



PLAN:  Check lithium level, CBC, CMP.  Continue rest of the psychotropics.  May

need to increase Risperdal for his ongoing psychotic symptoms.  The patient may

be a candidate for ECT if he fails adjustments in his psychotropics since he has

had ECT twice in the past.





______________________________

DIANA STALLINGS MD



DR:  [email or web address]/timbo  JOB#:  [id number] / [id number]

DD:  05/16/2018 10:57  DT:  05/17/2018 01:18

## 2018-05-17 NOTE — PDOC
Exam


Note:


Shai Note:


Please also refer to the separate dictated note~for this date of service 

dictated separately.~Patient seen individually. Discussed the patient with 

Nursing staff reviewed the chart.~Reviewed interim history and current 

functioning. Reviewed vital signs,~Labs/ Radiology~and current medications 

noted below. Continue current treatment with the changes noted in the dictated 

addendum note





Assessment:


Vital Signs:





 Vital Signs








  Date Time  Temp Pulse Resp B/P (MAP) Pulse Ox O2 Delivery O2 Flow Rate FiO2


 


5/17/18 16:39 97.2 69 16 112/62 (79) 96 Room Air  








I&O











Intake and Output 


 


 5/17/18





 07:00


 


Intake Total 120 ml


 


Output Total 500 ml


 


Balance -380 ml


 


 


 


Intake Oral 120 ml


 


Output Urine Total 500 ml











Current Medications:


Meds:





Current Medications


Multi-Ingredient Ointment (Analgesic Balm) 1 bob PRN QID  PRN TP MUSCLE PAIN;  

Start 5/5/18 at 15:00


Acetaminophen (Tylenol) 650 mg PRN Q6HRS  PRN PO PAIN / TEMP Last administered 

on 5/11/18at 10:18;  Start 5/5/18 at 15:30


Baclofen (Lioresal) 10 mg BID PO  Last administered on 5/17/18at 19:29;  Start 5 /5/18 at 21:00


Bisacodyl (Dulcolax Supp) 10 mg PRN DAILY  PRN RC CONSTIPATION Last 

administered on 5/13/18at 05:47;  Start 5/5/18 at 15:30


Docusate Sodium (Colace) 100 mg PRN BID  PRN PO CONSTIPATION;  Start 5/5/18 at 

15:30


Famotidine (Pepcid) 20 mg BIDBFRMEAL PO  Last administered on 5/17/18at 16:43;  

Start 5/5/18 at 16:30


Finasteride (Proscar) 5 mg DAILY PO  Last administered on 5/17/18at 08:37;  

Start 5/6/18 at 09:00


Haloperidol (Haldol) 0.5 mg PRN TID  PRN PO AGITATION Last administered on 5/17/ 18at 13:07;  Start 5/5/18 at 15:30;  Stop 5/17/18 at 19:12;  Status DC


Lorazepam (Ativan) 0.5 mg TID PRN  PRN PO ANXIETY / AGITATION Last administered 

on 5/16/18at 16:26;  Start 5/5/18 at 15:30


Ondansetron HCl (Zofran Odt) 4 mg PRN Q6HRS  PRN PO NAUSEA/VOMITING;  Start 5/5/ 18 at 15:30


Sennosides (Senna) 8.6 mg BID PO  Last administered on 5/17/18at 19:29;  Start 5 /5/18 at 21:00


Sennosides (Senna) 8.6 mg PRN DAILY  PRN PO CONSTIPATION;  Start 5/5/18 at 15:30


Lithium Carbonate 300 mg BID PO  Last administered on 5/8/18at 11:17;  Start 5/5 /18 at 21:00;  Stop 5/8/18 at 13:52;  Status DC


Oxycodone HCl (Roxicodone) 5 mg PRN Q6HRS  PRN PO PAIN Last administered on 5/17 /18at 13:40;  Start 5/5/18 at 15:45


Terazosin HCl (Hytrin) 10 mg DAILY PO  Last administered on 5/16/18at 08:21;  

Start 5/6/18 at 09:00


Amitriptyline HCl (Elavil) 75 mg QHS PO  Last administered on 5/6/18at 20:00;  

Start 5/5/18 at 21:00;  Stop 5/7/18 at 19:22;  Status DC


Cetirizine HCl (ZyrTEC) 10 mg DAILY PO  Last administered on 5/17/18at 08:35;  

Start 5/6/18 at 09:00


Olanzapine (ZyPREXA ZYDIS) 2.5 mg PRN Q2HR  PRN PO Psychosis;  Start 5/5/18 at 

15:45;  Stop 5/8/18 at 11:33;  Status DC


Multi-Ingredient Ointment (Analgesic Balm) 1 bob PRN QID  PRN TP MUSCLE PAIN;  

Start 5/5/18 at 15:45;  Status Cancel


Al Hydroxide/Mg Hydroxide (Mylanta Plus Xs) 15 ml PRN AFTMEALHC  PRN PO 

DYSPEPSIA;  Start 5/5/18 at 15:45


Magnesium Hydroxide (Milk Of Magnesia) 2,400 mg PRN QHS  PRN PO CONSTIPATION 

Last administered on 5/8/18at 12:05;  Start 5/5/18 at 15:45


Enoxaparin Sodium (Lovenox) 40 mg Q24H SQ  Last administered on 5/5/18at 20:19;

  Start 5/5/18 at 21:00;  Stop 5/6/18 at 14:41;  Status DC


Enoxaparin Sodium (Lovenox 30mg Syringe) 30 mg Q24H SQ  Last administered on 5/ 17/18at 19:29;  Start 5/6/18 at 21:00


Risperidone (RisperDAL) 0.25 mg QHS PO  Last administered on 5/6/18at 20:00;  

Start 5/6/18 at 21:00;  Stop 5/7/18 at 19:22;  Status DC


Vitamin D (Vitamin D3) 50,000 unit WEEKLY PO  Last administered on 5/15/18at 08:

16;  Start 5/8/18 at 09:00


Amitriptyline HCl (Elavil) 50 mg QHS PO  Last administered on 5/7/18at 19:46;  

Start 5/7/18 at 21:00;  Stop 5/8/18 at 19:46;  Status DC


Risperidone (RisperDAL) 0.5 mg QHS PO  Last administered on 5/11/18at 20:23;  

Start 5/7/18 at 21:00;  Stop 5/12/18 at 20:19;  Status DC


Olanzapine (ZyPREXA ZYDIS) 5 mg PRN Q2HR  PRN PO Psychosis Last administered on 

5/14/18at 16:58;  Start 5/8/18 at 11:45


Lithium Citrate 8 meq BID PO ;  Start 5/8/18 at 21:00;  Stop 5/8/18 at 21:00;  

Status DC


Trazodone HCl (Desyrel) 50 mg PRN QHS  PRN PO INSOMNIA Last administered on 5/13 /18at 21:14;  Start 5/8/18 at 19:45


Mirtazapine (Remeron) 7.5 mg QHS PO  Last administered on 5/17/18at 19:29;  

Start 5/9/18 at 21:00


Lithium Carbonate 150 mg DAILY PO  Last administered on 5/17/18at 08:37;  Start 

5/11/18 at 09:00


Lithium Carbonate 300 mg HS PO  Last administered on 5/17/18at 19:29;  Start 5/

10/18 at 21:00


Nystatin (Nystop) 15 bob STK-MED ONCE TP  Last administered on 5/11/18at 22:17;

  Start 5/11/18 at 22:17;  Stop 5/11/18 at 22:18;  Status DC


Risperidone (RisperDAL) 0.75 mg QHS PO  Last administered on 5/15/18at 19:37;  

Start 5/12/18 at 21:00;  Stop 5/16/18 at 13:06;  Status DC


Polyethylene Glycol (miraLAX) 17 gm BID PO  Last administered on 5/17/18at 19:29

;  Start 5/14/18 at 09:00


Risperidone (RisperDAL) 1 mg QHS PO  Last administered on 5/17/18at 19:29;  

Start 5/16/18 at 21:00





Active Scripts


Active


Reported


Amitriptyline Hcl 75 Mg Tablet 75 Mg PO QHS


Oxycodone Hcl 5 Mg Capsule 5 Mg PO PRN Q6HRS PRN


Lorazepam 0.5 Mg Tablet 0.5 Mg PO TID PRN PRN


Famotidine 20 Mg Tablet 20 Mg PO BIDBFRMEAL


Haloperidol 2 Mg Tablet 0.5 Mg PO PRN TID PRN


Terazosin Hcl 10 Mg Capsule 10 Mg PO DAILY


Ondansetron Odt (Ondansetron) 4 Mg Tab.rapdis 4 Mg PO PRN Q6HRS


Milk Of Magnesia (Magnesium Hydroxide) 2,400 Mg/10 Ml Oral.susp 2,400 Mg PO PRN 

QID PRN


Loratadine 10 Mg Tablet 10 Mg PO 


Lithium Carbonate 300 Mg Capsule 300 Mg PO BID


Baclofen 10 Mg Tablet 10 Mg PO BID


Maalox Maximum Strength Susp (Mag Hydrox/Al Hydrox/Simeth) 355 Ml Oral.susp 15 

Ml PO PRN Q6HRS PRN


Finasteride 5 Mg Tablet 5 Mg PO DAILY


Colace (Docusate Sodium) 100 Mg Capsule 100 Mg PO PRN BID PRN


Senokot (Sennosides) 8.6 Mg Tablet 8.6 Mg PO PRN DAILY PRN


Senokot (Sennosides) 8.6 Mg Tablet 8.6 Mg PO BID


Bisacodyl 10 Mg Supp.rect 10 Mg RC PRN DAILY PRN


Tylenol (Acetaminophen) 325 Mg Tablet 650 Mg PO PRN Q6HRS PRN


I have reviewed the current psychotropics carefully including drug 

interactions.  Risk benefit ratio favors no change other than as noted in my 

dictated progress note.





Diagnosis:


Problems:  


(1) Anxiety disorder


(2) Bipolar affective, mixed, sev w/ psych


(3) Dementia, vascular, with delusions


(4) Mild cognitive impairment











DIANA STALLINGS MD May 17, 2018 20:13

## 2018-05-18 VITALS — DIASTOLIC BLOOD PRESSURE: 83 MMHG | SYSTOLIC BLOOD PRESSURE: 115 MMHG

## 2018-05-18 VITALS — DIASTOLIC BLOOD PRESSURE: 57 MMHG | SYSTOLIC BLOOD PRESSURE: 92 MMHG

## 2018-05-18 VITALS — DIASTOLIC BLOOD PRESSURE: 76 MMHG | SYSTOLIC BLOOD PRESSURE: 134 MMHG

## 2018-05-18 RX ADMIN — TERAZOSIN HYDROCHLORIDE SCH MG: 5 CAPSULE ORAL at 10:08

## 2018-05-18 RX ADMIN — SENNOSIDES A AND B SCH MG: 8.6 TABLET, FILM COATED ORAL at 20:33

## 2018-05-18 RX ADMIN — FAMOTIDINE SCH MG: 20 TABLET ORAL at 16:52

## 2018-05-18 RX ADMIN — LITHIUM CARBONATE SCH MG: 300 TABLET ORAL at 20:33

## 2018-05-18 RX ADMIN — LITHIUM CARBONATE SCH MG: 150 CAPSULE, GELATIN COATED ORAL at 10:09

## 2018-05-18 RX ADMIN — SENNOSIDES A AND B SCH MG: 8.6 TABLET, FILM COATED ORAL at 10:08

## 2018-05-18 RX ADMIN — POLYETHYLENE GLYCOL 3350 SCH GM: 17 POWDER, FOR SOLUTION ORAL at 10:10

## 2018-05-18 RX ADMIN — ENOXAPARIN SODIUM SCH MG: 100 INJECTION SUBCUTANEOUS at 20:34

## 2018-05-18 RX ADMIN — BACLOFEN SCH MG: 10 TABLET ORAL at 20:33

## 2018-05-18 RX ADMIN — POLYETHYLENE GLYCOL 3350 SCH GM: 17 POWDER, FOR SOLUTION ORAL at 20:33

## 2018-05-18 RX ADMIN — BACLOFEN SCH MG: 10 TABLET ORAL at 10:09

## 2018-05-18 RX ADMIN — BACITRACIN ZINC, NEOMYCIN, POLYMYXIN B SCH PKT: 400; 3.5; 5 OINTMENT TOPICAL at 19:30

## 2018-05-18 RX ADMIN — RISPERIDONE SCH MG: 1 TABLET, FILM COATED ORAL at 20:31

## 2018-05-18 RX ADMIN — FAMOTIDINE SCH MG: 20 TABLET ORAL at 10:09

## 2018-05-18 RX ADMIN — FINASTERIDE SCH MG: 5 TABLET, FILM COATED ORAL at 10:09

## 2018-05-18 RX ADMIN — MIRTAZAPINE SCH MG: 7.5 TABLET, FILM COATED ORAL at 20:33

## 2018-05-18 RX ADMIN — CETIRIZINE HYDROCHLORIDE SCH MG: 10 TABLET, FILM COATED ORAL at 10:09

## 2018-05-19 VITALS — SYSTOLIC BLOOD PRESSURE: 115 MMHG | DIASTOLIC BLOOD PRESSURE: 71 MMHG

## 2018-05-19 VITALS — DIASTOLIC BLOOD PRESSURE: 59 MMHG | SYSTOLIC BLOOD PRESSURE: 140 MMHG

## 2018-05-19 LAB
% BANDS: 1 % (ref 0–9)
% LYMPHS: 16 % (ref 24–48)
% MONOS: 6 % (ref 0–10)
% SEGS: 77 % (ref 35–66)
ALBUMIN SERPL-MCNC: 2.5 G/DL (ref 3.4–5)
ALBUMIN/GLOB SERPL: 0.6 {RATIO} (ref 1–1.7)
ALP SERPL-CCNC: 91 U/L (ref 46–116)
ALT SERPL-CCNC: 21 U/L (ref 16–63)
ANION GAP SERPL CALC-SCNC: 7 MMOL/L (ref 6–14)
AST SERPL-CCNC: 18 U/L (ref 15–37)
BASOPHILS # BLD AUTO: 0.1 X10^3/UL (ref 0–0.2)
BASOPHILS NFR BLD: 1 % (ref 0–3)
BILIRUB SERPL-MCNC: 0.7 MG/DL (ref 0.2–1)
BUN/CREAT SERPL: 16 (ref 6–20)
CA-I SERPL ISE-MCNC: 21 MG/DL (ref 8–26)
CALCIUM SERPL-MCNC: 9.4 MG/DL (ref 8.5–10.1)
CHLORIDE SERPL-SCNC: 104 MMOL/L (ref 98–107)
CO2 SERPL-SCNC: 27 MMOL/L (ref 21–32)
CREAT SERPL-MCNC: 1.3 MG/DL (ref 0.7–1.3)
EOSINOPHIL NFR BLD: 0.1 X10^3/UL (ref 0–0.7)
EOSINOPHIL NFR BLD: 1 % (ref 0–3)
ERYTHROCYTE [DISTWIDTH] IN BLOOD BY AUTOMATED COUNT: 15.7 % (ref 11.5–14.5)
GFR SERPLBLD BASED ON 1.73 SQ M-ARVRAT: 54.1 ML/MIN
GLOBULIN SER-MCNC: 4.3 G/DL (ref 2.2–3.8)
GLUCOSE SERPL-MCNC: 98 MG/DL (ref 70–99)
HCT VFR BLD CALC: 37 % (ref 39–53)
HGB BLD-MCNC: 12.2 G/DL (ref 13–17.5)
LYMPHOCYTES # BLD: 2.4 X10^3/UL (ref 1–4.8)
LYMPHOCYTES NFR BLD AUTO: 19 % (ref 24–48)
MCH RBC QN AUTO: 30 PG (ref 25–35)
MCHC RBC AUTO-ENTMCNC: 33 G/DL (ref 31–37)
MCV RBC AUTO: 90 FL (ref 79–100)
MONO #: 1 X10^3/UL (ref 0–1.1)
MONOCYTES NFR BLD: 8 % (ref 0–9)
NEUT #: 9.2 X10^3UL (ref 1.8–7.7)
NEUTROPHILS NFR BLD AUTO: 72 % (ref 31–73)
PLATELET # BLD AUTO: 237 X10^3/UL (ref 140–400)
PLATELET # BLD EST: ADEQUATE 10*3/UL
POTASSIUM SERPL-SCNC: 3.9 MMOL/L (ref 3.5–5.1)
PROT SERPL-MCNC: 6.8 G/DL (ref 6.4–8.2)
RBC # BLD AUTO: 4.11 X10^6/UL (ref 4.3–5.7)
SODIUM SERPL-SCNC: 138 MMOL/L (ref 136–145)
TOXIC GRANULES BLD QL SMEAR: SLIGHT
WBC # BLD AUTO: 12.7 X10^3/UL (ref 4–11)

## 2018-05-19 RX ADMIN — ENOXAPARIN SODIUM SCH MG: 100 INJECTION SUBCUTANEOUS at 20:25

## 2018-05-19 RX ADMIN — FAMOTIDINE SCH MG: 20 TABLET ORAL at 17:13

## 2018-05-19 RX ADMIN — SENNOSIDES A AND B SCH MG: 8.6 TABLET, FILM COATED ORAL at 20:22

## 2018-05-19 RX ADMIN — POLYETHYLENE GLYCOL 3350 SCH GM: 17 POWDER, FOR SOLUTION ORAL at 08:58

## 2018-05-19 RX ADMIN — TRAZODONE HYDROCHLORIDE PRN MG: 50 TABLET, FILM COATED ORAL at 01:44

## 2018-05-19 RX ADMIN — RISPERIDONE SCH MG: 1 TABLET, FILM COATED ORAL at 20:22

## 2018-05-19 RX ADMIN — CETIRIZINE HYDROCHLORIDE SCH MG: 10 TABLET, FILM COATED ORAL at 08:59

## 2018-05-19 RX ADMIN — POLYETHYLENE GLYCOL 3350 SCH GM: 17 POWDER, FOR SOLUTION ORAL at 20:22

## 2018-05-19 RX ADMIN — FINASTERIDE SCH MG: 5 TABLET, FILM COATED ORAL at 08:58

## 2018-05-19 RX ADMIN — BACLOFEN SCH MG: 10 TABLET ORAL at 08:58

## 2018-05-19 RX ADMIN — SENNOSIDES A AND B SCH MG: 8.6 TABLET, FILM COATED ORAL at 08:59

## 2018-05-19 RX ADMIN — FAMOTIDINE SCH MG: 20 TABLET ORAL at 08:57

## 2018-05-19 RX ADMIN — LITHIUM CARBONATE SCH MG: 300 TABLET ORAL at 20:22

## 2018-05-19 RX ADMIN — BACLOFEN SCH MG: 10 TABLET ORAL at 20:21

## 2018-05-19 RX ADMIN — LITHIUM CARBONATE SCH MG: 150 CAPSULE, GELATIN COATED ORAL at 08:57

## 2018-05-19 RX ADMIN — MIRTAZAPINE SCH MG: 7.5 TABLET, FILM COATED ORAL at 20:22

## 2018-05-19 RX ADMIN — TERAZOSIN HYDROCHLORIDE SCH MG: 5 CAPSULE ORAL at 08:58

## 2018-05-19 RX ADMIN — BACITRACIN ZINC, NEOMYCIN, POLYMYXIN B SCH PKT: 400; 3.5; 5 OINTMENT TOPICAL at 16:15

## 2018-05-19 NOTE — PN
DATE:  05/17/2018



This is a late entry, 05/17/2018, covers the elements not covered in my initial

note, 05/17/2018.



SUBJECTIVE:  I met with the patient in the evening.  The patient slept 6-1/2

hours, mildly agitated in the morning, having pain, received Roxicodone.  He has

been psychotic, hallucinating, believes there is someone behind him.  "He is

going to hit me."  He ate some dinner.



REVIEW OF SYSTEMS:  Ambulation impaired, in Broda chair.  No CV, , pulmonary,

eye, ENT system symptoms on review.



MENTAL STATUS EXAM:  Oriented to himself and situation.  Speech, moderate

latency, coherent.  Abstraction fair, computation impaired, language function

intact.  Mood and affect remains labile.



LABORATORY DATA:  Reviewed.



IMPRESSION:  Unchanged from initial note.



PLAN:  Continue current psychotropics and Risperdal was increased.  Lithium is

therapeutic.  Consider electroconvulsive therapy if all else fail.





______________________________

DIANA STALLINGS MD



DR:  [email or web address]  JOB#:  [id number] / [id number]

DD:  05/18/2018 16:42  DT:  05/19/2018 02:27

## 2018-05-19 NOTE — PDOC
Exam


Note:


Shai Note:


Please also refer to the separate dictated note~for this date of service 

dictated separately.~Patient seen individually. Discussed the patient with 

Nursing staff reviewed the chart.~Reviewed interim history and current 

functioning. Reviewed vital signs,~Labs/ Radiology~and current medications 

noted below. Continue current treatment with the changes noted in the dictated 

addendum note





Assessment:


Vital Signs:





 Vital Signs








  Date Time  Temp Pulse Resp B/P (MAP) Pulse Ox O2 Delivery O2 Flow Rate FiO2


 


5/19/18 16:33 97.2 72 20 140/59 (86) 95   


 


5/18/18 16:50      Room Air  








I&O











Intake and Output 


 


 5/19/18





 07:00


 


Intake Total 240 ml


 


Output Total 1600 ml


 


Balance -1360 ml


 


 


 


Intake Oral 240 ml


 


Output Urine Total 1600 ml








Labs:





 Laboratory Tests








Test


 5/19/18


07:30


 


White Blood Count


 12.7 x10^3/uL


(4.0-11.0)  H


 


Red Blood Count


 4.11 x10^6/uL


(4.30-5.70)  L


 


Hemoglobin


 12.2 g/dL


(13.0-17.5)  L


 


Hematocrit


 37.0 %


(39.0-53.0)  L


 


Mean Corpuscular Volume


 90 fL ()





 


Mean Corpuscular Hemoglobin 30 pg (25-35)  


 


Mean Corpuscular Hemoglobin


Concent 33 g/dL


(31-37)


 


Red Cell Distribution Width


 15.7 %


(11.5-14.5)  H


 


Platelet Count


 237 x10^3/uL


(140-400)


 


Neutrophils (%) (Auto) 72 % (31-73)  


 


Lymphocytes (%) (Auto) 19 % (24-48)  L


 


Monocytes (%) (Auto) 8 % (0-9)  


 


Eosinophils (%) (Auto) 1 % (0-3)  


 


Basophils (%) (Auto) 1 % (0-3)  


 


Neutrophils # (Auto)


 9.2 x10^3uL


(1.8-7.7)  H


 


Lymphocytes # (Auto)


 2.4 x10^3/uL


(1.0-4.8)


 


Monocytes # (Auto)


 1.0 x10^3/uL


(0.0-1.1)


 


Eosinophils # (Auto)


 0.1 x10^3/uL


(0.0-0.7)


 


Basophils # (Auto)


 0.1 x10^3/uL


(0.0-0.2)


 


Segmented Neutrophils % 77 % (35-66)  H


 


Band Neutrophils % 1 % (0-9)  


 


Lymphocytes % 16 % (24-48)  L


 


Monocytes % 6 % (0-10)  


 


Toxic Granulation Slight  


 


Platelet Estimate


 Adequate


(ADEQUATE)


 


Large Platelets Occ  


 


Sodium Level


 138 mmol/L


(136-145)


 


Potassium Level


 3.9 mmol/L


(3.5-5.1)


 


Chloride Level


 104 mmol/L


()


 


Carbon Dioxide Level


 27 mmol/L


(21-32)


 


Anion Gap 7 (6-14)  


 


Blood Urea Nitrogen


 21 mg/dL


(8-26)


 


Creatinine


 1.3 mg/dL


(0.7-1.3)


 


Estimated GFR


(Cockcroft-Gault) 54.1  





 


BUN/Creatinine Ratio 16 (6-20)  


 


Glucose Level


 98 mg/dL


(70-99)


 


Calcium Level


 9.4 mg/dL


(8.5-10.1)


 


Total Bilirubin


 0.7 mg/dL


(0.2-1.0)


 


Aspartate Amino Transferase


(AST) 18 U/L (15-37)





 


Alanine Aminotransferase (ALT)


 21 U/L (16-63)





 


Alkaline Phosphatase


 91 U/L


()


 


Total Protein


 6.8 g/dL


(6.4-8.2)


 


Albumin


 2.5 g/dL


(3.4-5.0)  L


 


Albumin/Globulin Ratio


 0.6 (1.0-1.7)


L











Current Medications:


Meds:





Current Medications


Multi-Ingredient Ointment (Analgesic Balm) 1 bob PRN QID  PRN TP MUSCLE PAIN;  

Start 5/5/18 at 15:00


Acetaminophen (Tylenol) 650 mg PRN Q6HRS  PRN PO PAIN / TEMP Last administered 

on 5/11/18at 10:18;  Start 5/5/18 at 15:30


Baclofen (Lioresal) 10 mg BID PO  Last administered on 5/19/18at 20:21;  Start 5 /5/18 at 21:00


Bisacodyl (Dulcolax Supp) 10 mg PRN DAILY  PRN RC CONSTIPATION Last 

administered on 5/13/18at 05:47;  Start 5/5/18 at 15:30


Docusate Sodium (Colace) 100 mg PRN BID  PRN PO CONSTIPATION;  Start 5/5/18 at 

15:30


Famotidine (Pepcid) 20 mg BIDBFRMEAL PO  Last administered on 5/19/18at 17:13;  

Start 5/5/18 at 16:30


Finasteride (Proscar) 5 mg DAILY PO  Last administered on 5/19/18at 08:58;  

Start 5/6/18 at 09:00


Haloperidol (Haldol) 0.5 mg PRN TID  PRN PO AGITATION Last administered on 5/17/ 18at 13:07;  Start 5/5/18 at 15:30;  Stop 5/17/18 at 19:12;  Status DC


Lorazepam (Ativan) 0.5 mg TID PRN  PRN PO ANXIETY / AGITATION Last administered 

on 5/18/18at 07:06;  Start 5/5/18 at 15:30


Ondansetron HCl (Zofran Odt) 4 mg PRN Q6HRS  PRN PO NAUSEA/VOMITING;  Start 5/5/ 18 at 15:30


Sennosides (Senna) 8.6 mg BID PO  Last administered on 5/19/18at 20:22;  Start 5 /5/18 at 21:00


Sennosides (Senna) 8.6 mg PRN DAILY  PRN PO CONSTIPATION;  Start 5/5/18 at 15:30


Lithium Carbonate 300 mg BID PO  Last administered on 5/8/18at 11:17;  Start 5/5 /18 at 21:00;  Stop 5/8/18 at 13:52;  Status DC


Oxycodone HCl (Roxicodone) 5 mg PRN Q6HRS  PRN PO PAIN Last administered on 5/19 /18at 20:26;  Start 5/5/18 at 15:45


Terazosin HCl (Hytrin) 10 mg DAILY PO  Last administered on 5/19/18at 08:58;  

Start 5/6/18 at 09:00


Amitriptyline HCl (Elavil) 75 mg QHS PO  Last administered on 5/6/18at 20:00;  

Start 5/5/18 at 21:00;  Stop 5/7/18 at 19:22;  Status DC


Cetirizine HCl (ZyrTEC) 10 mg DAILY PO  Last administered on 5/19/18at 08:59;  

Start 5/6/18 at 09:00


Olanzapine (ZyPREXA ZYDIS) 2.5 mg PRN Q2HR  PRN PO Psychosis;  Start 5/5/18 at 

15:45;  Stop 5/8/18 at 11:33;  Status DC


Multi-Ingredient Ointment (Analgesic Balm) 1 bob PRN QID  PRN TP MUSCLE PAIN;  

Start 5/5/18 at 15:45;  Status Cancel


Al Hydroxide/Mg Hydroxide (Mylanta Plus Xs) 15 ml PRN AFTMEALHC  PRN PO 

DYSPEPSIA;  Start 5/5/18 at 15:45


Magnesium Hydroxide (Milk Of Magnesia) 2,400 mg PRN QHS  PRN PO CONSTIPATION 

Last administered on 5/8/18at 12:05;  Start 5/5/18 at 15:45


Enoxaparin Sodium (Lovenox) 40 mg Q24H SQ  Last administered on 5/5/18at 20:19;

  Start 5/5/18 at 21:00;  Stop 5/6/18 at 14:41;  Status DC


Enoxaparin Sodium (Lovenox 30mg Syringe) 30 mg Q24H SQ  Last administered on 5/ 19/18at 20:25;  Start 5/6/18 at 21:00


Risperidone (RisperDAL) 0.25 mg QHS PO  Last administered on 5/6/18at 20:00;  

Start 5/6/18 at 21:00;  Stop 5/7/18 at 19:22;  Status DC


Vitamin D (Vitamin D3) 50,000 unit WEEKLY PO  Last administered on 5/15/18at 08:

16;  Start 5/8/18 at 09:00


Amitriptyline HCl (Elavil) 50 mg QHS PO  Last administered on 5/7/18at 19:46;  

Start 5/7/18 at 21:00;  Stop 5/8/18 at 19:46;  Status DC


Risperidone (RisperDAL) 0.5 mg QHS PO  Last administered on 5/11/18at 20:23;  

Start 5/7/18 at 21:00;  Stop 5/12/18 at 20:19;  Status DC


Olanzapine (ZyPREXA ZYDIS) 5 mg PRN Q2HR  PRN PO Psychosis Last administered on 

5/19/18at 01:44;  Start 5/8/18 at 11:45


Lithium Citrate 8 meq BID PO ;  Start 5/8/18 at 21:00;  Stop 5/8/18 at 21:00;  

Status DC


Trazodone HCl (Desyrel) 50 mg PRN QHS  PRN PO INSOMNIA Last administered on 5/19 /18at 01:44;  Start 5/8/18 at 19:45


Mirtazapine (Remeron) 7.5 mg QHS PO  Last administered on 5/19/18at 20:22;  

Start 5/9/18 at 21:00


Lithium Carbonate 150 mg DAILY PO  Last administered on 5/19/18at 08:57;  Start 

5/11/18 at 09:00


Lithium Carbonate 300 mg HS PO  Last administered on 5/19/18at 20:22;  Start 5/

10/18 at 21:00


Nystatin (Nystop) 15 bob STK-MED ONCE TP  Last administered on 5/11/18at 22:17;

  Start 5/11/18 at 22:17;  Stop 5/11/18 at 22:18;  Status DC


Risperidone (RisperDAL) 0.75 mg QHS PO  Last administered on 5/15/18at 19:37;  

Start 5/12/18 at 21:00;  Stop 5/16/18 at 13:06;  Status DC


Polyethylene Glycol (miraLAX) 17 gm BID PO  Last administered on 5/19/18at 20:22

;  Start 5/14/18 at 09:00


Risperidone (RisperDAL) 1 mg QHS PO  Last administered on 5/17/18at 19:29;  

Start 5/16/18 at 21:00;  Stop 5/18/18 at 20:05;  Status DC


Neomycin/ Polymyxin/ Bacitracin (Triple Antibiotic Ointment) 1 pkt DAILY TP  

Last administered on 5/19/18at 16:15;  Start 5/18/18 at 19:30


Risperidone (RisperDAL) 1.5 mg QHS PO  Last administered on 5/19/18at 20:22;  

Start 5/18/18 at 21:00





Active Scripts


Active


Reported


Amitriptyline Hcl 75 Mg Tablet 75 Mg PO QHS


Oxycodone Hcl 5 Mg Capsule 5 Mg PO PRN Q6HRS PRN


Lorazepam 0.5 Mg Tablet 0.5 Mg PO TID PRN PRN


Famotidine 20 Mg Tablet 20 Mg PO BIDBFRMEAL


Haloperidol 2 Mg Tablet 0.5 Mg PO PRN TID PRN


Terazosin Hcl 10 Mg Capsule 10 Mg PO DAILY


Ondansetron Odt (Ondansetron) 4 Mg Tab.rapdis 4 Mg PO PRN Q6HRS


Milk Of Magnesia (Magnesium Hydroxide) 2,400 Mg/10 Ml Oral.susp 2,400 Mg PO PRN 

QID PRN


Loratadine 10 Mg Tablet 10 Mg PO 


Lithium Carbonate 300 Mg Capsule 300 Mg PO BID


Baclofen 10 Mg Tablet 10 Mg PO BID


Maalox Maximum Strength Susp (Mag Hydrox/Al Hydrox/Simeth) 355 Ml Oral.susp 15 

Ml PO PRN Q6HRS PRN


Finasteride 5 Mg Tablet 5 Mg PO DAILY


Colace (Docusate Sodium) 100 Mg Capsule 100 Mg PO PRN BID PRN


Senokot (Sennosides) 8.6 Mg Tablet 8.6 Mg PO PRN DAILY PRN


Senokot (Sennosides) 8.6 Mg Tablet 8.6 Mg PO BID


Bisacodyl 10 Mg Supp.rect 10 Mg RC PRN DAILY PRN


Tylenol (Acetaminophen) 325 Mg Tablet 650 Mg PO PRN Q6HRS PRN


I have reviewed the current psychotropics carefully including drug 

interactions.  Risk benefit ratio favors no change other than as noted in my 

dictated progress note.





Diagnosis:


Problems:  


(1) Anxiety disorder


(2) Bipolar affective, mixed, sev w/ psych


(3) Dementia, vascular, with delusions


(4) Mild cognitive impairment











DIANA STALLINGS MD May 19, 2018 23:11

## 2018-05-19 NOTE — PDOC
Exam


Note:


Shai Note:


Late entry for date of service May 18, 2018. Please also refer to the separate 

dictated note~for this date of service dictated separately.~Patient seen 

individually. Discussed the patient with Nursing staff reviewed the chart.~

Reviewed interim history and current functioning. Reviewed vital signs,~Labs/ 

Radiology~and current medications noted below. Continue current treatment with 

the changes noted in the dictated addendum note





Assessment:


Vital Signs:





 VS - Last 72 Hours, by Label








  Date Time  Temp Pulse Resp B/P (MAP) Pulse Ox O2 Delivery O2 Flow Rate FiO2


 


5/19/18 16:33 97.2 72 20 140/59 (86) 95   


 


5/19/18 08:58  91  115/71    


 


5/19/18 06:05 98.8 91 20 115/71 (86) 98   


 


5/19/18 01:45   18     


 


5/18/18 23:20   20     


 


5/18/18 16:50     96 Room Air  


 


5/18/18 16:17 98.4 78 16 134/76 (95) 96   


 


5/18/18 13:19   18  96 Room Air  


 


5/18/18 10:08  78  115/83    


 


5/18/18 07:07   18  96 Room Air  


 


5/18/18 05:36 97.3 78 22 115/83 (94)  Room Air  


 


5/18/18 01:54 98.4 73 20 92/57 (69) 96 Room Air  


 


5/17/18 16:39 97.2 69 16 112/62 (79) 96 Room Air  


 


5/17/18 16:36 97.2 69 16 112/62 (79) 96 Room Air  


 


5/17/18 13:40   18  97 Room Air  


 


5/17/18 09:00  74  99/58    


 


5/17/18 08:37   18  97 Room Air  


 


5/17/18 05:56 97.2 74 18 99/58 (72) 97   








 Vital Signs








  Date Time  Temp Pulse Resp B/P (MAP) Pulse Ox O2 Delivery O2 Flow Rate FiO2


 


5/19/18 16:33 97.2 72 20 140/59 (86) 95   


 


5/18/18 16:50      Room Air  








I&O











Intake and Output 


 


 5/19/18





 07:00


 


Intake Total 240 ml


 


Output Total 1600 ml


 


Balance -1360 ml


 


 


 


Intake Oral 240 ml


 


Output Urine Total 1600 ml








Labs:





 Laboratory Tests








Test


 5/19/18


07:30


 


White Blood Count


 12.7 x10^3/uL


(4.0-11.0)  H


 


Red Blood Count


 4.11 x10^6/uL


(4.30-5.70)  L


 


Hemoglobin


 12.2 g/dL


(13.0-17.5)  L


 


Hematocrit


 37.0 %


(39.0-53.0)  L


 


Mean Corpuscular Volume


 90 fL ()





 


Mean Corpuscular Hemoglobin 30 pg (25-35)  


 


Mean Corpuscular Hemoglobin


Concent 33 g/dL


(31-37)


 


Red Cell Distribution Width


 15.7 %


(11.5-14.5)  H


 


Platelet Count


 237 x10^3/uL


(140-400)


 


Neutrophils (%) (Auto) 72 % (31-73)  


 


Lymphocytes (%) (Auto) 19 % (24-48)  L


 


Monocytes (%) (Auto) 8 % (0-9)  


 


Eosinophils (%) (Auto) 1 % (0-3)  


 


Basophils (%) (Auto) 1 % (0-3)  


 


Neutrophils # (Auto)


 9.2 x10^3uL


(1.8-7.7)  H


 


Lymphocytes # (Auto)


 2.4 x10^3/uL


(1.0-4.8)


 


Monocytes # (Auto)


 1.0 x10^3/uL


(0.0-1.1)


 


Eosinophils # (Auto)


 0.1 x10^3/uL


(0.0-0.7)


 


Basophils # (Auto)


 0.1 x10^3/uL


(0.0-0.2)


 


Segmented Neutrophils % 77 % (35-66)  H


 


Band Neutrophils % 1 % (0-9)  


 


Lymphocytes % 16 % (24-48)  L


 


Monocytes % 6 % (0-10)  


 


Toxic Granulation Slight  


 


Platelet Estimate


 Adequate


(ADEQUATE)


 


Large Platelets Occ  


 


Sodium Level


 138 mmol/L


(136-145)


 


Potassium Level


 3.9 mmol/L


(3.5-5.1)


 


Chloride Level


 104 mmol/L


()


 


Carbon Dioxide Level


 27 mmol/L


(21-32)


 


Anion Gap 7 (6-14)  


 


Blood Urea Nitrogen


 21 mg/dL


(8-26)


 


Creatinine


 1.3 mg/dL


(0.7-1.3)


 


Estimated GFR


(Cockcroft-Gault) 54.1  





 


BUN/Creatinine Ratio 16 (6-20)  


 


Glucose Level


 98 mg/dL


(70-99)


 


Calcium Level


 9.4 mg/dL


(8.5-10.1)


 


Total Bilirubin


 0.7 mg/dL


(0.2-1.0)


 


Aspartate Amino Transferase


(AST) 18 U/L (15-37)





 


Alanine Aminotransferase (ALT)


 21 U/L (16-63)





 


Alkaline Phosphatase


 91 U/L


()


 


Total Protein


 6.8 g/dL


(6.4-8.2)


 


Albumin


 2.5 g/dL


(3.4-5.0)  L


 


Albumin/Globulin Ratio


 0.6 (1.0-1.7)


L











Current Medications:


Meds:





Current Medications


Multi-Ingredient Ointment (Analgesic Balm) 1 bob PRN QID  PRN TP MUSCLE PAIN;  

Start 5/5/18 at 15:00


Acetaminophen (Tylenol) 650 mg PRN Q6HRS  PRN PO PAIN / TEMP Last administered 

on 5/11/18at 10:18;  Start 5/5/18 at 15:30


Baclofen (Lioresal) 10 mg BID PO  Last administered on 5/19/18at 20:21;  Start 5 /5/18 at 21:00


Bisacodyl (Dulcolax Supp) 10 mg PRN DAILY  PRN RC CONSTIPATION Last 

administered on 5/13/18at 05:47;  Start 5/5/18 at 15:30


Docusate Sodium (Colace) 100 mg PRN BID  PRN PO CONSTIPATION;  Start 5/5/18 at 

15:30


Famotidine (Pepcid) 20 mg BIDBFRMEAL PO  Last administered on 5/19/18at 17:13;  

Start 5/5/18 at 16:30


Finasteride (Proscar) 5 mg DAILY PO  Last administered on 5/19/18at 08:58;  

Start 5/6/18 at 09:00


Haloperidol (Haldol) 0.5 mg PRN TID  PRN PO AGITATION Last administered on 5/17/ 18at 13:07;  Start 5/5/18 at 15:30;  Stop 5/17/18 at 19:12;  Status DC


Lorazepam (Ativan) 0.5 mg TID PRN  PRN PO ANXIETY / AGITATION Last administered 

on 5/18/18at 07:06;  Start 5/5/18 at 15:30


Ondansetron HCl (Zofran Odt) 4 mg PRN Q6HRS  PRN PO NAUSEA/VOMITING;  Start 5/5/ 18 at 15:30


Sennosides (Senna) 8.6 mg BID PO  Last administered on 5/19/18at 20:22;  Start 5 /5/18 at 21:00


Sennosides (Senna) 8.6 mg PRN DAILY  PRN PO CONSTIPATION;  Start 5/5/18 at 15:30


Lithium Carbonate 300 mg BID PO  Last administered on 5/8/18at 11:17;  Start 5/5 /18 at 21:00;  Stop 5/8/18 at 13:52;  Status DC


Oxycodone HCl (Roxicodone) 5 mg PRN Q6HRS  PRN PO PAIN Last administered on 5/19 /18at 20:26;  Start 5/5/18 at 15:45


Terazosin HCl (Hytrin) 10 mg DAILY PO  Last administered on 5/19/18at 08:58;  

Start 5/6/18 at 09:00


Amitriptyline HCl (Elavil) 75 mg QHS PO  Last administered on 5/6/18at 20:00;  

Start 5/5/18 at 21:00;  Stop 5/7/18 at 19:22;  Status DC


Cetirizine HCl (ZyrTEC) 10 mg DAILY PO  Last administered on 5/19/18at 08:59;  

Start 5/6/18 at 09:00


Olanzapine (ZyPREXA ZYDIS) 2.5 mg PRN Q2HR  PRN PO Psychosis;  Start 5/5/18 at 

15:45;  Stop 5/8/18 at 11:33;  Status DC


Multi-Ingredient Ointment (Analgesic Balm) 1 bob PRN QID  PRN TP MUSCLE PAIN;  

Start 5/5/18 at 15:45;  Status Cancel


Al Hydroxide/Mg Hydroxide (Mylanta Plus Xs) 15 ml PRN AFTMEALHC  PRN PO 

DYSPEPSIA;  Start 5/5/18 at 15:45


Magnesium Hydroxide (Milk Of Magnesia) 2,400 mg PRN QHS  PRN PO CONSTIPATION 

Last administered on 5/8/18at 12:05;  Start 5/5/18 at 15:45


Enoxaparin Sodium (Lovenox) 40 mg Q24H SQ  Last administered on 5/5/18at 20:19;

  Start 5/5/18 at 21:00;  Stop 5/6/18 at 14:41;  Status DC


Enoxaparin Sodium (Lovenox 30mg Syringe) 30 mg Q24H SQ  Last administered on 5/ 19/18at 20:25;  Start 5/6/18 at 21:00


Risperidone (RisperDAL) 0.25 mg QHS PO  Last administered on 5/6/18at 20:00;  

Start 5/6/18 at 21:00;  Stop 5/7/18 at 19:22;  Status DC


Vitamin D (Vitamin D3) 50,000 unit WEEKLY PO  Last administered on 5/15/18at 08:

16;  Start 5/8/18 at 09:00


Amitriptyline HCl (Elavil) 50 mg QHS PO  Last administered on 5/7/18at 19:46;  

Start 5/7/18 at 21:00;  Stop 5/8/18 at 19:46;  Status DC


Risperidone (RisperDAL) 0.5 mg QHS PO  Last administered on 5/11/18at 20:23;  

Start 5/7/18 at 21:00;  Stop 5/12/18 at 20:19;  Status DC


Olanzapine (ZyPREXA ZYDIS) 5 mg PRN Q2HR  PRN PO Psychosis Last administered on 

5/19/18at 01:44;  Start 5/8/18 at 11:45


Lithium Citrate 8 meq BID PO ;  Start 5/8/18 at 21:00;  Stop 5/8/18 at 21:00;  

Status DC


Trazodone HCl (Desyrel) 50 mg PRN QHS  PRN PO INSOMNIA Last administered on 5/19 /18at 01:44;  Start 5/8/18 at 19:45


Mirtazapine (Remeron) 7.5 mg QHS PO  Last administered on 5/19/18at 20:22;  

Start 5/9/18 at 21:00


Lithium Carbonate 150 mg DAILY PO  Last administered on 5/19/18at 08:57;  Start 

5/11/18 at 09:00


Lithium Carbonate 300 mg HS PO  Last administered on 5/19/18at 20:22;  Start 5/

10/18 at 21:00


Nystatin (Nystop) 15 bob STK-MED ONCE TP  Last administered on 5/11/18at 22:17;

  Start 5/11/18 at 22:17;  Stop 5/11/18 at 22:18;  Status DC


Risperidone (RisperDAL) 0.75 mg QHS PO  Last administered on 5/15/18at 19:37;  

Start 5/12/18 at 21:00;  Stop 5/16/18 at 13:06;  Status DC


Polyethylene Glycol (miraLAX) 17 gm BID PO  Last administered on 5/19/18at 20:22

;  Start 5/14/18 at 09:00


Risperidone (RisperDAL) 1 mg QHS PO  Last administered on 5/17/18at 19:29;  

Start 5/16/18 at 21:00;  Stop 5/18/18 at 20:05;  Status DC


Neomycin/ Polymyxin/ Bacitracin (Triple Antibiotic Ointment) 1 pkt DAILY TP  

Last administered on 5/19/18at 16:15;  Start 5/18/18 at 19:30


Risperidone (RisperDAL) 1.5 mg QHS PO  Last administered on 5/19/18at 20:22;  

Start 5/18/18 at 21:00





Active Scripts


Active


Reported


Amitriptyline Hcl 75 Mg Tablet 75 Mg PO QHS


Oxycodone Hcl 5 Mg Capsule 5 Mg PO PRN Q6HRS PRN


Lorazepam 0.5 Mg Tablet 0.5 Mg PO TID PRN PRN


Famotidine 20 Mg Tablet 20 Mg PO BIDBFRMEAL


Haloperidol 2 Mg Tablet 0.5 Mg PO PRN TID PRN


Terazosin Hcl 10 Mg Capsule 10 Mg PO DAILY


Ondansetron Odt (Ondansetron) 4 Mg Tab.rapdis 4 Mg PO PRN Q6HRS


Milk Of Magnesia (Magnesium Hydroxide) 2,400 Mg/10 Ml Oral.susp 2,400 Mg PO PRN 

QID PRN


Loratadine 10 Mg Tablet 10 Mg PO 


Lithium Carbonate 300 Mg Capsule 300 Mg PO BID


Baclofen 10 Mg Tablet 10 Mg PO BID


Maalox Maximum Strength Susp (Mag Hydrox/Al Hydrox/Simeth) 355 Ml Oral.susp 15 

Ml PO PRN Q6HRS PRN


Finasteride 5 Mg Tablet 5 Mg PO DAILY


Colace (Docusate Sodium) 100 Mg Capsule 100 Mg PO PRN BID PRN


Senokot (Sennosides) 8.6 Mg Tablet 8.6 Mg PO PRN DAILY PRN


Senokot (Sennosides) 8.6 Mg Tablet 8.6 Mg PO BID


Bisacodyl 10 Mg Supp.rect 10 Mg RC PRN DAILY PRN


Tylenol (Acetaminophen) 325 Mg Tablet 650 Mg PO PRN Q6HRS PRN


I have reviewed the current psychotropics carefully including drug 

interactions.  Risk benefit ratio favors no change other than as noted in my 

dictated progress note.





Diagnosis:


Problems:  


(1) Anxiety disorder


(2) Bipolar affective, mixed, sev w/ psych


(3) Dementia, vascular, with delusions


(4) Mild cognitive impairment











DIANA STALLINGS MD May 19, 2018 22:38

## 2018-05-19 NOTE — PN
DATE:  05/16/2018



This is a late entry, 05/16/2018, covers the elements not covered in my initial

note, 05/16/2018.



SUBJECTIVE:  I met with the patient in the evening, staffed at treatment team

meeting with the entire team.  Morning of 05/16/2018, the patient was worse in

the morning, hallucinating, combative, angry.  He is quite labile, withdrawn. 

Appetite is poor.



REVIEW OF SYSTEMS:  Ambulation impaired, in Broda chair.  No CV, , pulmonary,

eye, ENT system symptoms on review.  Reliability poor.



MENTAL STATUS EXAM:  Oriented to himself and situation.  Speech has some

latency, at times pressured.  Abstraction fair, computation impaired, language

function intact, attention span short.  Mood and affect still somewhat labile.



LABORATORY DATA:  Reviewed.



IMPRESSION:  Bipolar 1 disorder, mixed with psychotic features; cognitive

disorder, unspecified.



PLAN:  Continue current psychotropics.  Increase Risperdal to 1 mg by mouth at

bedtime.  The patient has responded positively to electroconvulsive therapy in

the past and this may be needed again depending on how he does with the changes

in his psychotropics.





______________________________

MAN COBY STALLINGS MD



DR:  BRIDGETT/timbo  JOB#:  [id number] / [id number]

DD:  05/18/2018 16:41  DT:  05/19/2018 02:21

## 2018-05-20 ENCOUNTER — HOSPITAL ENCOUNTER (INPATIENT)
Dept: HOSPITAL 63 - 1 SOUTH | Age: 73
LOS: 4 days | Discharge: ANOTHER HOSPITAL | DRG: 539 | End: 2018-05-24
Attending: INTERNAL MEDICINE | Admitting: INTERNAL MEDICINE
Payer: MEDICARE

## 2018-05-20 VITALS — WEIGHT: 135.37 LBS | HEIGHT: 69 IN | BODY MASS INDEX: 20.05 KG/M2

## 2018-05-20 VITALS — DIASTOLIC BLOOD PRESSURE: 76 MMHG | SYSTOLIC BLOOD PRESSURE: 123 MMHG

## 2018-05-20 VITALS — SYSTOLIC BLOOD PRESSURE: 112 MMHG | DIASTOLIC BLOOD PRESSURE: 72 MMHG

## 2018-05-20 VITALS — SYSTOLIC BLOOD PRESSURE: 118 MMHG | DIASTOLIC BLOOD PRESSURE: 66 MMHG

## 2018-05-20 VITALS — DIASTOLIC BLOOD PRESSURE: 69 MMHG | SYSTOLIC BLOOD PRESSURE: 115 MMHG
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
## 2018-05-20 DIAGNOSIS — Z88.0: ICD-10-CM

## 2018-05-20 DIAGNOSIS — N40.1: ICD-10-CM

## 2018-05-20 DIAGNOSIS — F09: ICD-10-CM

## 2018-05-20 DIAGNOSIS — G30.9: ICD-10-CM

## 2018-05-20 DIAGNOSIS — N13.8: ICD-10-CM
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
## 2018-05-20 DIAGNOSIS — R13.10: ICD-10-CM

## 2018-05-20 DIAGNOSIS — N17.9: ICD-10-CM

## 2018-05-20 DIAGNOSIS — S61.217A: ICD-10-CM

## 2018-05-20 DIAGNOSIS — F31.64: ICD-10-CM

## 2018-05-20 DIAGNOSIS — F01.50: ICD-10-CM

## 2018-05-20 DIAGNOSIS — M86.8X4: Primary | ICD-10-CM

## 2018-05-20 DIAGNOSIS — L89.152: ICD-10-CM

## 2018-05-20 DIAGNOSIS — G95.9: ICD-10-CM

## 2018-05-20 DIAGNOSIS — F02.81: ICD-10-CM

## 2018-05-20 DIAGNOSIS — K59.09: ICD-10-CM

## 2018-05-20 DIAGNOSIS — S62.607A: ICD-10-CM
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
## 2018-05-20 DIAGNOSIS — G82.50: ICD-10-CM

## 2018-05-20 DIAGNOSIS — N18.9: ICD-10-CM

## 2018-05-20 DIAGNOSIS — F31.9: ICD-10-CM

## 2018-05-20 DIAGNOSIS — E03.9: ICD-10-CM

## 2018-05-20 DIAGNOSIS — F22: ICD-10-CM

## 2018-05-20 DIAGNOSIS — Z79.899: ICD-10-CM

## 2018-05-20 DIAGNOSIS — L03.012: ICD-10-CM

## 2018-05-20 DIAGNOSIS — L89.153: ICD-10-CM
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
## 2018-05-20 LAB
ALBUMIN SERPL-MCNC: 2.3 G/DL (ref 3.4–5)
ALBUMIN/GLOB SERPL: 0.5 {RATIO} (ref 1–1.7)
ALP SERPL-CCNC: 82 U/L (ref 46–116)
ALT SERPL-CCNC: 20 U/L (ref 16–63)
ANION GAP SERPL CALC-SCNC: 6 MMOL/L (ref 6–14)
AST SERPL-CCNC: 14 U/L (ref 15–37)
BASOPHILS # BLD AUTO: 0.1 X10^3/UL (ref 0–0.2)
BASOPHILS NFR BLD: 1 % (ref 0–3)
BILIRUB SERPL-MCNC: 0.5 MG/DL (ref 0.2–1)
BUN/CREAT SERPL: 21 (ref 6–20)
CA-I SERPL ISE-MCNC: 25 MG/DL (ref 8–26)
CALCIUM SERPL-MCNC: 8.8 MG/DL (ref 8.5–10.1)
CHLORIDE SERPL-SCNC: 105 MMOL/L (ref 98–107)
CO2 SERPL-SCNC: 27 MMOL/L (ref 21–32)
CREAT SERPL-MCNC: 1.2 MG/DL (ref 0.7–1.3)
EOSINOPHIL NFR BLD: 0.1 X10^3/UL (ref 0–0.7)
EOSINOPHIL NFR BLD: 1 % (ref 0–3)
ERYTHROCYTE [DISTWIDTH] IN BLOOD BY AUTOMATED COUNT: 15.6 % (ref 11.5–14.5)
GFR SERPLBLD BASED ON 1.73 SQ M-ARVRAT: 59.3 ML/MIN
GLOBULIN SER-MCNC: 4.2 G/DL (ref 2.2–3.8)
GLUCOSE SERPL-MCNC: 100 MG/DL (ref 70–99)
HCT VFR BLD CALC: 36.4 % (ref 39–53)
HGB BLD-MCNC: 12 G/DL (ref 13–17.5)
LYMPHOCYTES # BLD: 2.9 X10^3/UL (ref 1–4.8)
LYMPHOCYTES NFR BLD AUTO: 28 % (ref 24–48)
MCH RBC QN AUTO: 30 PG (ref 25–35)
MCHC RBC AUTO-ENTMCNC: 33 G/DL (ref 31–37)
MCV RBC AUTO: 91 FL (ref 79–100)
MONO #: 0.8 X10^3/UL (ref 0–1.1)
MONOCYTES NFR BLD: 8 % (ref 0–9)
NEUT #: 6.3 X10^3UL (ref 1.8–7.7)
NEUTROPHILS NFR BLD AUTO: 62 % (ref 31–73)
PLATELET # BLD AUTO: 243 X10^3/UL (ref 140–400)
POTASSIUM SERPL-SCNC: 4.1 MMOL/L (ref 3.5–5.1)
PROT SERPL-MCNC: 6.5 G/DL (ref 6.4–8.2)
RBC # BLD AUTO: 4.01 X10^6/UL (ref 4.3–5.7)
SODIUM SERPL-SCNC: 138 MMOL/L (ref 136–145)
WBC # BLD AUTO: 10.2 X10^3/UL (ref 4–11)

## 2018-05-20 PROCEDURE — 85651 RBC SED RATE NONAUTOMATED: CPT

## 2018-05-20 PROCEDURE — 85027 COMPLETE CBC AUTOMATED: CPT

## 2018-05-20 PROCEDURE — 36415 COLL VENOUS BLD VENIPUNCTURE: CPT

## 2018-05-20 PROCEDURE — 73120 X-RAY EXAM OF HAND: CPT

## 2018-05-20 PROCEDURE — 85025 COMPLETE CBC W/AUTO DIFF WBC: CPT

## 2018-05-20 PROCEDURE — 86140 C-REACTIVE PROTEIN: CPT

## 2018-05-20 PROCEDURE — 87641 MR-STAPH DNA AMP PROBE: CPT

## 2018-05-20 PROCEDURE — 80202 ASSAY OF VANCOMYCIN: CPT

## 2018-05-20 PROCEDURE — 80053 COMPREHEN METABOLIC PANEL: CPT

## 2018-05-20 RX ADMIN — CETIRIZINE HYDROCHLORIDE SCH MG: 10 TABLET, FILM COATED ORAL at 09:04

## 2018-05-20 RX ADMIN — FINASTERIDE SCH MG: 5 TABLET, FILM COATED ORAL at 09:04

## 2018-05-20 RX ADMIN — BACLOFEN SCH MG: 10 TABLET ORAL at 09:04

## 2018-05-20 RX ADMIN — MEROPENEM SCH MLS/HR: 1 INJECTION, POWDER, FOR SOLUTION INTRAVENOUS at 23:00

## 2018-05-20 RX ADMIN — LITHIUM CARBONATE SCH MG: 150 CAPSULE, GELATIN COATED ORAL at 09:03

## 2018-05-20 RX ADMIN — FAMOTIDINE SCH MG: 20 TABLET ORAL at 17:12

## 2018-05-20 RX ADMIN — MIRTAZAPINE SCH MG: 7.5 TABLET, FILM COATED ORAL at 22:01

## 2018-05-20 RX ADMIN — FAMOTIDINE SCH MG: 20 TABLET ORAL at 09:03

## 2018-05-20 RX ADMIN — TERAZOSIN HYDROCHLORIDE SCH MG: 5 CAPSULE ORAL at 09:04

## 2018-05-20 RX ADMIN — LITHIUM CARBONATE SCH MG: 300 TABLET ORAL at 22:01

## 2018-05-20 RX ADMIN — VANCOMYCIN HYDROCHLORIDE PRN EACH: 1 INJECTION, POWDER, LYOPHILIZED, FOR SOLUTION INTRAVENOUS at 22:49

## 2018-05-20 RX ADMIN — BACITRACIN ZINC, NEOMYCIN, POLYMYXIN B SCH PKT: 400; 3.5; 5 OINTMENT TOPICAL at 09:04

## 2018-05-20 RX ADMIN — RISPERIDONE SCH MG: 1 TABLET, FILM COATED ORAL at 22:01

## 2018-05-20 RX ADMIN — SODIUM CHLORIDE SCH MLS/HR: 0.9 INJECTION, SOLUTION INTRAVENOUS at 20:49

## 2018-05-20 RX ADMIN — TRAZODONE HYDROCHLORIDE PRN MG: 50 TABLET, FILM COATED ORAL at 22:01

## 2018-05-20 RX ADMIN — POLYETHYLENE GLYCOL 3350 SCH GM: 17 POWDER, FOR SOLUTION ORAL at 09:04

## 2018-05-20 RX ADMIN — SENNOSIDES A AND B SCH MG: 8.6 TABLET, FILM COATED ORAL at 09:04

## 2018-05-20 NOTE — PDOC
Exam


Note:


Shai Note:


Please also refer to the separate dictated note~for this date of service 

dictated separately.~Patient seen individually. Discussed the patient with 

Nursing staff reviewed the chart.~Reviewed interim history and current 

functioning. Reviewed vital signs,~Labs/ Radiology~and current medications 

noted below. Continue current treatment with the changes noted in the dictated 

addendum note





Assessment:


Vital Signs:





 Vital Signs








  Date Time  Temp Pulse Resp B/P (MAP) Pulse Ox O2 Delivery O2 Flow Rate FiO2


 


5/20/18 15:37 98.2 75 16 118/66 (83) 94   


 


5/18/18 16:50      Room Air  








I&O











Intake and Output 


 


 5/20/18





 07:00


 


Intake Total 1210 ml


 


Output Total 700 ml


 


Balance 510 ml


 


 


 


Intake Oral 1210 ml


 


Output Urine Total 550 ml


 


Urine/Stool Mix 150 ml











Current Medications:


Meds:





Current Medications


Multi-Ingredient Ointment (Analgesic Balm) 1 bob PRN QID  PRN TP MUSCLE PAIN;  

Start 5/5/18 at 15:00;  Stop 5/20/18 at 18:33;  Status DC


Acetaminophen (Tylenol) 650 mg PRN Q6HRS  PRN PO PAIN / TEMP Last administered 

on 5/11/18at 10:18;  Start 5/5/18 at 15:30;  Stop 5/20/18 at 18:33;  Status DC


Baclofen (Lioresal) 10 mg BID PO  Last administered on 5/20/18at 09:04;  Start 5 /5/18 at 21:00;  Stop 5/20/18 at 18:33;  Status DC


Bisacodyl (Dulcolax Supp) 10 mg PRN DAILY  PRN RC CONSTIPATION Last 

administered on 5/13/18at 05:47;  Start 5/5/18 at 15:30;  Stop 5/20/18 at 18:33

;  Status DC


Docusate Sodium (Colace) 100 mg PRN BID  PRN PO CONSTIPATION;  Start 5/5/18 at 

15:30;  Stop 5/20/18 at 18:33;  Status DC


Famotidine (Pepcid) 20 mg BIDBFRMEAL PO  Last administered on 5/20/18at 17:12;  

Start 5/5/18 at 16:30;  Stop 5/20/18 at 18:33;  Status DC


Finasteride (Proscar) 5 mg DAILY PO  Last administered on 5/20/18at 09:04;  

Start 5/6/18 at 09:00;  Stop 5/20/18 at 18:33;  Status DC


Haloperidol (Haldol) 0.5 mg PRN TID  PRN PO AGITATION Last administered on 5/17/ 18at 13:07;  Start 5/5/18 at 15:30;  Stop 5/17/18 at 19:12;  Status DC


Lorazepam (Ativan) 0.5 mg TID PRN  PRN PO ANXIETY / AGITATION Last administered 

on 5/18/18at 07:06;  Start 5/5/18 at 15:30;  Stop 5/20/18 at 18:33;  Status DC


Ondansetron HCl (Zofran Odt) 4 mg PRN Q6HRS  PRN PO NAUSEA/VOMITING;  Start 5/5/ 18 at 15:30;  Stop 5/20/18 at 18:33;  Status DC


Sennosides (Senna) 8.6 mg BID PO  Last administered on 5/20/18at 09:04;  Start 5 /5/18 at 21:00;  Stop 5/20/18 at 18:33;  Status DC


Sennosides (Senna) 8.6 mg PRN DAILY  PRN PO CONSTIPATION;  Start 5/5/18 at 15:30

;  Stop 5/20/18 at 18:33;  Status DC


Lithium Carbonate 300 mg BID PO  Last administered on 5/8/18at 11:17;  Start 5/5 /18 at 21:00;  Stop 5/8/18 at 13:52;  Status DC


Oxycodone HCl (Roxicodone) 5 mg PRN Q6HRS  PRN PO PAIN Last administered on 5/19 /18at 20:26;  Start 5/5/18 at 15:45;  Stop 5/20/18 at 18:33;  Status DC


Terazosin HCl (Hytrin) 10 mg DAILY PO  Last administered on 5/20/18at 09:04;  

Start 5/6/18 at 09:00;  Stop 5/20/18 at 18:33;  Status DC


Amitriptyline HCl (Elavil) 75 mg QHS PO  Last administered on 5/6/18at 20:00;  

Start 5/5/18 at 21:00;  Stop 5/7/18 at 19:22;  Status DC


Cetirizine HCl (ZyrTEC) 10 mg DAILY PO  Last administered on 5/20/18at 09:04;  

Start 5/6/18 at 09:00;  Stop 5/20/18 at 18:33;  Status DC


Olanzapine (ZyPREXA ZYDIS) 2.5 mg PRN Q2HR  PRN PO Psychosis;  Start 5/5/18 at 

15:45;  Stop 5/8/18 at 11:33;  Status DC


Multi-Ingredient Ointment (Analgesic Balm) 1 bob PRN QID  PRN TP MUSCLE PAIN;  

Start 5/5/18 at 15:45;  Status Cancel


Al Hydroxide/Mg Hydroxide (Mylanta Plus Xs) 15 ml PRN AFTMEALHC  PRN PO 

DYSPEPSIA;  Start 5/5/18 at 15:45;  Stop 5/20/18 at 18:33;  Status DC


Magnesium Hydroxide (Milk Of Magnesia) 2,400 mg PRN QHS  PRN PO CONSTIPATION 

Last administered on 5/8/18at 12:05;  Start 5/5/18 at 15:45;  Stop 5/20/18 at 18

:33;  Status DC


Enoxaparin Sodium (Lovenox) 40 mg Q24H SQ  Last administered on 5/5/18at 20:19;

  Start 5/5/18 at 21:00;  Stop 5/6/18 at 14:41;  Status DC


Enoxaparin Sodium (Lovenox 30mg Syringe) 30 mg Q24H SQ  Last administered on 5/ 19/18at 20:25;  Start 5/6/18 at 21:00;  Stop 5/20/18 at 18:33;  Status DC


Risperidone (RisperDAL) 0.25 mg QHS PO  Last administered on 5/6/18at 20:00;  

Start 5/6/18 at 21:00;  Stop 5/7/18 at 19:22;  Status DC


Vitamin D (Vitamin D3) 50,000 unit WEEKLY PO  Last administered on 5/15/18at 08:

16;  Start 5/8/18 at 09:00;  Stop 5/20/18 at 18:33;  Status DC


Amitriptyline HCl (Elavil) 50 mg QHS PO  Last administered on 5/7/18at 19:46;  

Start 5/7/18 at 21:00;  Stop 5/8/18 at 19:46;  Status DC


Risperidone (RisperDAL) 0.5 mg QHS PO  Last administered on 5/11/18at 20:23;  

Start 5/7/18 at 21:00;  Stop 5/12/18 at 20:19;  Status DC


Olanzapine (ZyPREXA ZYDIS) 5 mg PRN Q2HR  PRN PO Psychosis Last administered on 

5/19/18at 01:44;  Start 5/8/18 at 11:45;  Stop 5/20/18 at 18:33;  Status DC


Lithium Citrate 8 meq BID PO ;  Start 5/8/18 at 21:00;  Stop 5/8/18 at 21:00;  

Status DC


Trazodone HCl (Desyrel) 50 mg PRN QHS  PRN PO INSOMNIA Last administered on 5/19 /18at 01:44;  Start 5/8/18 at 19:45;  Stop 5/20/18 at 18:33;  Status DC


Mirtazapine (Remeron) 7.5 mg QHS PO  Last administered on 5/19/18at 20:22;  

Start 5/9/18 at 21:00;  Stop 5/20/18 at 18:33;  Status DC


Lithium Carbonate 150 mg DAILY PO  Last administered on 5/20/18at 09:03;  Start 

5/11/18 at 09:00;  Stop 5/20/18 at 18:33;  Status DC


Lithium Carbonate 300 mg HS PO  Last administered on 5/19/18at 20:22;  Start 5/

10/18 at 21:00;  Stop 5/20/18 at 18:33;  Status DC


Nystatin (Nystop) 15 bob STK-MED ONCE TP  Last administered on 5/11/18at 22:17;

  Start 5/11/18 at 22:17;  Stop 5/11/18 at 22:18;  Status DC


Risperidone (RisperDAL) 0.75 mg QHS PO  Last administered on 5/15/18at 19:37;  

Start 5/12/18 at 21:00;  Stop 5/16/18 at 13:06;  Status DC


Polyethylene Glycol (miraLAX) 17 gm BID PO  Last administered on 5/20/18at 09:04

;  Start 5/14/18 at 09:00;  Stop 5/20/18 at 18:33;  Status DC


Risperidone (RisperDAL) 1 mg QHS PO  Last administered on 5/17/18at 19:29;  

Start 5/16/18 at 21:00;  Stop 5/18/18 at 20:05;  Status DC


Neomycin/ Polymyxin/ Bacitracin (Triple Antibiotic Ointment) 1 pkt DAILY TP  

Last administered on 5/20/18at 09:04;  Start 5/18/18 at 19:30;  Stop 5/20/18 at 

18:33;  Status DC


Risperidone (RisperDAL) 1.5 mg QHS PO  Last administered on 5/19/18at 20:22;  

Start 5/18/18 at 21:00;  Stop 5/20/18 at 18:33;  Status DC





Active Scripts


Active


Reported


Trazodone Hcl 50 Mg Tablet 1 Tab PO QHS PRN


Risperdal (Risperidone) 1 Mg Tablet 1.5 Mg PO QHS


Zyprexa Zydis (Olanzapine) 5 Mg Tab.rapdis 5 Mg PO PRN Q2HR PRN


Remeron (Mirtazapine) 15 Mg Tablet 0.5 Tab PO QHS


Lithium Carbonate 150 Mg Capsule 1 Cap PO DAILY


Miralax (Polyethylene Glycol 3350) 17 Gm Powd.pack 1 Packet PO BID


Triple Antibiotic Ointment (Neomy Sulf/Bacitra/Polymyxin B) 1 Each Packet 1 

Each TP DAILY


Lovenox (Enoxaparin Sodium) 40 Mg/0.4 Ml Disp.syrin 30 Mg SQ DAILY


D3-50 (Cholecalciferol (Vitamin D3)) 50,000 Unit Capsule 1 Cap PO WEEKLY


Oxycodone Hcl 5 Mg Capsule 5 Mg PO PRN Q6HRS PRN


Lorazepam 0.5 Mg Tablet 0.5 Mg PO TID PRN PRN


Famotidine 20 Mg Tablet 20 Mg PO BIDBFRMEAL


Terazosin Hcl 10 Mg Capsule 10 Mg PO DAILY


Ondansetron Odt (Ondansetron) 4 Mg Tab.rapdis 4 Mg PO PRN Q6HRS


Milk Of Magnesia (Magnesium Hydroxide) 2,400 Mg/10 Ml Oral.susp 2,400 Mg PO PRN 

QID PRN


Loratadine 10 Mg Tablet 10 Mg PO 


Lithium Carbonate 300 Mg Capsule 300 Mg PO HS


Baclofen 10 Mg Tablet 10 Mg PO BID


Maalox Maximum Strength Susp (Mag Hydrox/Al Hydrox/Simeth) 355 Ml Oral.susp 15 

Ml PO PRN Q6HRS PRN


Finasteride 5 Mg Tablet 5 Mg PO DAILY


Colace (Docusate Sodium) 100 Mg Capsule 100 Mg PO PRN BID PRN


Senokot (Sennosides) 8.6 Mg Tablet 8.6 Mg PO PRN DAILY PRN


Senokot (Sennosides) 8.6 Mg Tablet 8.6 Mg PO BID


Bisacodyl 10 Mg Supp.rect 10 Mg RC PRN DAILY PRN


Tylenol (Acetaminophen) 325 Mg Tablet 650 Mg PO PRN Q6HRS PRN


I have reviewed the current psychotropics carefully including drug 

interactions.  Risk benefit ratio favors no change other than as noted in my 

dictated progress note.





Diagnosis:


Problems:  


(1) Mild cognitive impairment


(2) Dementia, vascular, with delusions


(3) Bipolar affective, mixed, sev w/ psych


(4) Anxiety disorder











DIANA STALLINGS MD May 20, 2018 20:10

## 2018-05-20 NOTE — NUR
The patient, DILCIA STEINBERG, 71 y/o, M admitted by SAMIR SCOTT MD, was given written 
information regarding hospital policies, unit procedures and contact persons. Patient 
admitted from the [facility] with a dx of cellulitis to the left pinky on hand. New orders noted. 
Patient is very pleasant at this time. Alert to name only. Photographs taken of wound to 
pinky and to coccyx put in chart. Patient denies pain at this time. 



Valuables were checked and left with patient.

## 2018-05-20 NOTE — NUR
Pharmacy Vancomycin Dosing Note



S:Consulted to monitor and dose vancomycin started 05/20/18.



O:DILCIA STEINBERG is a 72 year old M with 

Cellulitis .



Height: 5 feet, 9 inches

Weight: 61.868939 kg

Ideal Body Weight: 70.70 

Adjusted Body Weight: 66.94 

Dosing Weight: Actual



Other Antibiotics: 

MERREM 1GM IV Q12H



LABS:

Last BUN: 25 

Last Creatinine: 1.2 

Creatinine Clearance: 47.5 

Last WBC: 10.2 

Last Platelets: 243 

Tmax (past 24 hours): 



Microbiology: 



I/O: 



Drug Levels:

Last  level:  on  at 

Last dose given  at 



Vancomycin Dosing:

Loading Dose: 1500 mg x1  5/20/18 2051

Dosing Weight: Actual

Target Trough: 10-20





A: Based on: Actual Wt and CrCl





P: 1. 5/21/18 2100  Vancomycin 1000 mg IV q24h

   2. Follow up Trough level on 05/22/18 at 2030 

   3. Pharmacy will continue to monitor, follow and adjust therapy as needed.

 

 MARY HILTON RPH,  05/20/18 2251

-------------------------------------------------------------------------------

Signed:    05/20/18 at 2252 by MARY HILTON RPH PHA

-------------------------------------------------------------------------------

## 2018-05-20 NOTE — PN
DATE:  05/18/2018



This is a late entry of 05/18/2018 covers elements not covered in my initial

note of 05/18/2018.



SUBJECTIVE:  I met with the patient in the evening.  The patient has been quite

confused, anxious, agitated "send me to combat."  He has; otherwise, made

statements "let me die", yelling at times.  He has been agitated.  He bit his

finger extremely hard to where it was bleeding with skin tear.  He has been

talking about vampires in the evening, mitts were placed on his hands to help

prevent another recurrence of the above.  He received Ativan, Roxicodone with

some response.



REVIEW OF SYSTEMS:  Ambulation impaired, in Broda chair.  No CV, , pulmonary,

eye, ENT system symptoms on review.



MENTAL STATUS EXAM:  Oriented to himself and situation.  Speech coherent, rapid

at times.  Abstraction fair, computation impaired, language function intact,

attention span short.  Mood and affect somewhat labile.



LABORATORY DATA:  Reviewed.



IMPRESSION:  Unchanged from initial note.



PLAN:  Continue current psychotropics, but increase Risperdal from 1 mg at

bedtime to 1.5 mg at bedtime.  Adjust further as clinically indicated.  Check

CBC and CMP in the morning of 05/19/2018.





______________________________

MAN COBY STALLINGS MD



DR:  BRIDGETT/timbo  JOB#:  [id number] / [id number]

DD:  05/19/2018 22:42  DT:  05/20/2018 20:56

## 2018-05-21 VITALS — SYSTOLIC BLOOD PRESSURE: 132 MMHG | DIASTOLIC BLOOD PRESSURE: 83 MMHG

## 2018-05-21 VITALS — DIASTOLIC BLOOD PRESSURE: 68 MMHG | SYSTOLIC BLOOD PRESSURE: 128 MMHG

## 2018-05-21 VITALS — DIASTOLIC BLOOD PRESSURE: 74 MMHG | SYSTOLIC BLOOD PRESSURE: 138 MMHG

## 2018-05-21 VITALS — SYSTOLIC BLOOD PRESSURE: 113 MMHG | DIASTOLIC BLOOD PRESSURE: 64 MMHG

## 2018-05-21 VITALS — DIASTOLIC BLOOD PRESSURE: 64 MMHG | SYSTOLIC BLOOD PRESSURE: 149 MMHG

## 2018-05-21 RX ADMIN — MEROPENEM SCH MLS/HR: 1 INJECTION, POWDER, FOR SOLUTION INTRAVENOUS at 20:14

## 2018-05-21 RX ADMIN — SODIUM CHLORIDE SCH MLS/HR: 0.9 INJECTION, SOLUTION INTRAVENOUS at 13:26

## 2018-05-21 RX ADMIN — MIRTAZAPINE SCH MG: 7.5 TABLET, FILM COATED ORAL at 20:11

## 2018-05-21 RX ADMIN — FINASTERIDE SCH MG: 5 TABLET, FILM COATED ORAL at 08:39

## 2018-05-21 RX ADMIN — RISPERIDONE SCH MG: 1 TABLET, FILM COATED ORAL at 20:11

## 2018-05-21 RX ADMIN — SENNOSIDES A AND B SCH MG: 8.6 TABLET, FILM COATED ORAL at 08:40

## 2018-05-21 RX ADMIN — SENNOSIDES A AND B SCH MG: 8.6 TABLET, FILM COATED ORAL at 20:11

## 2018-05-21 RX ADMIN — BACLOFEN SCH MG: 10 TABLET ORAL at 20:13

## 2018-05-21 RX ADMIN — TRAZODONE HYDROCHLORIDE PRN MG: 50 TABLET, FILM COATED ORAL at 20:11

## 2018-05-21 RX ADMIN — BACITRACIN ZINC, NEOMYCIN, POLYMYXIN B SCH APP: 400; 3.5; 5 OINTMENT TOPICAL at 08:41

## 2018-05-21 RX ADMIN — POLYETHYLENE GLYCOL 3350 SCH GM: 17 POWDER, FOR SOLUTION ORAL at 20:12

## 2018-05-21 RX ADMIN — LITHIUM CARBONATE SCH MG: 150 CAPSULE, GELATIN COATED ORAL at 08:37

## 2018-05-21 RX ADMIN — VANCOMYCIN HYDROCHLORIDE PRN EACH: 1 INJECTION, POWDER, LYOPHILIZED, FOR SOLUTION INTRAVENOUS at 11:34

## 2018-05-21 RX ADMIN — TERAZOSIN HYDROCHLORIDE SCH MG: 5 CAPSULE ORAL at 08:39

## 2018-05-21 RX ADMIN — Medication SCH CAP: at 20:11

## 2018-05-21 RX ADMIN — Medication SCH CAP: at 13:26

## 2018-05-21 RX ADMIN — POLYETHYLENE GLYCOL 3350 SCH GM: 17 POWDER, FOR SOLUTION ORAL at 08:39

## 2018-05-21 RX ADMIN — BACLOFEN SCH MG: 10 TABLET ORAL at 08:39

## 2018-05-21 RX ADMIN — MEROPENEM SCH MLS/HR: 1 INJECTION, POWDER, FOR SOLUTION INTRAVENOUS at 08:29

## 2018-05-21 RX ADMIN — CETIRIZINE HYDROCHLORIDE SCH MG: 10 TABLET, FILM COATED ORAL at 08:40

## 2018-05-21 RX ADMIN — NYSTATIN SCH APP: 100000 POWDER TOPICAL at 08:41

## 2018-05-21 RX ADMIN — FAMOTIDINE SCH MG: 20 TABLET ORAL at 20:11

## 2018-05-21 RX ADMIN — FAMOTIDINE SCH MG: 20 TABLET ORAL at 08:39

## 2018-05-21 RX ADMIN — VANCOMYCIN HYDROCHLORIDE SCH MLS/HR: 1 INJECTION, POWDER, LYOPHILIZED, FOR SOLUTION INTRAVENOUS at 21:13

## 2018-05-21 RX ADMIN — LITHIUM CARBONATE SCH MG: 300 TABLET ORAL at 20:10

## 2018-05-21 RX ADMIN — NYSTATIN SCH APP: 100000 POWDER TOPICAL at 20:10

## 2018-05-21 NOTE — NUR
wound care

patient seen per wound care consult. see wound assessment.  patient has IAD, maceration and 
stage 2 pressure ulcer with DTI on the coccyx area the area was cleaned, measured and 
redressed with HARIS, recommendations of continuing with HARIS or Calazime cream, prn.  patient 
needs to be turning every 2 hours and was turned to the left side at this time.  patient 
also has a wound to the left 5th finger, from notes in the chart patient bit his left 5th 
finger, the wound was cleaned, measured and with measurement the depth feels as if bone or 
tendon may be exposed, patient has some purulent drainage with cleaning, recommendations of 
Aquacel Ag with a Telfa dressing, change every other day.  will order patient a P500 bed at 
this time.  wound care will continue to f/u for possible changes.

## 2018-05-21 NOTE — RAD
History:  Cellulitis, osteomyelitis.

 

Comparison:  None.

 

Findings:

 

PA, lateral, and oblique views of the left hand.  Evaluation is 

significantly limited as there is flexion of the 2nd through 5th digits.  

IV catheter is seen involving the dorsal wrist.

 

There is an obliquely oriented fracture involving the shaft of the 5th 

distal phalanx.  On the lateral view, there is evidence of pencil in cup 

deformity involving one of the DIP joints, although it is difficult to 

assess exactly the joint; this may be the 3rd DIP joint.

 

Impression:

1.  Limited examination.

2.  Obliquely oriented fracture involving the 5th distal phalanx, age 

indeterminate.

3.  Apparent pencil in cup deformity of the DIP joint, possibly 3rd DIP 

joint.  Pencil in cup appearance can be seen in the setting of psoriatic 

or rheumatoid arthritis, Bhumi's syndrome, or systemic sclerosis.  

Additional possibility could be septic arthritis.

 

Electronically signed by: Joshua Zimmer MD (5/21/2018 4:29 PM) [facility]

## 2018-05-21 NOTE — NUR
Nursing:



P500 Specialty bed ordered per protocol for wound healing. Order confirmation placed on 
paper chart. Per nursing sup, bed will arrive tomorrow morning. 



Will continue to turn Q2 hours and position for maximal comfort. Bed alarmed and in lowest 
position for safety.
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
## 2018-05-21 NOTE — PN
DATE:  05/19/2018



PSYCHIATRIC PROGRESS NOTE



This is a late entry 05/19/2018 covers elements not covered in my initial note

05/19/2018.



SUBJECTIVE:  I met with the patient in the evening.  Overall, the patient slept

3 hours previous evening, little more awake, oriented, laughing with nursing

staff, which is an improvement.  Appetite slightly better.  He was joking with

nursing staff and called me "how are you doing bro."  Staffs were quite amused

with this and certainly it is remarkable that he appears with a lighter heart

and some sense of humor as opposed to how he has been doing in the past few

days.



REVIEW OF SYSTEMS:  Ambulation impaired, in Broda chair.  No CV, , pulmonary,

eye system symptoms on review.  Reliability poor.



MENTAL STATUS EXAM:  Oriented to himself and situations at times.  Speech has

some latency, coherent, often responses monosyllabic.  Abstraction fair,

computation impaired, language function intact.  Mood and affect somewhat

withdrawn, despite the above, improvement.



LABORATORY DATA:  Reviewed.



IMPRESSION:  Unchanged from initial note.



PLAN:  Continue current psychotropics including lithium, Risperdal, Remeron,

Ativan p.r.n.





______________________________

MAN COBY STALLINGS MD



DR:  BRIDGETT/timbo  JOB#:  [id number] / [id number]

DD:  05/20/2018 16:41  DT:  05/21/2018 09:54

## 2018-05-21 NOTE — HP
ADMIT DATE:  05/20/2018



HISTORY OF PRESENT ILLNESS:  The patient is a 72-year-old  male patient

who was seen yesterday at [facility].  He has bitten his tongue,

however, has bitten his left fifth finger and sustained laceration on the medial

aspect of the tip of his finger, did not require any suturing, but redness and

swelling has dramatically worsened.  A decision was made to transfer him down to

[address] [place] [address] to start him on IV antibiotic for possible cellulitis and/or underlying

osteomyelitis.  The patient was originally admitted to [facility] Emergency Room from [facility] on the account of screaming for 45

minutes that the ghost is trying to get into bed with him.  The patient

apparently has been increasingly confused, disoriented, seems to be during the

course to keep quiet.  When staffs were attempting to talk to him, Haldol and

subsequently Ativan tried and failed, he was referred to the Emergency Room,

found to be extremely psychotic, confused within the context, diagnosed with

bipolar disorder with worsening mood swings and referred for inpatient

psychiatric stabilization.  While there, he apparently bitten his left fifth

finger, and subsequently, there is worsening redness and swelling, and given

that he has dysphagia and his intake of oral antibiotic are unreliable, a

decision was made to transfer him down to [address] [place] [address] to start him on IV antibiotic.



PAST MEDICAL HISTORY:  Significant for hypothyroidism, benign prostatic

hypertrophy, chronic constipation, cervical myelopathy with quadriplegia as well

as neurogenic bladder requiring indwelling Gordon catheter.  He has also acute on

chronic kidney injury because of bladder outlet obstruction.



PAST PSYCHIATRIC HISTORY:  Significant for bipolar disorder.  There is racing

thoughts alternating with depression, has significant insomnia and worsening

mood swings, worsening confusion.  His behavior apparently has been deemed

dangerous, unmanageable, resulting in his referral to the Emergency Room and

then to [facility] where he was from there transferred to [address] [place] [address].



FAMILY HISTORY:  Noncontributory.



SOCIAL HISTORY:  Resident at [facility].  Retired from [employer].  He

apparently has been at [facility] for a while.  He does not smoke, drink

alcohol or use any recreational drugs.



MEDICATIONS:  He is currently on following medications:  He is on loratadine 10

mg once a day, baclofen 10 mg twice a day, Lovenox 30 mg subcutaneously once a

day, terazosin 10 mg daily, oxycodone 5 mg every 6 hours, acetaminophen 650 mg

every 6 hours.  He is on mirtazapine 7.5 mg once a day, trazodone 50 mg at

bedtime, olanzapine 5 mg for Zyprexa Zydis every 2 hours, risperidone 1.5 mg at

bedtime.  He is on lorazepam 0.5 mg 3 times a day, lithium carbonate 300 mg at

bedtime, lithium carbonate 150 mg daily, Maalox 15 mL every 6 hours, bisacodyl

10 mg rectally daily p.r.n. for constipation, Colace 100 mg twice a day, milk of

magnesia 30 mL p.o. daily p.r.n. for constipation, polyethylene glycol 17 grams

daily p.r.n. for constipation, senna 1 tablet twice a day, ondansetron 4 mg

every 6 hours as needed, famotidine 20 mg twice a day.  Neomycin, triple

antibiotic ointment applied topically daily.  Vitamin D for cholecalciferol

50,000 units weekly and finasteride 5 mg once a day.



PHYSICAL EXAMINATION:

GENERAL:  On examining him, he was extremely pale, cachectic, but no jaundice,

cyanosis, or thyromegaly.  No jugular venous distension.  No limb edema.

VITAL SIGNS: His heart rate was 75, blood pressure was 118/66, temperature was

98.2, respiratory rate was 16 and oxygen saturation was 94%.

HEAD, EYES, EARS, NOSE AND THROAT:  Showed normocephalic, atraumatic.

NECK:  Supple.

HEART:  Showed normal first and second heart sounds with no gallop, rub or

murmur.

CHEST:  Clear to auscultation.  No crepitation or rhonchi.

ABDOMEN:  Scaphoid, soft, nontender.  No guarding or rigidity.  No organomegaly.

 All hernial orifices intact.  Bowel sounds normal.

NEUROLOGIC:  He was awake, alert, but very confused.  All his cranial nerves are

intact.  He has quadriplegia with marked muscle wasting and fixed flexion

contraction of both upper and lower extremities.

Skin:  He has marked swelling and erythema of the left fifth finger.



LABORATORY DATA:  On admission showed a white cell count of 10,000, hemoglobin

12, hematocrit 36, MCV 91, and platelet count 243,000.  His serum sodium is 138,

potassium 4.1, chloride 105, bicarbonate 27, anion gap of 6, BUN 25, creatinine

1.2, estimated GFR was 59 mL per minute.  His glucose was 100, calcium was 8.8. 

Total bilirubin, AST, ALT, alkaline phosphatase were normal.  Total protein was

6.5, albumin was 2.3.



ASSESSMENT AND PLAN:  In summary, this is a 72-year-old  male patient

who apparently bit his left fifth finger, sustaining laceration.  The wound

become infected and developed a cellulitis and questionable osteomyelitis of his

left fifth finger.  We will continue with IV antibiotic in the form of

vancomycin and meropenem as he is allergic to PENICILLIN.  Continued all his

other medication.  Follow his labs.  I will arrange for an x-ray of his left

hand and follow his labs on a daily basis, decide on further management

accordingly.  Would also consult Dr. Di[place]





______________________________

SAMIR SCOTT MD



DR:  SHAWN/timbo  JOB#:  [id number] / [id number]

DD:  05/21/2018 15:00  DT:  05/21/2018 18:09

## 2018-05-22 VITALS — SYSTOLIC BLOOD PRESSURE: 125 MMHG | DIASTOLIC BLOOD PRESSURE: 74 MMHG

## 2018-05-22 VITALS — SYSTOLIC BLOOD PRESSURE: 129 MMHG | DIASTOLIC BLOOD PRESSURE: 65 MMHG

## 2018-05-22 VITALS — SYSTOLIC BLOOD PRESSURE: 111 MMHG | DIASTOLIC BLOOD PRESSURE: 68 MMHG

## 2018-05-22 VITALS — DIASTOLIC BLOOD PRESSURE: 69 MMHG | SYSTOLIC BLOOD PRESSURE: 125 MMHG

## 2018-05-22 LAB
ALBUMIN SERPL-MCNC: 2 G/DL (ref 3.4–5)
ALBUMIN/GLOB SERPL: 0.5 {RATIO} (ref 1–1.7)
ALP SERPL-CCNC: 70 U/L (ref 46–116)
ALT SERPL-CCNC: 18 U/L (ref 16–63)
ANION GAP SERPL CALC-SCNC: 7 MMOL/L (ref 6–14)
AST SERPL-CCNC: 11 U/L (ref 15–37)
BILIRUB SERPL-MCNC: 0.4 MG/DL (ref 0.2–1)
BUN/CREAT SERPL: 20 (ref 6–20)
CA-I SERPL ISE-MCNC: 24 MG/DL (ref 8–26)
CALCIUM SERPL-MCNC: 8.2 MG/DL (ref 8.5–10.1)
CHLORIDE SERPL-SCNC: 111 MMOL/L (ref 98–107)
CO2 SERPL-SCNC: 26 MMOL/L (ref 21–32)
CREAT SERPL-MCNC: 1.2 MG/DL (ref 0.7–1.3)
CRP SERPL-MCNC: 11.2 MG/L (ref 0–3.3)
ERYTHROCYTE [DISTWIDTH] IN BLOOD BY AUTOMATED COUNT: 15.6 % (ref 11.5–14.5)
ERYTHROCYTE [SEDIMENTATION RATE] IN BLOOD: 35 MM/H (ref 0–15)
GFR SERPLBLD BASED ON 1.73 SQ M-ARVRAT: 59.3 ML/MIN
GLOBULIN SER-MCNC: 3.8 G/DL (ref 2.2–3.8)
GLUCOSE SERPL-MCNC: 94 MG/DL (ref 70–99)
HCT VFR BLD CALC: 34.1 % (ref 39–53)
HGB BLD-MCNC: 11.2 G/DL (ref 13–17.5)
MCH RBC QN AUTO: 30 PG (ref 25–35)
MCHC RBC AUTO-ENTMCNC: 33 G/DL (ref 31–37)
MCV RBC AUTO: 91 FL (ref 79–100)
PLATELET # BLD AUTO: 221 X10^3/UL (ref 140–400)
POTASSIUM SERPL-SCNC: 3.9 MMOL/L (ref 3.5–5.1)
PROT SERPL-MCNC: 5.8 G/DL (ref 6.4–8.2)
RBC # BLD AUTO: 3.76 X10^6/UL (ref 4.3–5.7)
SODIUM SERPL-SCNC: 144 MMOL/L (ref 136–145)
VANC TR: 12.4 MCG/ML (ref 10–20)
WBC # BLD AUTO: 7.5 X10^3/UL (ref 4–11)

## 2018-05-22 RX ADMIN — NYSTATIN SCH APP: 100000 POWDER TOPICAL at 10:32

## 2018-05-22 RX ADMIN — MEROPENEM SCH MLS/HR: 1 INJECTION, POWDER, FOR SOLUTION INTRAVENOUS at 10:31

## 2018-05-22 RX ADMIN — FINASTERIDE SCH MG: 5 TABLET, FILM COATED ORAL at 10:11

## 2018-05-22 RX ADMIN — TERAZOSIN HYDROCHLORIDE SCH MG: 5 CAPSULE ORAL at 10:11

## 2018-05-22 RX ADMIN — BACLOFEN SCH MG: 10 TABLET ORAL at 21:28

## 2018-05-22 RX ADMIN — MEROPENEM SCH MLS/HR: 1 INJECTION, POWDER, FOR SOLUTION INTRAVENOUS at 21:27

## 2018-05-22 RX ADMIN — Medication SCH CAP: at 21:28

## 2018-05-22 RX ADMIN — LITHIUM CARBONATE SCH MG: 150 CAPSULE, GELATIN COATED ORAL at 10:10

## 2018-05-22 RX ADMIN — BACLOFEN SCH MG: 10 TABLET ORAL at 10:11

## 2018-05-22 RX ADMIN — SODIUM CHLORIDE SCH MLS/HR: 0.9 INJECTION, SOLUTION INTRAVENOUS at 13:00

## 2018-05-22 RX ADMIN — SENNOSIDES A AND B SCH MG: 8.6 TABLET, FILM COATED ORAL at 10:12

## 2018-05-22 RX ADMIN — VANCOMYCIN HYDROCHLORIDE PRN EACH: 1 INJECTION, POWDER, LYOPHILIZED, FOR SOLUTION INTRAVENOUS at 21:52

## 2018-05-22 RX ADMIN — Medication SCH CAP: at 10:10

## 2018-05-22 RX ADMIN — LITHIUM CARBONATE SCH MG: 300 TABLET ORAL at 21:29

## 2018-05-22 RX ADMIN — FAMOTIDINE SCH MG: 20 TABLET ORAL at 10:11

## 2018-05-22 RX ADMIN — BACITRACIN ZINC, NEOMYCIN, POLYMYXIN B SCH APP: 400; 3.5; 5 OINTMENT TOPICAL at 09:00

## 2018-05-22 RX ADMIN — NYSTATIN SCH APP: 100000 POWDER TOPICAL at 21:00

## 2018-05-22 RX ADMIN — POLYETHYLENE GLYCOL 3350 SCH GM: 17 POWDER, FOR SOLUTION ORAL at 21:00

## 2018-05-22 RX ADMIN — CETIRIZINE HYDROCHLORIDE SCH MG: 10 TABLET, FILM COATED ORAL at 10:12

## 2018-05-22 RX ADMIN — FAMOTIDINE SCH MG: 20 TABLET ORAL at 21:28

## 2018-05-22 RX ADMIN — ENOXAPARIN SODIUM SCH MG: 100 INJECTION SUBCUTANEOUS at 10:07

## 2018-05-22 RX ADMIN — VANCOMYCIN HYDROCHLORIDE SCH MLS/HR: 1 INJECTION, POWDER, LYOPHILIZED, FOR SOLUTION INTRAVENOUS at 22:37

## 2018-05-22 RX ADMIN — RISPERIDONE SCH MG: 1 TABLET, FILM COATED ORAL at 21:28

## 2018-05-22 RX ADMIN — SODIUM CHLORIDE SCH MLS/HR: 0.9 INJECTION, SOLUTION INTRAVENOUS at 03:41

## 2018-05-22 RX ADMIN — SENNOSIDES A AND B SCH MG: 8.6 TABLET, FILM COATED ORAL at 21:27

## 2018-05-22 RX ADMIN — POLYETHYLENE GLYCOL 3350 SCH GM: 17 POWDER, FOR SOLUTION ORAL at 10:11

## 2018-05-22 RX ADMIN — MIRTAZAPINE SCH MG: 7.5 TABLET, FILM COATED ORAL at 21:27

## 2018-05-22 NOTE — NUR
Pharmacy Vancomycin Dosing Note



S:Consulted to monitor and dose vancomycin started 05/20/18.



O:DILCIA STEINBERG is a 72 year old M with 

Cellulitis .



Height: 5 feet, 9 inches

Weight: 61.439889 kg

Ideal Body Weight: 70.70 

Adjusted Body Weight: 66.98 

Dosing Weight: Actual



Other Antibiotics: 

MERREM 1GM IV Q12H



LABS:

Last BUN: 24 

Last Creatinine: 1.2 

Creatinine Clearance: 47.5 

Last WBC: 7.5 

Last Platelets: 221  



Drug Levels:

Last Trough level: 12.4  on 05/22/18 at 2030 

Last dose given 05/22/18 at 2100 



Vancomycin Dosing:

Loading Dose: 1500 mg x1

Dosing Weight: Actual

Target Trough: 10-20





A: Based on: Trough and renal function





P: 1. Continue Vancomycin 1000 mg IV q24h

   2. Follow up Trough level on 05/25/18 at 2030 

   3. Pharmacy will continue to monitor, follow and adjust therapy as needed.

 

 YUE GOLDMAN,  05/22/18 1939

## 2018-05-22 NOTE — PN
DATE:  



SUBJECTIVE:  The patient is resting flat, comfortably in bed and is in no

apparent distress.  On questioning him, he denied any complaint.  Nursing staff

stated that he continued to have obvious redness, swelling of his left fifth

finger, multiple wounds in the knuckles of his other fingers.  He has also stage

2 sacrococcygeal decubitus ulcer.  He has an indwelling Gordon catheter.



PHYSICAL EXAMINATION:

GENERAL:  When I examined him, he looked pale, cachectic, no jaundice, cyanosis,

or thyromegaly.  No jugular venous distension.  No limb edema.

VITAL SIGNS:  His heart rate was 71, blood pressure was 113/64, temperature was

98.1, respiratory rate 20, and oxygen saturation was 98%.

HEAD, EYES, EARS, NOSE AND THROAT:  Normocephalic, atraumatic.

NECK:  Supple.

HEART:  Showed normal first and second heart sounds with no gallop, rub or

murmur.

CHEST:  Clear to auscultation.  No crepitation or rhonchi.

ABDOMEN:  Scaphoid, soft, nontender.

NEUROLOGIC:  He is awake, alert, confused.  All his cranial nerves are intact. 

He has quadriplegia with marked muscle wasting and fixed flexion contraction,

particular of his upper extremities and fingers, both hands.  He has marked

swelling and erythema of his left fifth finger with a wound on the palmar aspect

of the distal end of his left fifth finger.  Multiple scabbing wounds on the

knuckles of all the other fingers of both hands, likely from self-inflicted

trauma to these fingers, although probably some edema is not intentional.  He

has an indwelling Gordon catheter, has stage II sacrococcygeal decubitus ulcer.



His intake over the last 24 hours was 710, output was 1300.



LABORATORY DATA:  No lab work was done today.



ASSESSMENT:  Cellulitis, questionable osteomyelitis of his left fifth finger. 

To continue with IV antibiotic in the form of vancomycin and meropenem as he is

allergic to penicillin.  Other medical problems include bipolar disorder. To

continue with all his other medications.  Continue with finasteride for his

enlarged prostate.  Continue with pain management.  I will repeat all his labs

tomorrow and also arrange for him to have an x-ray of the left fifth finger, and

we might have to do a bone scan or a CT scan of the hand.





______________________________

SAMIR SCOTT MD



DR:  Niles  JOB#:  [id number] / [id number]

DD:  05/21/2018 15:07  DT:  05/22/2018 03:47

## 2018-05-22 NOTE — PN
DATE:  05/22/2018



SUBJECTIVE:  The patient is resting flat in his low air loss mattress, in no

apparent distress.  On questioning him, he denied any complaint.  His left fifth

finger continued to be red and swollen and the x-ray of his hand showed that he

has an obliquely oriented fracture involving the fifth distal phalanx, age

indeterminate.  His apparent pencil-in-cup deformity of the distal

interphalangeal joint, possibly third, this appearance was seen in the setting

of psoriatic or rheumatoid arthritis, Bhumi syndrome, systemic sclerosis,

addition of _____ could be septic arthritis.



PHYSICAL EXAMINATION:

GENERAL:  When I saw him today, he was resting slightly propped up in bed, in no

apparent respiratory distress.  He was pale, but no jaundice, cyanosis or

thyromegaly.  No jugular venous distension.  No lower limb edema.

VITAL SIGNS:  His heart rate was 91, blood pressure 129/65, temperature was

97.6, respiratory rate 20, and oxygen saturation was 97% on room air.

HEAD, EYES, EARS, NOSE AND THROAT:  Showed normocephalic, atraumatic.

NECK:  Supple.

HEART:  Showed normal first and second heart sounds.  No gallop, rub or murmur.

CHEST:  Clear to auscultation.  No crepitation or rhonchi.

ABDOMEN:  Distended, soft.  No guarding or rigidity.  No organomegaly.  Hernial

orifice intact.  Bowel sounds normal.

NEUROLOGIC:  He is awake, alert, responding appropriately.  His cranial nerves

are intact.  He has quadriplegia with marked muscle wasting bone of all 4 limbs.

 He has an indwelling Gordon catheter.  His left fifth finger is markedly swollen

and erythematous.  He was seen by the wound care nurse and he apparently was

diagnosed with a stage 2 pressure ulcer with deep tissue injury on the coccyx

area.



LABORATORY DATA:  His lab work today showed a white cell count of 7500,

hemoglobin 11, hematocrit 34, MCV 91, and platelet count of 221,000.  His

chemistry showed that his serum sodium was 144, potassium 3.9, chloride 111,

bicarbonate 26, anion gap of 7, BUN 24, creatinine 1.2, estimated GFR was 59 mL

per minute.  His glucose was 94, calcium was 8.2.  Total bilirubin, AST, ALT,

alkaline phosphatase were normal.  C-reactive protein is high at 11.2.  Total

protein was 5.8, albumin 2.  His sedimentation rate was 35 mm per hour.



ASSESSMENT:

1.  Left fifth finger fracture, possible cellulitis, and underlying

osteomyelitis according to the x-ray.

2.  Stage II sacral decubitus ulcer.

3.  Cervical myelopathy with quadriplegia.

4.  Other medical problems include hypothyroidism, benign prostatic hypertrophy

with bladder outlet obstruction, chronic constipation, chronic kidney injury has

resolved.  His creatinine is down to 1.2 from 2, when he was upstairs.



PLAN:  To continue with the intravenous antibiotic.  Continue with deep venous

thrombosis prophylaxis.  Continue with pain management and all other medication.

 I will contact the orthopedic surgeon to see whether amputation of his fifth

finger might be necessary.





______________________________

SAMIR SCOTT MD



DR:  SHAWN/timbo  JOB#:  [id number] / [id number]

DD:  05/22/2018 11:43  DT:  05/22/2018 21:20

## 2018-05-23 VITALS — SYSTOLIC BLOOD PRESSURE: 144 MMHG | DIASTOLIC BLOOD PRESSURE: 94 MMHG

## 2018-05-23 VITALS — DIASTOLIC BLOOD PRESSURE: 68 MMHG | SYSTOLIC BLOOD PRESSURE: 125 MMHG

## 2018-05-23 VITALS — DIASTOLIC BLOOD PRESSURE: 66 MMHG | SYSTOLIC BLOOD PRESSURE: 119 MMHG

## 2018-05-23 VITALS — SYSTOLIC BLOOD PRESSURE: 154 MMHG | DIASTOLIC BLOOD PRESSURE: 84 MMHG

## 2018-05-23 VITALS — SYSTOLIC BLOOD PRESSURE: 111 MMHG | DIASTOLIC BLOOD PRESSURE: 67 MMHG

## 2018-05-23 VITALS — SYSTOLIC BLOOD PRESSURE: 122 MMHG | DIASTOLIC BLOOD PRESSURE: 66 MMHG

## 2018-05-23 RX ADMIN — FAMOTIDINE SCH MG: 20 TABLET ORAL at 08:15

## 2018-05-23 RX ADMIN — SODIUM CHLORIDE SCH MLS/HR: 0.9 INJECTION, SOLUTION INTRAVENOUS at 14:45

## 2018-05-23 RX ADMIN — ENOXAPARIN SODIUM SCH MG: 100 INJECTION SUBCUTANEOUS at 08:17

## 2018-05-23 RX ADMIN — BACITRACIN ZINC, NEOMYCIN, POLYMYXIN B SCH APP: 400; 3.5; 5 OINTMENT TOPICAL at 08:25

## 2018-05-23 RX ADMIN — SENNOSIDES A AND B SCH MG: 8.6 TABLET, FILM COATED ORAL at 08:15

## 2018-05-23 RX ADMIN — POLYETHYLENE GLYCOL 3350 SCH GM: 17 POWDER, FOR SOLUTION ORAL at 21:38

## 2018-05-23 RX ADMIN — FINASTERIDE SCH MG: 5 TABLET, FILM COATED ORAL at 08:16

## 2018-05-23 RX ADMIN — MEROPENEM SCH MLS/HR: 1 INJECTION, POWDER, FOR SOLUTION INTRAVENOUS at 21:12

## 2018-05-23 RX ADMIN — MIRTAZAPINE SCH MG: 7.5 TABLET, FILM COATED ORAL at 21:38

## 2018-05-23 RX ADMIN — CETIRIZINE HYDROCHLORIDE SCH MG: 10 TABLET, FILM COATED ORAL at 08:15

## 2018-05-23 RX ADMIN — LITHIUM CARBONATE SCH MG: 150 CAPSULE, GELATIN COATED ORAL at 08:15

## 2018-05-23 RX ADMIN — OXYCODONE HYDROCHLORIDE AND ACETAMINOPHEN SCH MG: 500 TABLET ORAL at 08:15

## 2018-05-23 RX ADMIN — RISPERIDONE SCH MG: 1 TABLET, FILM COATED ORAL at 21:38

## 2018-05-23 RX ADMIN — Medication SCH CAP: at 08:15

## 2018-05-23 RX ADMIN — Medication SCH CAP: at 21:38

## 2018-05-23 RX ADMIN — NYSTATIN SCH APP: 100000 POWDER TOPICAL at 08:25

## 2018-05-23 RX ADMIN — FAMOTIDINE SCH MG: 20 TABLET ORAL at 21:38

## 2018-05-23 RX ADMIN — BACLOFEN SCH MG: 10 TABLET ORAL at 08:15

## 2018-05-23 RX ADMIN — LITHIUM CARBONATE SCH MG: 300 TABLET ORAL at 21:00

## 2018-05-23 RX ADMIN — POLYETHYLENE GLYCOL 3350 SCH GM: 17 POWDER, FOR SOLUTION ORAL at 08:17

## 2018-05-23 RX ADMIN — VANCOMYCIN HYDROCHLORIDE PRN EACH: 1 INJECTION, POWDER, LYOPHILIZED, FOR SOLUTION INTRAVENOUS at 10:58

## 2018-05-23 RX ADMIN — MULTIPLE VITAMINS W/ MINERALS TAB SCH TAB: TAB at 08:15

## 2018-05-23 RX ADMIN — BACLOFEN SCH MG: 10 TABLET ORAL at 21:38

## 2018-05-23 RX ADMIN — VANCOMYCIN HYDROCHLORIDE SCH MLS/HR: 1 INJECTION, POWDER, LYOPHILIZED, FOR SOLUTION INTRAVENOUS at 22:33

## 2018-05-23 RX ADMIN — TERAZOSIN HYDROCHLORIDE SCH MG: 5 CAPSULE ORAL at 08:16

## 2018-05-23 RX ADMIN — SENNOSIDES A AND B SCH MG: 8.6 TABLET, FILM COATED ORAL at 21:38

## 2018-05-23 RX ADMIN — MEROPENEM SCH MLS/HR: 1 INJECTION, POWDER, FOR SOLUTION INTRAVENOUS at 08:17

## 2018-05-23 RX ADMIN — SODIUM CHLORIDE SCH MLS/HR: 0.9 INJECTION, SOLUTION INTRAVENOUS at 01:51

## 2018-05-23 RX ADMIN — NYSTATIN SCH APP: 100000 POWDER TOPICAL at 21:00

## 2018-05-23 NOTE — NUR
IP: patient tests + for MRSA nasal screen, requires contact precautions until 2 negative 
results 7 days apart.

## 2018-05-23 NOTE — NUR
Pt pleasant but confused this am. Appetite poor, adequate fluid intake. Pt bathed and 
applied Nystatin powder to groin are and HARIS to coccyx. No bleeding noted. Pt had large BM. 
Will continue to monitor.

## 2018-05-24 VITALS — DIASTOLIC BLOOD PRESSURE: 69 MMHG | SYSTOLIC BLOOD PRESSURE: 133 MMHG

## 2018-05-24 VITALS — DIASTOLIC BLOOD PRESSURE: 69 MMHG | SYSTOLIC BLOOD PRESSURE: 116 MMHG

## 2018-05-24 LAB
ALBUMIN SERPL-MCNC: 2.2 G/DL (ref 3.4–5)
ALBUMIN/GLOB SERPL: 0.5 {RATIO} (ref 1–1.7)
ALP SERPL-CCNC: 79 U/L (ref 46–116)
ALT SERPL-CCNC: 18 U/L (ref 16–63)
ANION GAP SERPL CALC-SCNC: 7 MMOL/L (ref 6–14)
AST SERPL-CCNC: 13 U/L (ref 15–37)
BILIRUB SERPL-MCNC: 0.4 MG/DL (ref 0.2–1)
BUN/CREAT SERPL: 18 (ref 6–20)
CA-I SERPL ISE-MCNC: 18 MG/DL (ref 8–26)
CALCIUM SERPL-MCNC: 8.5 MG/DL (ref 8.5–10.1)
CHLORIDE SERPL-SCNC: 111 MMOL/L (ref 98–107)
CO2 SERPL-SCNC: 27 MMOL/L (ref 21–32)
CREAT SERPL-MCNC: 1 MG/DL (ref 0.7–1.3)
ERYTHROCYTE [DISTWIDTH] IN BLOOD BY AUTOMATED COUNT: 15.9 % (ref 11.5–14.5)
GFR SERPLBLD BASED ON 1.73 SQ M-ARVRAT: 73.2 ML/MIN
GLOBULIN SER-MCNC: 4.1 G/DL (ref 2.2–3.8)
GLUCOSE SERPL-MCNC: 99 MG/DL (ref 70–99)
HCT VFR BLD CALC: 37 % (ref 39–53)
HGB BLD-MCNC: 11.9 G/DL (ref 13–17.5)
MCH RBC QN AUTO: 29 PG (ref 25–35)
MCHC RBC AUTO-ENTMCNC: 32 G/DL (ref 31–37)
MCV RBC AUTO: 91 FL (ref 79–100)
PLATELET # BLD AUTO: 228 X10^3/UL (ref 140–400)
POTASSIUM SERPL-SCNC: 4.3 MMOL/L (ref 3.5–5.1)
PROT SERPL-MCNC: 6.3 G/DL (ref 6.4–8.2)
RBC # BLD AUTO: 4.05 X10^6/UL (ref 4.3–5.7)
SODIUM SERPL-SCNC: 145 MMOL/L (ref 136–145)
WBC # BLD AUTO: 8.2 X10^3/UL (ref 4–11)

## 2018-05-24 RX ADMIN — LITHIUM CARBONATE SCH MG: 150 CAPSULE, GELATIN COATED ORAL at 08:45

## 2018-05-24 RX ADMIN — Medication SCH CAP: at 08:43

## 2018-05-24 RX ADMIN — BACITRACIN ZINC, NEOMYCIN, POLYMYXIN B SCH APP: 400; 3.5; 5 OINTMENT TOPICAL at 10:00

## 2018-05-24 RX ADMIN — FINASTERIDE SCH MG: 5 TABLET, FILM COATED ORAL at 08:42

## 2018-05-24 RX ADMIN — MULTIPLE VITAMINS W/ MINERALS TAB SCH TAB: TAB at 08:41

## 2018-05-24 RX ADMIN — SODIUM CHLORIDE SCH MLS/HR: 0.9 INJECTION, SOLUTION INTRAVENOUS at 05:03

## 2018-05-24 RX ADMIN — FAMOTIDINE SCH MG: 20 TABLET ORAL at 08:42

## 2018-05-24 RX ADMIN — MEROPENEM SCH MLS/HR: 1 INJECTION, POWDER, FOR SOLUTION INTRAVENOUS at 08:33

## 2018-05-24 RX ADMIN — ENOXAPARIN SODIUM SCH MG: 100 INJECTION SUBCUTANEOUS at 08:41

## 2018-05-24 RX ADMIN — NYSTATIN SCH APP: 100000 POWDER TOPICAL at 08:46

## 2018-05-24 RX ADMIN — OXYCODONE HYDROCHLORIDE AND ACETAMINOPHEN SCH MG: 500 TABLET ORAL at 08:41

## 2018-05-24 RX ADMIN — SENNOSIDES A AND B SCH MG: 8.6 TABLET, FILM COATED ORAL at 08:42

## 2018-05-24 RX ADMIN — TERAZOSIN HYDROCHLORIDE SCH MG: 5 CAPSULE ORAL at 08:43

## 2018-05-24 RX ADMIN — CETIRIZINE HYDROCHLORIDE SCH MG: 10 TABLET, FILM COATED ORAL at 08:44

## 2018-05-24 RX ADMIN — POLYETHYLENE GLYCOL 3350 SCH GM: 17 POWDER, FOR SOLUTION ORAL at 08:46

## 2018-05-24 RX ADMIN — BACLOFEN SCH MG: 10 TABLET ORAL at 08:42

## 2018-05-24 NOTE — DS
DATE OF DISCHARGE:  05/24/2018



HOSPITAL COURSE:  The patient is a 72-year-old  male patient who was

transferred [facility] on account of infected left fifth finger

that he has bitten.  His distal phalanx was found to be broken, but

nondisplaced.  He has wounds at the palmar aspect of the left fifth finger.  We

did start him on IV antibiotics in the form of vancomycin and meropenem as he is

allergic to PENICILLIN, and I did consult Dr. Holden yesterday and he looked at

the x-ray and explained the clinical finding and he recommended that amputation

is probably the best treatment for his infected broken left fifth finger, and

therefore, we transferred him today to [facility].



PHYSICAL EXAMINATION:

GENERAL:  On examining him today, he looked well and was clearly in no apparent

respiratory distress.  He was pale, cachectic, but no jaundiced or cyanosed.  No

lymphadenopathy, no thyromegaly.  No jugular venous distention.  No lower limb

edema.

VITAL SIGNS:  His heart rate was 62, blood pressure was 133/69, temperature was

98.3, respiratory rate was 14, and oxygen saturation was 96%.

HEAD, EYES, EARS, NOSE AND THROAT:  Showed normocephalic, atraumatic.

NECK:  Supple.

HEART:  Normal first and second heart sounds with no gallop, rub, or murmur.

CHEST:  Clear to auscultation.  No crepitation or rhonchi.

ABDOMEN:  Scaphoid, soft, nontender.

NEUROLOGIC:  He is confused, but without any obvious lateralizing sign.  He has

quadriplegia with marked muscle wasting and fixed flexion contracture.  He has

claw hands with multiple scabbed wounds in the knuckles of the fingers of both

hands.  His left fifth finger is markedly swollen and erythematous.



LABORATORY DATA:  The x-ray showed distal phalanx as broken obliquely.  His

intake over the last 24 hours was 1200, output was 3600.



His lab work this morning showed a white cell count of 8200, hemoglobin 12,

hematocrit 37, MCV 91, and platelet count 228,000.  His sedimentation rate was

35 mm per hour.  His chemistry as of this morning showed a serum sodium of 145,

potassium 4.3, chloride 111, bicarbonate 27, anion gap of 7, BUN of 18,

creatinine 1.  Estimated GFR was 73 mL per minute.  His glucose was 99.  Calcium

was 8.5.  Total bilirubin, AST, ALT, and alkaline phosphatase were normal.  His

C-reactive protein was 11.2 mg/dL, total protein was 6.3, albumin 2.2.



DISCHARGE MEDICATIONS:  He will be discharged and transferred to [facility] to continue on vitamin D 50,000 units once a week, vancomycin 1

gram IV once a day, meropenem 1 g IV q.8 hourly, multivitamin 1 tablet once a

day, ascorbic acid 250 mg once a day.  We will hold his Lovenox in case the

orthopedic surgeons plan to amputate the finger tomorrow.  Continue with

lactobacillus rhamnosus 1 capsule twice a day, nystatin powder applied topically

twice a day, terazosin 10 mg daily, senna 1 tablet twice a day, polyethylene

glycol 17 g twice a day, lithium carbonate 150 mg p.o. daily, finasteride 5 mg

daily, famotidine 20 mg p.o. b.i.d., baclofen 10 mg b.i.d., cetirizine 10 mg

once a day, triple antibiotic applied topically, lithium carbonate 300 mg at

bedtime, trazodone 50 mg at bedtime, risperidone 1.5 mg at bedtime, mirtazapine

7.5 mg at bedtime, oxycodone 5 mg every 6 hours, magnesium hydroxide or milk of

magnesia 30 mL p.o. daily p.r.n. for constipation, Colace 100 mg twice a day,

ondansetron 4 mg every 4 hours as needed, olanzapine 5 mg every 2 hours, and

Tylenol 650 mg every 6 hours.



FINAL DISCHARGE/TRANSFER DIAGNOSES:

1.  Left fifth finger fracture with cellulitis and underlying osteomyelitis.

2.  Stage 3 sacral decubitus ulcers.

3.  Cervical myelopathy with quadriplegia.

4.  Other medical problems include:

A.  Hypothyroidism.

B.  Benign prostatic hypertrophy with bladder outlet obstruction requiring

indwelling Gordon catheter

C.  Chronic constipation.

D.  Acute-on-chronic kidney injury, resolving.

E.  Bipolar disorder, mixed with psychotic feature; cognitive disorder,

unspecified versus major neurocognitive disorder; early Alzheimer, vascular with

delusion.





______________________________

SAMIR SCOTT MD



DR:  SHAWN/timbo  JOB#:  [id number] / [id number]

DD:  05/24/2018 12:30  DT:  05/24/2018 14:25

## 2018-05-24 NOTE — PN
DATE:  05/23/2018



SUBJECTIVE:  The patient is resting slightly propped up in bed, in no apparent

distress.  Apparently, he has been less agitated.  His left fifth finger

continued to be red and swollen, although not painful.  His x-ray showed that he

has obliquely oriented fracture involving the fifth distal phalanx, age

indeterminate and apparent pencil-in-cup deformity of the distal interphalangeal

joint, possibly third distal interphalangeal joint.  This abnormality can be

seen in the setting of psoriatic rheumatoid arthritis, Bhumi syndrome with

systemic sclerosis and additional possibility could be septic arthritis.



PHYSICAL EXAMINATION:

GENERAL:  When I examined him today, he looked pale, cachectic, but no jaundice

or cyanosis.  No lymphadenopathy, no thyromegaly, no jugular venous distension,

no audible bruit.

VITAL SIGNS:  His heart rate is 68, blood pressure was 122/66, temperature was

97.7, respiratory rate 20, and oxygen saturation was 95% on room air.

HEAD, EYES, EARS, NOSE AND THROAT:  Showed he is normocephalic, atraumatic.

NECK:  Supple.

HEART:  Showed normal first and second heart sounds with no gallop, rub or

murmur.

CHEST:  Clear to auscultation.  No crepitation or rhonchi.

ABDOMEN:  Scaphoid, soft, nontender.

NEUROLOGIC:  He was awake, alert, but very confused; however, all his cranial

nerves are intact.  He has quadriplegia with marked muscle wasting and fixed

flexion contraction of both upper and lower extremities.  He has claw fingers in

both hands.  His left fifth finger is very swollen and erythematous.  The palmar

aspect of the distal phalanx had an open wound that goes all the way down to the

bone.



His intake over the last 24 hours was 4500, output was 3600.



LABORATORY DATA:  As of this morning, his serum sodium is 144, potassium 3.9,

chloride 111, bicarbonate 26, anion gap of 7, BUN 24, creatinine 1.2.  Estimated

GFR was 59 mL per minute.  His glucose was 94, calcium was 8.2.  Total

bilirubin, AST, ALT, alkaline phosphatase were normal.  His total protein was

5.8, albumin 2.  His white cell count was 7500, hemoglobin 11, hematocrit 34,

MCV 91, and platelet count 221,000.



ASSESSMENT:

1.  Left fifth finger fracture.  Possible cellulitis and underlying

osteomyelitis.

2.  Stage II sacral decubitus ulcer.

3.  Cervical myelopathy with quadriplegia.

4.  Other medical problems include:

A.  Hypothyroidism.

B.  Benign prostatic hypertrophy with bladder outlet obstruction requiring

indwelling Gordon catheter.

C.  Chronic constipation.

D.  Chronic kidney injury that has resolved.  In fact, he has acute on chronic

kidney injury.  His creatinine came down from 2 to 1.2.



PLAN:  To continue with IV antibiotic.  Continue with deep vein thrombosis

prophylaxis.  Continue with pain management.  I have left a message with Dr. Cadena to have a look at the x-ray and advice on further management as probably

amputation of the left fifth finger would be the appropriate treatment given

that it is severely fractured and infected.





______________________________

SAMIR SCOTT MD



DR:  SHAWN/timbo  JOB#:  [id number] / [id number]

DD:  05/23/2018 14:57  DT:  05/24/2018 03:44

## 2018-05-24 NOTE — NUR
NURSING DISCHARGE NOTE:

Patient transferred to [facility] at 1348 via cart and accompanied by [facility] EMS personnel.  
Patient's gold ring was in a personal belongings envelope in the paperwork packet sent to 
[facility].  Report was called to REBECA Dumont and she was made aware of the presence of the ring.

## 2018-05-26 ENCOUNTER — HOSPITAL ENCOUNTER (INPATIENT)
Dept: HOSPITAL 63 - GEROPSY | Age: 73
LOS: 16 days | Discharge: NURSING, REHAB OR LONG-TERM CARE | DRG: 885 | End: 2018-06-11
Attending: PSYCHIATRY & NEUROLOGY | Admitting: PSYCHIATRY & NEUROLOGY
Payer: MEDICARE

## 2018-05-26 VITALS — DIASTOLIC BLOOD PRESSURE: 84 MMHG | SYSTOLIC BLOOD PRESSURE: 123 MMHG

## 2018-05-26 VITALS — WEIGHT: 134.56 LBS | BODY MASS INDEX: 19.93 KG/M2 | HEIGHT: 69 IN

## 2018-05-26 DIAGNOSIS — K59.09: ICD-10-CM

## 2018-05-26 DIAGNOSIS — Z79.899: ICD-10-CM

## 2018-05-26 DIAGNOSIS — R64: ICD-10-CM

## 2018-05-26 DIAGNOSIS — M86.8X4: ICD-10-CM

## 2018-05-26 DIAGNOSIS — F31.64: Primary | ICD-10-CM

## 2018-05-26 DIAGNOSIS — Z89.029: ICD-10-CM

## 2018-05-26 DIAGNOSIS — E03.9: ICD-10-CM

## 2018-05-26 DIAGNOSIS — F41.9: ICD-10-CM

## 2018-05-26 DIAGNOSIS — N18.9: ICD-10-CM

## 2018-05-26 DIAGNOSIS — F01.50: ICD-10-CM

## 2018-05-26 DIAGNOSIS — Z66: ICD-10-CM

## 2018-05-26 DIAGNOSIS — F63.9: ICD-10-CM

## 2018-05-26 DIAGNOSIS — G82.50: ICD-10-CM

## 2018-05-26 DIAGNOSIS — F09: ICD-10-CM

## 2018-05-26 DIAGNOSIS — N40.0: ICD-10-CM

## 2018-05-26 DIAGNOSIS — E43: ICD-10-CM

## 2018-05-26 PROCEDURE — 80178 ASSAY OF LITHIUM: CPT

## 2018-05-26 PROCEDURE — 84436 ASSAY OF TOTAL THYROXINE: CPT

## 2018-05-26 PROCEDURE — 80053 COMPREHEN METABOLIC PANEL: CPT

## 2018-05-26 PROCEDURE — 84480 ASSAY TRIIODOTHYRONINE (T3): CPT

## 2018-05-26 PROCEDURE — 85025 COMPLETE CBC W/AUTO DIFF WBC: CPT

## 2018-05-26 PROCEDURE — 83036 HEMOGLOBIN GLYCOSYLATED A1C: CPT

## 2018-05-26 PROCEDURE — 86593 SYPHILIS TEST NON-TREP QUANT: CPT

## 2018-05-26 PROCEDURE — 84439 ASSAY OF FREE THYROXINE: CPT

## 2018-05-26 PROCEDURE — 80061 LIPID PANEL: CPT

## 2018-05-26 PROCEDURE — 82607 VITAMIN B-12: CPT

## 2018-05-26 PROCEDURE — 81001 URINALYSIS AUTO W/SCOPE: CPT

## 2018-05-26 PROCEDURE — 83550 IRON BINDING TEST: CPT

## 2018-05-26 PROCEDURE — 83735 ASSAY OF MAGNESIUM: CPT

## 2018-05-26 PROCEDURE — 83540 ASSAY OF IRON: CPT

## 2018-05-26 PROCEDURE — 84443 ASSAY THYROID STIM HORMONE: CPT

## 2018-05-26 PROCEDURE — 87086 URINE CULTURE/COLONY COUNT: CPT

## 2018-05-26 PROCEDURE — 82306 VITAMIN D 25 HYDROXY: CPT

## 2018-05-26 PROCEDURE — 36415 COLL VENOUS BLD VENIPUNCTURE: CPT

## 2018-05-26 RX ADMIN — BACLOFEN SCH MG: 10 TABLET ORAL at 22:32

## 2018-05-26 RX ADMIN — Medication SCH CAP: at 22:32

## 2018-05-26 RX ADMIN — RISPERIDONE SCH MG: 0.5 TABLET ORAL at 22:32

## 2018-05-26 RX ADMIN — POLYETHYLENE GLYCOL 3350 SCH GM: 17 POWDER, FOR SOLUTION ORAL at 22:32

## 2018-05-26 RX ADMIN — LITHIUM CARBONATE SCH MG: 150 CAPSULE, GELATIN COATED ORAL at 22:31

## 2018-05-26 RX ADMIN — TERAZOSIN HYDROCHLORIDE SCH MG: 5 CAPSULE ORAL at 22:31

## 2018-05-26 RX ADMIN — SENNOSIDES A AND B SCH MG: 8.6 TABLET, FILM COATED ORAL at 22:32

## 2018-05-26 RX ADMIN — MIRTAZAPINE SCH MG: 7.5 TABLET, FILM COATED ORAL at 22:32

## 2018-05-26 NOTE — PDOC
Exam


Note:


Shai Note:


Please also refer to the separate dictated note~for this date of service 

dictated separately.~Patient seen individually. Discussed the patient with 

Nursing staff reviewed the chart.~Reviewed interim history and current 

functioning. Reviewed vital signs,~Labs/ Radiology~and current medications 

noted below. Continue current treatment with the changes noted in the dictated 

addendum note





Assessment:


Vital Signs:





 Vital Signs








  Date Time  Temp Pulse Resp B/P (MAP) Pulse Ox O2 Delivery O2 Flow Rate FiO2


 


5/26/18 22:31  71  123/84    


 


5/26/18 18:44 98.0  18  97   











Current Medications:


Meds:





Current Medications


Acetaminophen (Tylenol) 650 mg PRN Q6HRS  PRN PO PAIN / TEMP;  Start 5/26/18 at 

21:15


Multi-Ingredient Ointment (Analgesic Balm) 1 bob PRN QID  PRN TP MUSCLE PAIN;  

Start 5/26/18 at 21:15


Al Hydroxide/Mg Hydroxide (Mylanta Plus Xs) 15 ml PRN AFTMEALHC  PRN PO 

DYSPEPSIA;  Start 5/26/18 at 21:15


Magnesium Hydroxide (Milk Of Magnesia) 2,400 mg PRN QHS  PRN PO CONSTIPATION;  

Start 5/26/18 at 21:15


Mirtazapine (Remeron) 7.5 mg QHS PO  Last administered on 5/26/18at 22:32;  

Start 5/26/18 at 22:00


Olanzapine (ZyPREXA ZYDIS) 5 mg PRN Q2HR  PRN PO PSYCHOSIS;  Start 5/26/18 at 21

:15


Baclofen (Lioresal) 10 mg BID PO  Last administered on 5/26/18at 22:32;  Start 5 /26/18 at 22:00


Lithium Carbonate 150 mg DAILY PO ;  Start 5/27/18 at 09:00


Lithium Carbonate 300 mg QHS PO  Last administered on 5/26/18at 22:31;  Start 5/ 26/18 at 22:00


Lorazepam (Ativan) 0.5 mg PRN TID  PRN PO ANXIETY / AGITATION;  Start 5/26/18 

at 21:30


Risperidone (RisperDAL) 1.5 mg QHS PO  Last administered on 5/26/18at 22:32;  

Start 5/26/18 at 22:00


Trazodone HCl (Desyrel) 50 mg PRN QHS  PRN PO INSOMNIA, MAY REPEAT X1;  Start 5/ 26/18 at 21:30


Vitamin D (Vitamin D3) 5,000 unit DAILY PO ;  Start 5/27/18 at 09:00


Multivitamins/ Calcium (Thera-M Plus) 1 tab DAILY PO ;  Start 5/27/18 at 09:00


Neomycin/ Polymyxin/ Bacitracin (Triple Antibiotic Ointment) 1 pkt PRN DAILY  

PRN TP Wound Healing;  Start 5/26/18 at 21:15


Simethicone (Gas-X) 80 mg PRN AFTMEALHC  PRN PO GAS / BLOATING;  Start 5/26/18 

at 21:15


Ascorbic Acid (Vitamin C) 500 mg DAILY PO ;  Start 5/27/18 at 09:00


Bisacodyl (Dulcolax Supp) 10 mg PRN DAILY  PRN [place] CONSTIPATION;  Start 5/26/18 

at 21:30


Cetirizine HCl (ZyrTEC) 10 mg DAILY PO ;  Start 5/27/18 at 09:00


Docusate Sodium (Colace) 100 mg PRN BID  PRN PO CONSTIPATION;  Start 5/26/18 at 

21:30


Famotidine (Pepcid) 20 mg BIDBFRMEAL PO ;  Start 5/27/18 at 07:30


Finasteride (Proscar) 5 mg DAILY PO ;  Start 5/27/18 at 09:00


Lactobacillus Rhamnosus (Culturelle) 1 cap BID PO  Last administered on 5/26/ 18at 22:32;  Start 5/26/18 at 22:00


Oxycodone HCl (Roxicodone) 5 mg PRN Q6HRS  PRN PO PAIN;  Start 5/26/18 at 21:30


Polyethylene Glycol (miraLAX) 17 gm BID PO  Last administered on 5/26/18at 22:32

;  Start 5/26/18 at 22:00


Sennosides (Senna) 8.6 mg BID PO  Last administered on 5/26/18at 22:32;  Start 5 /26/18 at 22:00


Terazosin HCl (Hytrin) 10 mg QHS PO  Last administered on 5/26/18at 22:31;  

Start 5/26/18 at 22:00


Ondansetron HCl (Zofran Odt) 4 mg PRN Q8HRS  PRN PO NAUSEA/VOMITING;  Start 5/26 /18 at 21:30





Active Scripts


Active


Reported


Zofran Odt (Ondansetron) 4 Mg Tab.rapdis 4 Mg PO PRN Q6HRS PRN


Simethicone 80 Mg Tab.chew 80 Mg PO PRN AFTMEALHC PRN


Thera M Plus Tablet (Multivits,Ca,Minerals/Iron/FA) 1 Each Tablet 1 Tab PO DAILY


Mirtazapine 7.5 Mg Tablet 7.5 Mg PO QHS


Milk Of Magnesia (Magnesium Hydroxide) 400 Mg/5 Ml Oral.susp 2,400 Mg PO PRN 

DAILY PRN


Culturelle (Lactobacillus Rhamnosus Gg) 1 Each Capsule 1 Each PO BID


Vitamin D3 (Cholecalciferol (Vitamin D3)) 1,000 Unit Tablet 5,000 Unit PO DAILY


Zyrtec (Cetirizine Hcl) 10 Mg Tablet 10 Mg PO DAILY


Vitamin C (Ascorbate Calcium) 500 Mg Tablet 500 Mg PO DAILY


Trazodone Hcl 50 Mg Tablet 50 Mg PO PRN QHS PRN


Risperdal (Risperidone) 1 Mg Tablet 1.5 Mg PO QHS


Zyprexa Zydis (Olanzapine) 5 Mg Tab.rapdis 5 Mg PO PRN Q2HR PRN


Lithium Carbonate 150 Mg Capsule 150 Mg PO DAILY


Miralax (Polyethylene Glycol 3350) 17 Gm Powd.pack 17 Gm PO BID


Triple Antibiotic Ointment (Neomy Sulf/Bacitra/Polymyxin B) 1 Each Packet 1 

Packet TP PRN DAILY PRN


Oxycodone Hcl 5 Mg Capsule 5 Mg PO PRN Q6HRS PRN


Lorazepam 0.5 Mg Tablet 0.5 Mg PO TID PRN PRN


Famotidine 20 Mg Tablet 20 Mg PO BIDBFRMEAL


Terazosin Hcl 10 Mg Capsule 10 Mg PO DAILY


Lithium Carbonate 300 Mg Capsule 300 Mg PO HS


Baclofen 10 Mg Tablet 10 Mg PO BID


Maalox Maximum Strength Susp (Mag Hydrox/Al Hydrox/Simeth) 355 Ml Oral.susp 30 

Ml PO PRN Q2HR PRN


Finasteride 5 Mg Tablet 5 Mg PO DAILY


Colace (Docusate Sodium) 100 Mg Capsule 100 Mg PO PRN BID PRN


Senokot (Sennosides) 8.6 Mg Tablet 8.6 Mg PO BID


Bisacodyl 10 Mg Supp.rect 10 Mg RC PRN DAILY PRN


Tylenol (Acetaminophen) 325 Mg Tablet 650 Mg PO PRN Q6HRS PRN


I have reviewed the current psychotropics carefully including drug 

interactions.  Risk benefit ratio favors no change other than as noted in my 

dictated progress note.





Diagnosis:


Problems:  


(1) Anxiety disorder


(2) Bipolar affective, mixed, sev w/ psych


(3) Dementia, vascular, with delusions


(4) Mild cognitive impairment











DIANA STALLINGS MD May 26, 2018 23:20

## 2018-05-26 NOTE — NUR
Admission Note with Justification for Admission to [facility]

Patient admitted to [facility] for protective oversight for emergency stabilization of acute 
psychiatric crisis.



Pt admitted from: [facility]



Mode of arrival: EMS



Accompanied By: EMS



Precipitating behaviors that initiated intake and admission: pt discharged from [facility] to 
[place] for medical reasons. Pt remains delusional and is still in need of med adjustment



Description of failure of out patient attempts at stabilization in previous setting list 
behavior and medication trials: Continued on current meds.



Behaviors and assessment findings upon admission: Pt in pleasant spirits. Pt states he is 
glad to see us again.



Plan: Admit for protective oversight for adjustment and stabilization of medications, 
behaviors and mood.  Intense treatment regimen including groups, medication adjustments, 
therapy, consistent regimen for ADL's, self care, and sleep hygiene. Daily monitoring by 
Inpatient staff, Psychiatry, and Medical Physician.

## 2018-05-27 VITALS — DIASTOLIC BLOOD PRESSURE: 79 MMHG | SYSTOLIC BLOOD PRESSURE: 118 MMHG

## 2018-05-27 VITALS — SYSTOLIC BLOOD PRESSURE: 105 MMHG | DIASTOLIC BLOOD PRESSURE: 62 MMHG

## 2018-05-27 LAB
ALBUMIN SERPL-MCNC: 2.6 G/DL (ref 3.4–5)
ALBUMIN/GLOB SERPL: 0.5 {RATIO} (ref 1–1.7)
ALP SERPL-CCNC: 97 U/L (ref 46–116)
ALT SERPL-CCNC: 19 U/L (ref 16–63)
ANION GAP SERPL CALC-SCNC: 8 MMOL/L (ref 6–14)
APTT PPP: (no result) S
AST SERPL-CCNC: 17 U/L (ref 15–37)
BACTERIA #/AREA URNS HPF: 0 /HPF
BASOPHILS # BLD AUTO: 0.1 X10^3/UL (ref 0–0.2)
BASOPHILS NFR BLD: 1 % (ref 0–3)
BILIRUB SERPL-MCNC: 0.6 MG/DL (ref 0.2–1)
BILIRUB UR QL STRIP: (no result)
BUN/CREAT SERPL: 13 (ref 6–20)
CA-I SERPL ISE-MCNC: 15 MG/DL (ref 8–26)
CALCIUM SERPL-MCNC: 8.7 MG/DL (ref 8.5–10.1)
CHLORIDE SERPL-SCNC: 107 MMOL/L (ref 98–107)
CHOLEST/HDLC SERPL: 4 {RATIO}
CO2 SERPL-SCNC: 28 MMOL/L (ref 21–32)
CREAT SERPL-MCNC: 1.2 MG/DL (ref 0.7–1.3)
EOSINOPHIL NFR BLD: 0.1 X10^3/UL (ref 0–0.7)
EOSINOPHIL NFR BLD: 1 % (ref 0–3)
ERYTHROCYTE [DISTWIDTH] IN BLOOD BY AUTOMATED COUNT: 16 % (ref 11.5–14.5)
FIBRINOGEN PPP-MCNC: (no result) MG/DL
GFR SERPLBLD BASED ON 1.73 SQ M-ARVRAT: 59.3 ML/MIN
GLOBULIN SER-MCNC: 5.2 G/DL (ref 2.2–3.8)
GLUCOSE SERPL-MCNC: 129 MG/DL (ref 70–99)
GLUCOSE UR STRIP-MCNC: (no result) MG/DL
HBA1C MFR BLD: 5 % (ref 4.8–5.6)
HCT VFR BLD CALC: 41.2 % (ref 39–53)
HDLC SERPL-MCNC: 32 MG/DL (ref 40–60)
HGB BLD-MCNC: 13.3 G/DL (ref 13–17.5)
LDLC: 94 MG/DL (ref 0–100)
LITHIUM SERPL-SCNC: 0.6 MMOL/L (ref 0.6–1.2)
LYMPHOCYTES # BLD: 2.9 X10^3/UL (ref 1–4.8)
LYMPHOCYTES NFR BLD AUTO: 39 % (ref 24–48)
MAGNESIUM SERPL-MCNC: 2.6 MG/DL (ref 1.8–2.4)
MCH RBC QN AUTO: 30 PG (ref 25–35)
MCHC RBC AUTO-ENTMCNC: 32 G/DL (ref 31–37)
MCV RBC AUTO: 92 FL (ref 79–100)
MONO #: 0.4 X10^3/UL (ref 0–1.1)
MONOCYTES NFR BLD: 5 % (ref 0–9)
NEUT #: 4 X10^3UL (ref 1.8–7.7)
NEUTROPHILS NFR BLD AUTO: 54 % (ref 31–73)
NITRITE UR QL STRIP: (no result)
PLATELET # BLD AUTO: 238 X10^3/UL (ref 140–400)
POTASSIUM SERPL-SCNC: 3.8 MMOL/L (ref 3.5–5.1)
PROT SERPL-MCNC: 7.8 G/DL (ref 6.4–8.2)
RBC # BLD AUTO: 4.47 X10^6/UL (ref 4.3–5.7)
RBC #/AREA URNS HPF: >40 /HPF (ref 0–2)
SODIUM SERPL-SCNC: 143 MMOL/L (ref 136–145)
SP GR UR STRIP: 1.01
SQUAMOUS #/AREA URNS LPF: (no result) /LPF
THYROID STIM HORMONE (TSH): 25.74 UIU/ML (ref 0.36–3.74)
TRIGL SERPL-MCNC: 85 MG/DL (ref 0–150)
UROBILINOGEN UR-MCNC: 0.2 MG/DL
VLDLC: 17 MG/DL (ref 0–40)
WBC # BLD AUTO: 7.4 X10^3/UL (ref 4–11)
WBC #/AREA URNS HPF: (no result) /HPF (ref 0–4)

## 2018-05-27 RX ADMIN — LITHIUM CARBONATE SCH MG: 150 CAPSULE, GELATIN COATED ORAL at 20:33

## 2018-05-27 RX ADMIN — MULTIPLE VITAMINS W/ MINERALS TAB SCH TAB: TAB at 07:49

## 2018-05-27 RX ADMIN — Medication SCH CAP: at 20:33

## 2018-05-27 RX ADMIN — Medication SCH CAP: at 07:49

## 2018-05-27 RX ADMIN — POLYETHYLENE GLYCOL 3350 SCH GM: 17 POWDER, FOR SOLUTION ORAL at 20:32

## 2018-05-27 RX ADMIN — SENNOSIDES A AND B SCH MG: 8.6 TABLET, FILM COATED ORAL at 07:48

## 2018-05-27 RX ADMIN — TERAZOSIN HYDROCHLORIDE SCH MG: 5 CAPSULE ORAL at 20:32

## 2018-05-27 RX ADMIN — MIRTAZAPINE SCH MG: 7.5 TABLET, FILM COATED ORAL at 20:33

## 2018-05-27 RX ADMIN — RISPERIDONE SCH MG: 0.5 TABLET ORAL at 20:33

## 2018-05-27 RX ADMIN — FINASTERIDE SCH MG: 5 TABLET, FILM COATED ORAL at 07:49

## 2018-05-27 RX ADMIN — POLYETHYLENE GLYCOL 3350 SCH GM: 17 POWDER, FOR SOLUTION ORAL at 07:48

## 2018-05-27 RX ADMIN — BACLOFEN SCH MG: 10 TABLET ORAL at 07:49

## 2018-05-27 RX ADMIN — FAMOTIDINE SCH MG: 20 TABLET ORAL at 20:34

## 2018-05-27 RX ADMIN — SENNOSIDES A AND B SCH MG: 8.6 TABLET, FILM COATED ORAL at 20:32

## 2018-05-27 RX ADMIN — OXYCODONE HYDROCHLORIDE AND ACETAMINOPHEN SCH MG: 500 TABLET ORAL at 07:48

## 2018-05-27 RX ADMIN — BACLOFEN SCH MG: 10 TABLET ORAL at 20:34

## 2018-05-27 RX ADMIN — CETIRIZINE HYDROCHLORIDE SCH MG: 10 TABLET, FILM COATED ORAL at 07:48

## 2018-05-27 RX ADMIN — LITHIUM CARBONATE SCH MG: 150 CAPSULE, GELATIN COATED ORAL at 07:49

## 2018-05-27 RX ADMIN — VITAMIN D, TAB 1000IU (100/BT) SCH UNIT: 25 TAB at 07:49

## 2018-05-27 NOTE — PDOC
Exam


Note:


Shai Note:


Please also refer to the separate dictated note~for this date of service 

dictated separately.~Patient seen individually. Discussed the patient with 

Nursing staff reviewed the chart.~Reviewed interim history and current 

functioning. Reviewed vital signs,~Labs/ Radiology~and current medications 

noted below. Continue current treatment with the changes noted in the dictated 

addendum note





Assessment:


Vital Signs:





 Vital Signs








  Date Time  Temp Pulse Resp B/P (MAP) Pulse Ox O2 Delivery O2 Flow Rate FiO2


 


5/27/18 20:32  66  118/79    


 


5/27/18 16:45 97.8  20  98   


 


5/27/18 06:31      Room Air  








I&O











Intake and Output 


 


 5/27/18





 07:00


 


Intake Total 300 ml


 


Output Total 850 ml


 


Balance -550 ml


 


 


 


Intake Oral 300 ml


 


Output Urine Total 850 ml








Labs:





 Laboratory Tests








Test


 5/27/18


10:04 5/27/18


12:30


 


White Blood Count


 7.4 x10^3/uL


(4.0-11.0) 





 


Red Blood Count


 4.47 x10^6/uL


(4.30-5.70) 





 


Hemoglobin


 13.3 g/dL


(13.0-17.5) 





 


Hematocrit


 41.2 %


(39.0-53.0) 





 


Mean Corpuscular Volume


 92 fL ()


 





 


Mean Corpuscular Hemoglobin 30 pg (25-35)   


 


Mean Corpuscular Hemoglobin


Concent 32 g/dL


(31-37) 





 


Red Cell Distribution Width


 16.0 %


(11.5-14.5)  H 





 


Platelet Count


 238 x10^3/uL


(140-400) 





 


Neutrophils (%) (Auto) 54 % (31-73)   


 


Lymphocytes (%) (Auto) 39 % (24-48)   


 


Monocytes (%) (Auto) 5 % (0-9)   


 


Eosinophils (%) (Auto) 1 % (0-3)   


 


Basophils (%) (Auto) 1 % (0-3)   


 


Neutrophils # (Auto)


 4.0 x10^3uL


(1.8-7.7) 





 


Lymphocytes # (Auto)


 2.9 x10^3/uL


(1.0-4.8) 





 


Monocytes # (Auto)


 0.4 x10^3/uL


(0.0-1.1) 





 


Eosinophils # (Auto)


 0.1 x10^3/uL


(0.0-0.7) 





 


Basophils # (Auto)


 0.1 x10^3/uL


(0.0-0.2) 





 


Sodium Level


 143 mmol/L


(136-145) 





 


Potassium Level


 3.8 mmol/L


(3.5-5.1) 





 


Chloride Level


 107 mmol/L


() 





 


Carbon Dioxide Level


 28 mmol/L


(21-32) 





 


Anion Gap 8 (6-14)   


 


Blood Urea Nitrogen


 15 mg/dL


(8-26) 





 


Creatinine


 1.2 mg/dL


(0.7-1.3) 





 


Estimated GFR


(Cockcroft-Gault) 59.3  


 





 


BUN/Creatinine Ratio 13 (6-20)   


 


Glucose Level


 129 mg/dL


(70-99)  H 





 


Calcium Level


 8.7 mg/dL


(8.5-10.1) 





 


Magnesium Level


 2.6 mg/dL


(1.8-2.4)  H 





 


Iron Level


 45 ug/dL


()  L 





 


Total Iron Binding Capacity


 205 ug/dL


(250-450)  L 





 


Iron Saturation 22 % (15-34)   


 


Total Bilirubin


 0.6 mg/dL


(0.2-1.0) 





 


Aspartate Amino Transferase


(AST) 17 U/L (15-37)


 





 


Alanine Aminotransferase (ALT)


 19 U/L (16-63)


 





 


Alkaline Phosphatase


 97 U/L


() 





 


Total Protein


 7.8 g/dL


(6.4-8.2) 





 


Albumin


 2.6 g/dL


(3.4-5.0)  L 





 


Albumin/Globulin Ratio


 0.5 (1.0-1.7)


L 





 


Triglycerides Level


 85 mg/dL


(0-150) 





 


Cholesterol Level


 143 mg/dL


(0-200) 





 


LDL Cholesterol, Calculated


 94 mg/dL


(0-100) 





 


VLDL Cholesterol, Calculated


 17 mg/dL


(0-40) 





 


Non-HDL Cholesterol Calculated


 111 mg/dL


(0-129) 





 


HDL Cholesterol


 32 mg/dL


(40-60)  L 





 


Cholesterol/HDL Ratio 4.0   


 


Thyroid Stimulating Hormone


(TSH) 25.736 uIU/mL


(0.358-3.740) 





 


Lithium Level


 0.6 mmol/L


(0.6-1.2) 





 


Lithium Last Dose Date Unknown   


 


Lithium Last Dose Time Unknown   


 


Urine Collection Type  Unknown  


 


Urine Color  Pink  


 


Urine Clarity  Hazy  


 


Urine pH  8.5  


 


Urine Specific Gravity  1.015  


 


Urine Protein


 


 100 mg/dl


(NEG-TRACE)


 


Urine Glucose (UA)


 


 Neg mg/dL


(NEG)


 


Urine Ketones (Stick)


 


 Neg mg/dL


(NEG)


 


Urine Blood  Large (NEG)  


 


Urine Nitrite  Neg (NEG)  


 


Urine Bilirubin  Neg (NEG)  


 


Urine Urobilinogen Dipstick


 


 0.2 mg/dL (0.2


mg/dL)


 


Urine Leukocyte Esterase  Trace (NEG)  


 


Urine RBC


 


 >40 /HPF (0-2)





 


Urine WBC


 


 Occ /HPF (0-4)





 


Urine Squamous Epithelial


Cells 


 Occ /LPF  





 


Urine Bacteria


 


 0 /HPF (0-FEW)














Current Medications:


Meds:





Current Medications


Acetaminophen (Tylenol) 650 mg PRN Q6HRS  PRN PO PAIN / TEMP;  Start 5/26/18 at 

21:15


Multi-Ingredient Ointment (Analgesic Balm) 1 bob PRN QID  PRN TP MUSCLE PAIN;  

Start 5/26/18 at 21:15


Al Hydroxide/Mg Hydroxide (Mylanta Plus Xs) 15 ml PRN AFTMEALHC  PRN PO 

DYSPEPSIA Last administered on 5/27/18at 03:43;  Start 5/26/18 at 21:15


Magnesium Hydroxide (Milk Of Magnesia) 2,400 mg PRN QHS  PRN PO CONSTIPATION;  

Start 5/26/18 at 21:15


Mirtazapine (Remeron) 7.5 mg QHS PO  Last administered on 5/27/18at 20:33;  

Start 5/26/18 at 22:00


Olanzapine (ZyPREXA ZYDIS) 5 mg PRN Q2HR  PRN PO PSYCHOSIS;  Start 5/26/18 at 21

:15


Baclofen (Lioresal) 10 mg BID PO  Last administered on 5/27/18at 20:34;  Start 5 /26/18 at 22:00


Lithium Carbonate 150 mg DAILY PO  Last administered on 5/27/18at 07:49;  Start 

5/27/18 at 09:00


Lithium Carbonate 300 mg QHS PO  Last administered on 5/27/18at 20:33;  Start 5/ 26/18 at 22:00


Lorazepam (Ativan) 0.5 mg PRN TID  PRN PO ANXIETY / AGITATION;  Start 5/26/18 

at 21:30


Risperidone (RisperDAL) 1.5 mg QHS PO  Last administered on 5/27/18at 20:33;  

Start 5/26/18 at 22:00


Trazodone HCl (Desyrel) 50 mg PRN QHS  PRN PO INSOMNIA, MAY REPEAT X1;  Start 5/ 26/18 at 21:30


Vitamin D (Vitamin D3) 5,000 unit DAILY PO  Last administered on 5/27/18at 07:49

;  Start 5/27/18 at 09:00


Multivitamins/ Calcium (Thera-M Plus) 1 tab DAILY PO  Last administered on 5/27/ 18at 07:49;  Start 5/27/18 at 09:00


Neomycin/ Polymyxin/ Bacitracin (Triple Antibiotic Ointment) 1 pkt PRN DAILY  

PRN TP Wound Healing;  Start 5/26/18 at 21:15


Simethicone (Gas-X) 80 mg PRN AFTMEALHC  PRN PO GAS / BLOATING;  Start 5/26/18 

at 21:15


Ascorbic Acid (Vitamin C) 500 mg DAILY PO  Last administered on 5/27/18at 07:48

;  Start 5/27/18 at 09:00


Bisacodyl (Dulcolax Supp) 10 mg PRN DAILY  PRN [place] CONSTIPATION;  Start 5/26/18 

at 21:30


Cetirizine HCl (ZyrTEC) 10 mg DAILY PO  Last administered on 5/27/18at 07:48;  

Start 5/27/18 at 09:00


Docusate Sodium (Colace) 100 mg PRN BID  PRN PO CONSTIPATION;  Start 5/26/18 at 

21:30


Famotidine (Pepcid) 20 mg BIDBFRMEAL PO  Last administered on 5/27/18at 07:49;  

Start 5/27/18 at 07:30;  Stop 5/27/18 at 07:53;  Status DC


Finasteride (Proscar) 5 mg DAILY PO  Last administered on 5/27/18at 07:49;  

Start 5/27/18 at 09:00


Lactobacillus Rhamnosus (Culturelle) 1 cap BID PO  Last administered on 5/27/ 18at 20:33;  Start 5/26/18 at 22:00


Oxycodone HCl (Roxicodone) 5 mg PRN Q6HRS  PRN PO PAIN Last administered on 5/27 /18at 05:31;  Start 5/26/18 at 21:30


Polyethylene Glycol (miraLAX) 17 gm BID PO  Last administered on 5/27/18at 20:32

;  Start 5/26/18 at 22:00


Sennosides (Senna) 8.6 mg BID PO  Last administered on 5/27/18at 20:32;  Start 5 /26/18 at 22:00


Terazosin HCl (Hytrin) 10 mg QHS PO  Last administered on 5/27/18at 20:32;  

Start 5/26/18 at 22:00


Ondansetron HCl (Zofran Odt) 4 mg PRN Q8HRS  PRN PO NAUSEA/VOMITING;  Start 5/26 /18 at 21:30


Famotidine (Pepcid) 20 mg BID PO  Last administered on 5/27/18at 20:34;  Start 5 /27/18 at 21:00


Levothyroxine Sodium (Synthroid) 50 mcg DAILY06 PO ;  Start 5/28/18 at 06:00





Active Scripts


Active


Reported


Zofran Odt (Ondansetron) 4 Mg Tab.rapdis 4 Mg PO PRN Q6HRS PRN


Simethicone 80 Mg Tab.chew 80 Mg PO PRN AFTMEALHC PRN


Thera M Plus Tablet (Multivits,Ca,Minerals/Iron/FA) 1 Each Tablet 1 Tab PO DAILY


Mirtazapine 7.5 Mg Tablet 7.5 Mg PO QHS


Milk Of Magnesia (Magnesium Hydroxide) 400 Mg/5 Ml Oral.susp 2,400 Mg PO PRN 

DAILY PRN


Culturelle (Lactobacillus Rhamnosus Gg) 1 Each Capsule 1 Each PO BID


Vitamin D3 (Cholecalciferol (Vitamin D3)) 1,000 Unit Tablet 5,000 Unit PO DAILY


Zyrtec (Cetirizine Hcl) 10 Mg Tablet 10 Mg PO DAILY


Vitamin C (Ascorbate Calcium) 500 Mg Tablet 500 Mg PO DAILY


Trazodone Hcl 50 Mg Tablet 50 Mg PO PRN QHS PRN


Risperdal (Risperidone) 1 Mg Tablet 1.5 Mg PO QHS


Zyprexa Zydis (Olanzapine) 5 Mg Tab.rapdis 5 Mg PO PRN Q2HR PRN


Lithium Carbonate 150 Mg Capsule 150 Mg PO DAILY


Miralax (Polyethylene Glycol 3350) 17 Gm Powd.pack 17 Gm PO BID


Triple Antibiotic Ointment (Neomy Sulf/Bacitra/Polymyxin B) 1 Each Packet 1 

Packet TP PRN DAILY PRN


Oxycodone Hcl 5 Mg Capsule 5 Mg PO PRN Q6HRS PRN


Lorazepam 0.5 Mg Tablet 0.5 Mg PO TID PRN PRN


Famotidine 20 Mg Tablet 20 Mg PO BIDBFRMEAL


Terazosin Hcl 10 Mg Capsule 10 Mg PO DAILY


Lithium Carbonate 300 Mg Capsule 300 Mg PO HS


Baclofen 10 Mg Tablet 10 Mg PO BID


Maalox Maximum Strength Susp (Mag Hydrox/Al Hydrox/Simeth) 355 Ml Oral.susp 30 

Ml PO PRN Q2HR PRN


Finasteride 5 Mg Tablet 5 Mg PO DAILY


Colace (Docusate Sodium) 100 Mg Capsule 100 Mg PO PRN BID PRN


Senokot (Sennosides) 8.6 Mg Tablet 8.6 Mg PO BID


Bisacodyl 10 Mg Supp.rect 10 Mg RC PRN DAILY PRN


Tylenol (Acetaminophen) 325 Mg Tablet 650 Mg PO PRN Q6HRS PRN


I have reviewed the current psychotropics carefully including drug 

interactions.  Risk benefit ratio favors no change other than as noted in my 

dictated progress note.





Diagnosis:


Problems:  


(1) Anxiety disorder


(2) Bipolar affective, mixed, sev w/ psych


(3) Dementia, vascular, with delusions


(4) Mild cognitive impairment


(5) Osteomyelitis of hand, left, acute


(6) Quadriplegia











DIANA STALLINGS MD May 27, 2018 20:53

## 2018-05-27 NOTE — NUR
Behavior Intervention Response and Plan:

BIRP Note:

Behavior: Assumed Care of patient, patient located in Patient Room at shift change.  Patient 
exhibited the following behavior Calm, Disorganized, Hallucinating.  Brief assessment on 
rounds of vital signs, medication needs, lab studies, and pain.  Treatment plan problems 1 
and 2.



Intervention: Patient assessed and the following interventions initiated safety checks 15 
Minute Checks Cognitive Assessment , Head to toe Assessment , Medications.



Response: After interactions and interventions patient responded in the following manner, 
Calm , Compliant ,Cooperative.  Continue to assess behaviors and condition will continue to 
monitor throughout the shift as needed. Patient educated on ADL's, and hand hygiene.



Plan: Continue to monitor Master Treatment Plan for patient's progress toward short term 
goals of Improved Mood, No harm To self/ others, long term goals to return to previous 
living setting vs placement.   Continue to assess patient for changes in above assessment.  
Monitor for medication needs, pain, and safety concerns.  Hourly rounding performed to 
ensure safe environment.

## 2018-05-27 NOTE — NUR
Behavior Intervention Response and Plan:

BIRP Note:

Behavior: Assumed Care of patient, patient located in Patient Room at shift change.  Patient 
exhibited the following behavior Calm, Cooperative, Interactive.  Brief assessment on rounds 
of vital signs, medication needs, lab studies, and pain.  Treatment plan problems 1 and 2.



Intervention: Patient assessed and the following interventions initiated safety checks 15 
Minute Checks Cognitive Assessment , Head to toe Assessment , Medications.



Response: After interactions and interventions patient responded in the following manner, 
Calm , Compliant ,Cooperative.  Continue to assess behaviors and condition will continue to 
monitor throughout the shift as needed. Patient educated on ADL's, and hand hygiene.



Plan: Continue to monitor Master Treatment Plan for patient's progress toward short term 
goals of Improved Mood, No harm To self/ others, long term goals to return to previous 
living setting vs placement.   Continue to assess patient for changes in above assessment.  
Monitor for medication needs, pain, and safety concerns.  Hourly rounding performed to 
ensure safe environment.

## 2018-05-27 NOTE — NUR
pt in bed for meals. awaiting a broda chair. pt delusional at times but in pleasant spirits. 
Compliant with meds and cares. yelling out for short time in afternoon. Surgical dressing 
removed. xeroform gauze, 4x4 and ace reapllied. pt tolerated well. sutures intact. slight 
redness to site. mild swelling to digits and hand. pt voices no c/o.

## 2018-05-27 NOTE — NUR
Behavior Intervention Response and Plan:

BIRP Note:

Behavior: Assumed Care of patient, patient located in Patient Room at shift change.  Patient 
exhibited the following behavior Disorganized, Delusions, Compliant.  Brief assessment on 
rounds of vital signs, medication needs, lab studies, and pain.  Treatment plan problems .



Intervention: Patient assessed and the following interventions initiated safety checks 15 
Minute Checks Cognitive Assessment , Head to toe Assessment , Medications.



Response: After interactions and interventions patient responded in the following manner, 
Disorganized , Delusions ,Compliant.  Continue to assess behaviors and condition will 
continue to monitor throughout the shift as needed. Patient educated on ADL's, and hand 
hygiene.



Plan: Continue to monitor Master Treatment Plan for patient's progress toward short term 
goals of Decreased Agitation, Decreased Anxiety, long term goals to return to previous 
living setting vs placement.   Continue to assess patient for changes in above assessment.  
Monitor for medication needs, pain, and safety concerns.  Hourly rounding performed to 
ensure safe environment.

## 2018-05-28 VITALS — SYSTOLIC BLOOD PRESSURE: 115 MMHG | DIASTOLIC BLOOD PRESSURE: 62 MMHG

## 2018-05-28 VITALS — DIASTOLIC BLOOD PRESSURE: 64 MMHG | SYSTOLIC BLOOD PRESSURE: 106 MMHG

## 2018-05-28 LAB
T3 SERPL-MCNC: 73 NG/DL (ref 71–180)
T4 SERPL-MCNC: 4.9 UG/DL (ref 4.5–12)
T4 SERPL-MCNC: 5.2 UG/DL (ref 4.5–12)

## 2018-05-28 RX ADMIN — POLYETHYLENE GLYCOL 3350 SCH GM: 17 POWDER, FOR SOLUTION ORAL at 19:40

## 2018-05-28 RX ADMIN — MULTIPLE VITAMINS W/ MINERALS TAB SCH TAB: TAB at 07:59

## 2018-05-28 RX ADMIN — SENNOSIDES A AND B SCH MG: 8.6 TABLET, FILM COATED ORAL at 07:59

## 2018-05-28 RX ADMIN — Medication SCH CAP: at 19:42

## 2018-05-28 RX ADMIN — TERAZOSIN HYDROCHLORIDE SCH MG: 5 CAPSULE ORAL at 19:41

## 2018-05-28 RX ADMIN — SENNOSIDES A AND B SCH MG: 8.6 TABLET, FILM COATED ORAL at 19:41

## 2018-05-28 RX ADMIN — MIRTAZAPINE SCH MG: 7.5 TABLET, FILM COATED ORAL at 19:41

## 2018-05-28 RX ADMIN — BACLOFEN SCH MG: 10 TABLET ORAL at 07:59

## 2018-05-28 RX ADMIN — Medication SCH CAP: at 07:59

## 2018-05-28 RX ADMIN — LITHIUM CARBONATE SCH MG: 150 CAPSULE, GELATIN COATED ORAL at 19:41

## 2018-05-28 RX ADMIN — CETIRIZINE HYDROCHLORIDE SCH MG: 10 TABLET, FILM COATED ORAL at 07:58

## 2018-05-28 RX ADMIN — FINASTERIDE SCH MG: 5 TABLET, FILM COATED ORAL at 07:58

## 2018-05-28 RX ADMIN — VITAMIN D, TAB 1000IU (100/BT) SCH UNIT: 25 TAB at 07:58

## 2018-05-28 RX ADMIN — BACLOFEN SCH MG: 10 TABLET ORAL at 19:41

## 2018-05-28 RX ADMIN — LITHIUM CARBONATE SCH MG: 150 CAPSULE, GELATIN COATED ORAL at 07:59

## 2018-05-28 RX ADMIN — OXYCODONE HYDROCHLORIDE AND ACETAMINOPHEN SCH MG: 500 TABLET ORAL at 08:00

## 2018-05-28 RX ADMIN — FAMOTIDINE SCH MG: 20 TABLET ORAL at 19:41

## 2018-05-28 RX ADMIN — LEVOTHYROXINE SODIUM SCH MCG: 50 TABLET ORAL at 05:03

## 2018-05-28 RX ADMIN — POLYETHYLENE GLYCOL 3350 SCH GM: 17 POWDER, FOR SOLUTION ORAL at 07:58

## 2018-05-28 RX ADMIN — FAMOTIDINE SCH MG: 20 TABLET ORAL at 07:59

## 2018-05-28 RX ADMIN — RISPERIDONE SCH MG: 0.5 TABLET ORAL at 19:42

## 2018-05-28 NOTE — NUR
pt in pleasant spirits. compliant with meds. yelled out x1 today. able to track with a 
conversation. appetite poor. drinks fluids well.

## 2018-05-28 NOTE — PDOC
Exam


Note:


Shai Note:


Please also refer to the separate dictated note~for this date of service 

dictated separately.~Patient seen individually. Discussed the patient with 

Nursing staff reviewed the chart.~Reviewed interim history and current 

functioning. Reviewed vital signs,~Labs/ Radiology~and current medications 

noted below. Continue current treatment with the changes noted in the dictated 

addendum note





Assessment:


Vital Signs:





 Vital Signs








  Date Time  Temp Pulse Resp B/P (MAP) Pulse Ox O2 Delivery O2 Flow Rate FiO2


 


5/28/18 19:43   20   Room Air  


 


5/28/18 19:41  76  106/64    


 


5/28/18 15:40 97.7    96   








I&O











Intake and Output 


 


 5/28/18





 07:00


 


Intake Total 1240 ml


 


Output Total 450 ml


 


Balance 790 ml


 


 


 


Intake Oral 1240 ml


 


Output Urine Total 450 ml


 


# Bowel Movements 2











Current Medications:


Meds:





Current Medications


Acetaminophen (Tylenol) 650 mg PRN Q6HRS  PRN PO PAIN / TEMP;  Start 5/26/18 at 

21:15


Multi-Ingredient Ointment (Analgesic Balm) 1 bob PRN QID  PRN TP MUSCLE PAIN;  

Start 5/26/18 at 21:15


Al Hydroxide/Mg Hydroxide (Mylanta Plus Xs) 15 ml PRN AFTMEALHC  PRN PO 

DYSPEPSIA Last administered on 5/27/18at 03:43;  Start 5/26/18 at 21:15


Magnesium Hydroxide (Milk Of Magnesia) 2,400 mg PRN QHS  PRN PO CONSTIPATION;  

Start 5/26/18 at 21:15


Mirtazapine (Remeron) 7.5 mg QHS PO  Last administered on 5/28/18at 19:41;  

Start 5/26/18 at 22:00


Olanzapine (ZyPREXA ZYDIS) 5 mg PRN Q2HR  PRN PO PSYCHOSIS;  Start 5/26/18 at 21

:15


Baclofen (Lioresal) 10 mg BID PO  Last administered on 5/28/18at 19:41;  Start 5 /26/18 at 22:00


Lithium Carbonate 150 mg DAILY PO  Last administered on 5/28/18at 07:59;  Start 

5/27/18 at 09:00


Lithium Carbonate 300 mg QHS PO  Last administered on 5/28/18at 19:41;  Start 5/ 26/18 at 22:00


Lorazepam (Ativan) 0.5 mg PRN TID  PRN PO ANXIETY / AGITATION;  Start 5/26/18 

at 21:30


Risperidone (RisperDAL) 1.5 mg QHS PO  Last administered on 5/28/18at 19:42;  

Start 5/26/18 at 22:00


Trazodone HCl (Desyrel) 50 mg PRN QHS  PRN PO INSOMNIA, MAY REPEAT X1;  Start 5/ 26/18 at 21:30


Vitamin D (Vitamin D3) 5,000 unit DAILY PO  Last administered on 5/28/18at 07:58

;  Start 5/27/18 at 09:00


Multivitamins/ Calcium (Thera-M Plus) 1 tab DAILY PO  Last administered on 5/28/ 18at 07:59;  Start 5/27/18 at 09:00


Neomycin/ Polymyxin/ Bacitracin (Triple Antibiotic Ointment) 1 pkt PRN DAILY  

PRN TP Wound Healing;  Start 5/26/18 at 21:15


Simethicone (Gas-X) 80 mg PRN AFTMEALHC  PRN PO GAS / BLOATING;  Start 5/26/18 

at 21:15


Ascorbic Acid (Vitamin C) 500 mg DAILY PO  Last administered on 5/28/18at 08:00

;  Start 5/27/18 at 09:00


Bisacodyl (Dulcolax Supp) 10 mg PRN DAILY  PRN WI CONSTIPATION;  Start 5/26/18 

at 21:30


Cetirizine HCl (ZyrTEC) 10 mg DAILY PO  Last administered on 5/28/18at 07:58;  

Start 5/27/18 at 09:00


Docusate Sodium (Colace) 100 mg PRN BID  PRN PO CONSTIPATION;  Start 5/26/18 at 

21:30


Famotidine (Pepcid) 20 mg BIDBFRMEAL PO  Last administered on 5/27/18at 07:49;  

Start 5/27/18 at 07:30;  Stop 5/27/18 at 07:53;  Status DC


Finasteride (Proscar) 5 mg DAILY PO  Last administered on 5/28/18at 07:58;  

Start 5/27/18 at 09:00


Lactobacillus Rhamnosus (Culturelle) 1 cap BID PO  Last administered on 5/28/ 18at 19:42;  Start 5/26/18 at 22:00


Oxycodone HCl (Roxicodone) 5 mg PRN Q6HRS  PRN PO PAIN Last administered on 5/28 /18at 19:43;  Start 5/26/18 at 21:30


Polyethylene Glycol (miraLAX) 17 gm BID PO  Last administered on 5/28/18at 19:40

;  Start 5/26/18 at 22:00


Sennosides (Senna) 8.6 mg BID PO  Last administered on 5/28/18at 19:41;  Start 5 /26/18 at 22:00


Terazosin HCl (Hytrin) 10 mg QHS PO  Last administered on 5/28/18at 19:41;  

Start 5/26/18 at 22:00


Ondansetron HCl (Zofran Odt) 4 mg PRN Q8HRS  PRN PO NAUSEA/VOMITING;  Start 5/26 /18 at 21:30


Famotidine (Pepcid) 20 mg BID PO  Last administered on 5/28/18at 19:41;  Start 5 /27/18 at 21:00


Levothyroxine Sodium (Synthroid) 50 mcg DAILY06 PO  Last administered on 5/28/ 18at 05:03;  Start 5/28/18 at 06:00





Active Scripts


Active


Reported


Zofran Odt (Ondansetron) 4 Mg Tab.rapdis 4 Mg PO PRN Q6HRS PRN


Simethicone 80 Mg Tab.chew 80 Mg PO PRN AFTMEALHC PRN


Thera M Plus Tablet (Multivits,Ca,Minerals/Iron/FA) 1 Each Tablet 1 Tab PO DAILY


Mirtazapine 7.5 Mg Tablet 7.5 Mg PO QHS


Milk Of Magnesia (Magnesium Hydroxide) 400 Mg/5 Ml Oral.susp 2,400 Mg PO PRN 

DAILY PRN


Culturelle (Lactobacillus Rhamnosus Gg) 1 Each Capsule 1 Each PO BID


Vitamin D3 (Cholecalciferol (Vitamin D3)) 1,000 Unit Tablet 5,000 Unit PO DAILY


Zyrtec (Cetirizine Hcl) 10 Mg Tablet 10 Mg PO DAILY


Vitamin C (Ascorbate Calcium) 500 Mg Tablet 500 Mg PO DAILY


Trazodone Hcl 50 Mg Tablet 50 Mg PO PRN QHS PRN


Risperdal (Risperidone) 1 Mg Tablet 1.5 Mg PO QHS


Zyprexa Zydis (Olanzapine) 5 Mg Tab.rapdis 5 Mg PO PRN Q2HR PRN


Lithium Carbonate 150 Mg Capsule 150 Mg PO DAILY


Miralax (Polyethylene Glycol 3350) 17 Gm Powd.pack 17 Gm PO BID


Triple Antibiotic Ointment (Neomy Sulf/Bacitra/Polymyxin B) 1 Each Packet 1 

Packet TP PRN DAILY PRN


Oxycodone Hcl 5 Mg Capsule 5 Mg PO PRN Q6HRS PRN


Lorazepam 0.5 Mg Tablet 0.5 Mg PO TID PRN PRN


Famotidine 20 Mg Tablet 20 Mg PO BIDBFRMEAL


Terazosin Hcl 10 Mg Capsule 10 Mg PO DAILY


Lithium Carbonate 300 Mg Capsule 300 Mg PO HS


Baclofen 10 Mg Tablet 10 Mg PO BID


Maalox Maximum Strength Susp (Mag Hydrox/Al Hydrox/Simeth) 355 Ml Oral.susp 30 

Ml PO PRN Q2HR PRN


Finasteride 5 Mg Tablet 5 Mg PO DAILY


Colace (Docusate Sodium) 100 Mg Capsule 100 Mg PO PRN BID PRN


Senokot (Sennosides) 8.6 Mg Tablet 8.6 Mg PO BID


Bisacodyl 10 Mg Supp.rect 10 Mg RC PRN DAILY PRN


Tylenol (Acetaminophen) 325 Mg Tablet 650 Mg PO PRN Q6HRS PRN


I have reviewed the current psychotropics carefully including drug 

interactions.  Risk benefit ratio favors no change other than as noted in my 

dictated progress note.





Diagnosis:


Problems:  


(1) Anxiety disorder


(2) Bipolar affective, mixed, sev w/ psych


(3) Dementia, vascular, with delusions


(4) Mild cognitive impairment


(5) Osteomyelitis of hand, left, acute


(6) Quadriplegia











DIANA STALLINGS MD May 28, 2018 20:33

## 2018-05-28 NOTE — NUR
Behavior Intervention Response and Plan:

BIRP Note:

Behavior: Assumed Care of patient, patient located in Day Room at shift change.  Patient 
exhibited the following behavior Interactive, Calm, Compliant.  Brief assessment on rounds 
of vital signs, medication needs, lab studies, and pain.  Treatment plan problems .



Intervention: Patient assessed and the following interventions initiated safety checks 15 
Minute Checks Cognitive Assessment , Head to toe Assessment , Medications.



Response: After interactions and interventions patient responded in the following manner, 
Interactive , Calm ,Compliant.  Continue to assess behaviors and condition will continue to 
monitor throughout the shift as needed. Patient educated on ADL's, and hand hygiene.



Plan: Continue to monitor Master Treatment Plan for patient's progress toward short term 
goals of Decreased Anxiety, No harm To self/ others, long term goals to return to previous 
living setting vs placement.   Continue to assess patient for changes in above assessment.  
Monitor for medication needs, pain, and safety concerns.  Hourly rounding performed to 
ensure safe environment.

## 2018-05-29 VITALS — DIASTOLIC BLOOD PRESSURE: 69 MMHG | SYSTOLIC BLOOD PRESSURE: 118 MMHG

## 2018-05-29 VITALS — SYSTOLIC BLOOD PRESSURE: 154 MMHG | DIASTOLIC BLOOD PRESSURE: 81 MMHG

## 2018-05-29 LAB — T3 SERPL-MCNC: 72 NG/DL (ref 71–180)

## 2018-05-29 RX ADMIN — OXYCODONE HYDROCHLORIDE AND ACETAMINOPHEN SCH MG: 500 TABLET ORAL at 07:55

## 2018-05-29 RX ADMIN — CETIRIZINE HYDROCHLORIDE SCH MG: 10 TABLET, FILM COATED ORAL at 07:55

## 2018-05-29 RX ADMIN — Medication SCH CAP: at 19:23

## 2018-05-29 RX ADMIN — MULTIPLE VITAMINS W/ MINERALS TAB SCH TAB: TAB at 07:55

## 2018-05-29 RX ADMIN — FAMOTIDINE SCH MG: 20 TABLET ORAL at 19:23

## 2018-05-29 RX ADMIN — LEVOTHYROXINE SODIUM SCH MCG: 50 TABLET ORAL at 05:54

## 2018-05-29 RX ADMIN — LITHIUM CARBONATE SCH MG: 150 CAPSULE, GELATIN COATED ORAL at 19:25

## 2018-05-29 RX ADMIN — TERAZOSIN HYDROCHLORIDE SCH MG: 5 CAPSULE ORAL at 19:23

## 2018-05-29 RX ADMIN — POLYETHYLENE GLYCOL 3350 SCH GM: 17 POWDER, FOR SOLUTION ORAL at 07:56

## 2018-05-29 RX ADMIN — MIRTAZAPINE SCH MG: 7.5 TABLET, FILM COATED ORAL at 19:23

## 2018-05-29 RX ADMIN — BACLOFEN SCH MG: 10 TABLET ORAL at 07:55

## 2018-05-29 RX ADMIN — ACETAMINOPHEN PRN MG: 325 TABLET, FILM COATED ORAL at 12:29

## 2018-05-29 RX ADMIN — POLYETHYLENE GLYCOL 3350 SCH GM: 17 POWDER, FOR SOLUTION ORAL at 19:22

## 2018-05-29 RX ADMIN — Medication SCH CAP: at 07:55

## 2018-05-29 RX ADMIN — LITHIUM CARBONATE SCH MG: 150 CAPSULE, GELATIN COATED ORAL at 07:55

## 2018-05-29 RX ADMIN — RISPERIDONE SCH MG: 0.5 TABLET ORAL at 19:23

## 2018-05-29 RX ADMIN — SENNOSIDES A AND B SCH MG: 8.6 TABLET, FILM COATED ORAL at 19:25

## 2018-05-29 RX ADMIN — VITAMIN D, TAB 1000IU (100/BT) SCH UNIT: 25 TAB at 07:54

## 2018-05-29 RX ADMIN — FAMOTIDINE SCH MG: 20 TABLET ORAL at 07:55

## 2018-05-29 RX ADMIN — BACLOFEN SCH MG: 10 TABLET ORAL at 19:23

## 2018-05-29 RX ADMIN — SENNOSIDES A AND B SCH MG: 8.6 TABLET, FILM COATED ORAL at 07:55

## 2018-05-29 RX ADMIN — FINASTERIDE SCH MG: 5 TABLET, FILM COATED ORAL at 07:55

## 2018-05-29 NOTE — PDOC
Exam


Note:


Shai Note:


Please also refer to the separate dictated note~for this date of service 

dictated separately.~Patient seen individually. Discussed the patient with 

Nursing staff reviewed the chart.~Reviewed interim history and current 

functioning. Reviewed vital signs,~Labs/ Radiology~and current medications 

noted below. Continue current treatment with the changes noted in the dictated 

addendum note





Assessment:


Vital Signs:





 Vital Signs








  Date Time  Temp Pulse Resp B/P (MAP) Pulse Ox O2 Delivery O2 Flow Rate FiO2


 


5/29/18 20:26   16  99 Room Air  


 


5/29/18 19:23  68  154/81    


 


5/29/18 16:09 97.8       








I&O











Intake and Output 


 


 5/29/18





 07:00


 


Intake Total 1920 ml


 


Balance 1920 ml


 


 


 


Intake Oral 1920 ml


 


# Bowel Movements 1











Current Medications:


Meds:





Current Medications


Acetaminophen (Tylenol) 650 mg PRN Q6HRS  PRN PO PAIN / TEMP Last administered 

on 5/29/18at 12:29;  Start 5/26/18 at 21:15


Multi-Ingredient Ointment (Analgesic Balm) 1 bob PRN QID  PRN TP MUSCLE PAIN;  

Start 5/26/18 at 21:15


Al Hydroxide/Mg Hydroxide (Mylanta Plus Xs) 15 ml PRN AFTMEALHC  PRN PO 

DYSPEPSIA Last administered on 5/27/18at 03:43;  Start 5/26/18 at 21:15


Magnesium Hydroxide (Milk Of Magnesia) 2,400 mg PRN QHS  PRN PO CONSTIPATION;  

Start 5/26/18 at 21:15


Mirtazapine (Remeron) 7.5 mg QHS PO  Last administered on 5/29/18at 19:23;  

Start 5/26/18 at 22:00


Olanzapine (ZyPREXA ZYDIS) 5 mg PRN Q2HR  PRN PO PSYCHOSIS;  Start 5/26/18 at 21

:15


Baclofen (Lioresal) 10 mg BID PO  Last administered on 5/29/18at 19:23;  Start 5 /26/18 at 22:00


Lithium Carbonate 150 mg DAILY PO  Last administered on 5/29/18at 07:55;  Start 

5/27/18 at 09:00


Lithium Carbonate 300 mg QHS PO  Last administered on 5/29/18at 19:25;  Start 5/ 26/18 at 22:00


Lorazepam (Ativan) 0.5 mg PRN TID  PRN PO ANXIETY / AGITATION;  Start 5/26/18 

at 21:30


Risperidone (RisperDAL) 1.5 mg QHS PO  Last administered on 5/29/18at 19:23;  

Start 5/26/18 at 22:00


Trazodone HCl (Desyrel) 50 mg PRN QHS  PRN PO INSOMNIA, MAY REPEAT X1;  Start 5/ 26/18 at 21:30


Vitamin D (Vitamin D3) 5,000 unit DAILY PO  Last administered on 5/29/18at 07:54

;  Start 5/27/18 at 09:00;  Stop 5/29/18 at 15:34;  Status DC


Multivitamins/ Calcium (Thera-M Plus) 1 tab DAILY PO  Last administered on 5/29/ 18at 07:55;  Start 5/27/18 at 09:00


Neomycin/ Polymyxin/ Bacitracin (Triple Antibiotic Ointment) 1 pkt PRN DAILY  

PRN TP Wound Healing;  Start 5/26/18 at 21:15


Simethicone (Gas-X) 80 mg PRN AFTMEALHC  PRN PO GAS / BLOATING;  Start 5/26/18 

at 21:15


Ascorbic Acid (Vitamin C) 500 mg DAILY PO  Last administered on 5/29/18at 07:55

;  Start 5/27/18 at 09:00


Bisacodyl (Dulcolax Supp) 10 mg PRN DAILY  PRN CA CONSTIPATION;  Start 5/26/18 

at 21:30


Cetirizine HCl (ZyrTEC) 10 mg DAILY PO  Last administered on 5/29/18at 07:55;  

Start 5/27/18 at 09:00


Docusate Sodium (Colace) 100 mg PRN BID  PRN PO CONSTIPATION;  Start 5/26/18 at 

21:30


Famotidine (Pepcid) 20 mg BIDBFRMEAL PO  Last administered on 5/27/18at 07:49;  

Start 5/27/18 at 07:30;  Stop 5/27/18 at 07:53;  Status DC


Finasteride (Proscar) 5 mg DAILY PO  Last administered on 5/29/18at 07:55;  

Start 5/27/18 at 09:00


Lactobacillus Rhamnosus (Culturelle) 1 cap BID PO  Last administered on 5/29/ 18at 19:23;  Start 5/26/18 at 22:00


Oxycodone HCl (Roxicodone) 5 mg PRN Q6HRS  PRN PO PAIN Last administered on 5/29 /18at 19:26;  Start 5/26/18 at 21:30


Polyethylene Glycol (miraLAX) 17 gm BID PO  Last administered on 5/29/18at 19:22

;  Start 5/26/18 at 22:00


Sennosides (Senna) 8.6 mg BID PO  Last administered on 5/29/18at 19:25;  Start 5 /26/18 at 22:00


Terazosin HCl (Hytrin) 10 mg QHS PO  Last administered on 5/29/18at 19:23;  

Start 5/26/18 at 22:00


Ondansetron HCl (Zofran Odt) 4 mg PRN Q8HRS  PRN PO NAUSEA/VOMITING;  Start 5/26 /18 at 21:30


Famotidine (Pepcid) 20 mg BID PO  Last administered on 5/29/18at 19:23;  Start 5 /27/18 at 21:00


Levothyroxine Sodium (Synthroid) 50 mcg DAILY06 PO  Last administered on 5/29/ 18at 05:54;  Start 5/28/18 at 06:00


Vitamin D (Vitamin D3) 50,000 unit WEEKLY PO ;  Start 5/30/18 at 09:00





Active Scripts


Active


Reported


Zofran Odt (Ondansetron) 4 Mg Tab.rapdis 4 Mg PO PRN Q6HRS PRN


Simethicone 80 Mg Tab.chew 80 Mg PO PRN AFTMEALHC PRN


Thera M Plus Tablet (Multivits,Ca,Minerals/Iron/FA) 1 Each Tablet 1 Tab PO DAILY


Mirtazapine 7.5 Mg Tablet 7.5 Mg PO QHS


Milk Of Magnesia (Magnesium Hydroxide) 400 Mg/5 Ml Oral.susp 2,400 Mg PO PRN 

DAILY PRN


Culturelle (Lactobacillus Rhamnosus Gg) 1 Each Capsule 1 Each PO BID


Vitamin D3 (Cholecalciferol (Vitamin D3)) 1,000 Unit Tablet 5,000 Unit PO DAILY


Zyrtec (Cetirizine Hcl) 10 Mg Tablet 10 Mg PO DAILY


Vitamin C (Ascorbate Calcium) 500 Mg Tablet 500 Mg PO DAILY


Trazodone Hcl 50 Mg Tablet 50 Mg PO PRN QHS PRN


Risperdal (Risperidone) 1 Mg Tablet 1.5 Mg PO QHS


Zyprexa Zydis (Olanzapine) 5 Mg Tab.rapdis 5 Mg PO PRN Q2HR PRN


Lithium Carbonate 150 Mg Capsule 150 Mg PO DAILY


Miralax (Polyethylene Glycol 3350) 17 Gm Powd.pack 17 Gm PO BID


Triple Antibiotic Ointment (Neomy Sulf/Bacitra/Polymyxin B) 1 Each Packet 1 

Packet TP PRN DAILY PRN


Oxycodone Hcl 5 Mg Capsule 5 Mg PO PRN Q6HRS PRN


Lorazepam 0.5 Mg Tablet 0.5 Mg PO TID PRN PRN


Famotidine 20 Mg Tablet 20 Mg PO BIDBFRMEAL


Terazosin Hcl 10 Mg Capsule 10 Mg PO DAILY


Lithium Carbonate 300 Mg Capsule 300 Mg PO HS


Baclofen 10 Mg Tablet 10 Mg PO BID


Maalox Maximum Strength Susp (Mag Hydrox/Al Hydrox/Simeth) 355 Ml Oral.susp 30 

Ml PO PRN Q2HR PRN


Finasteride 5 Mg Tablet 5 Mg PO DAILY


Colace (Docusate Sodium) 100 Mg Capsule 100 Mg PO PRN BID PRN


Senokot (Sennosides) 8.6 Mg Tablet 8.6 Mg PO BID


Bisacodyl 10 Mg Supp.rect 10 Mg RC PRN DAILY PRN


Tylenol (Acetaminophen) 325 Mg Tablet 650 Mg PO PRN Q6HRS PRN


I have reviewed the current psychotropics carefully including drug 

interactions.  Risk benefit ratio favors no change other than as noted in my 

dictated progress note.





Diagnosis:


Problems:  


(1) Anxiety disorder


(2) Bipolar affective, mixed, sev w/ psych


(3) Dementia, vascular, with delusions


(4) Mild cognitive impairment


(5) Osteomyelitis of hand, left, acute


(6) Quadriplegia











DIANA STALLINGS MD May 29, 2018 20:37

## 2018-05-29 NOTE — NUR
SW requested Level II eval to be completed as this is pt's 2nd admit to a psych facility in 
less then 2 years (both to this facility in same stay, just dc to med floor then readmit.).

## 2018-05-29 NOTE — NUR
Psychosocial Assessment completed w/pt's wife, Remington during previous admit on 5/5/18.



Pt was born and raised in [place], [place] w/1 brother.  Pt's mother was diagnosed with Bipolar 
and his father had an undiagnosed MHI.  Pt completed HS and attended a [employer] 
for typing.  Pt joined the [employer], but was injured during bootcamp, so he was not able to 
serve.  Pt  his current wife named Remington for 44 years.  They do not have any 
children.  Pt is very close w/his brother, Alli.  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  Pt applied for 
disability, but felt extreme guilt and suffered from depression.  [facility] who he continues to see outpatient.  Pt 
struggled w/Bipolar throughout adult life, and according to Remington, is the reason they 
chose not to have children.  Pt's mental health was managed well prior to early assisted, 
and then back on track after [facility].  No history of SI or additional 
Psych hospital admits.  Winter 2017, pt was sick, Remington was pt's primary caretaker.  Pt 
became weak, suffered from a UTI and admitted to [place], dc to [facility] then was 
transitioned to [facility] in [place].  Pt was only there for 3 days as 
he continued to have increased agitation, aggression, hallucinations, mood lability.  Pt 
then was taken to [facility] ED and came to this facility.



Pt has no history of alcohol or substance abuse. 



GOALS:

Pt is 80% [facility] connected and is in the process of obtaining placement in a LTC[facility] w/[facility] contract 
for payment. 



1.  Family support

2.  Community Support

## 2018-05-29 NOTE — NUR
Wound Care

Wound care consult for buttock pressure ulcer. Pt has stage III to bilateral buttocks. Pt 
also has surgical incision to distal portion of 5th finger. Cleansed and measured wounds. 
Applied calazime to buttocks. Pt is on P500 bed and should be left to right turns only. 
Applied aquacel ag and hydrocolloid to finger as pot stated his gauze dressing keeps falling 
off. No other wounds found on skin inspection. Pt turned to the left side at this time. 
Floated heels and educated pt on pressure ulcer prevention. WC will continue to follow for 
possible changes.

## 2018-05-29 NOTE — NUR
P500 bed delivered for patient.  Patient transferred to new bed without complaints.  Will 
continue to monitor.

## 2018-05-29 NOTE — NUR
INSURANCE INFORMATION



AYESHA contacted Pt's "[employer] Insurance as it is listed as MCO @ [phone] provided on [facility] 
CSNAP sheet.  AYESHA spoke w/option #2 who stated pt's RR is actually a RR SUPPLEMENT and will 
be considered a secondary to Medicare.  There is not authorization required as Medicare is 
primary payor source and AYESHA verified Medicare will cover Psych benefits for pt.

## 2018-05-29 NOTE — PN
DATE:  05/27/2018



ADDENDUM



SUBJECTIVE:  The patient is a 72-year-old  male patient, who was

transferred to [facility] as he has infected broken left fifth

finger and in consultation with Dr. Gamez, he recommended surgical amputation

is the best option available and after the amputation was done Dr. Gamez did

not recommend any long-term antibiotic therapy and as the patient remained

stable, but continued to have behavioral problems.  He was [facility] to continue inpatient psychiatric stabilization as he was

acting as he was driving racing car claiming that the feet are burning because

of ____ car and was very excited, agitated, yelling while at [facility].



PAST MEDICAL HISTORY:  Significant for hypothyroidism, benign prostatic

hypertrophy, chronic constipation, cervical myelopathy with quadriplegia as well

as neurogenic bladder requiring indwelling Gordon catheter, also acute on-chronic

kidney injury because of bladder outlet obstruction.



PAST PSYCHIATRIC HISTORY:  Significant for having bipolar disorder.  There is

racing thoughts alternating with depression, has significant insomnia and

worsening mood swings, worsening confusion.



FAMILY HISTORY:  Noncontributory.



SOCIAL HISTORY:  He is a resident at [facility], retired from the railroad. 

He apparently has been at [facility] for a while.  He does not smoke, drink

alcohol or use any recreational drugs.



OBJECTIVE:

GENERAL:  On examining him today, he looked well and was clearly in no apparent

respiratory distress, pale, but no jaundice, cyanosis, or thyromegaly.  No

jugular venous distension.  No lower limb edema.

VITAL SIGNS:  His heart rate was 66, blood pressure 118/79, temperature was

97.8, respiratory rate 20, and oxygen saturation was 98%.  The rest of clinical

exam is stable.

HEAD, EYES, EARS, NOSE, AND THROAT:  Showed normocephalic, atraumatic.

NECK:  Supple.

HEART:  Showed normal first and second heart sounds with no gallop, rub, or

murmur.

CHEST:  Clear to auscultation.  No crepitation or rhonchi.

ABDOMEN:  Distended, soft, nontender.

NEUROLOGIC:  He is awake, alert, but confused.  All cranial nerves intact.  He

has quadriplegia with marked muscle wasting and fixed flexion contraction of all

4 limbs.  Has neurogenic bladder with bladder outlet obstruction requiring

indwelling Gordon catheter.



LABORATORY DATA:  His lab work this morning showed a white cell count of 7,400,

hemoglobin 13, hematocrit 41, MCV 92, and platelet count 236,000.  His chemistry

showed a serum sodium 143, potassium 3.8, chloride 107, bicarbonate 28, anion

gap of 8, BUN 15, creatinine 1.2, estimated GFR was 59 mL per minute.  His

glucose was 129, calcium was 8.7, magnesium 2.6.  Serum iron was 45, TIBC was

205, and percent saturation was ____.  His total bilirubin, AST, ALT, alkaline

phosphatase were normal.  Total protein 7.8, albumin 2.6.  Serum triglycerides

were 85.7, total cholesterol 143, LDL was 94, VLDL was 17, HDL was 32, and the

ratio was 4.  His TSH was 25.736.



He is currently on following medication, famotidine 20 mg twice a day,

finasteride 5 mg once a day, cetirizine 10 mg once a day, ascorbic acid 500 mg

daily, multivitamin 1 tablet once a day, vitamin D 5,000 international units as

once a day, lithium carbonate 150 mg daily, terazosin 10 mg at bedtime, senna

8.6 once a day, polyethylene glycol 17 grams once b.i.d., Lactobacillus

rhamnosus 1 capsule twice a day, risperidone 1.5 mg at bedtime, lithium

carbonate 300 mg at bedtime, baclofen 10 mg twice a day, mirtazapine 7.5 mg at

bedtime, ondansetron 4 mg every 6 hours, oxycodone 5 mg every 6 hours, Docusate

sodium 100 mg twice a day, bisacodyl 10 mg rectally as needed for constipation,

trazodone 50 mg at bedtime, lorazepam 0.5 mg 2 times a day, and simethicone 80

mg after meals, olanzapine 5 mg every 2 hours.  The patient is not on any

Synthroid.  I will start him on levothyroxine 50 mcg.  We will check his free

T3, free T4 and will also start him on 50 mcg and monitor his response.



Thank you, Dr. Dias for allowing me to participate in the care of this patient.





______________________________

SAMIR SCOTT MD



DR:  SHAWN/timbo  JOB#:  [id number] / [id number]

DD:  05/27/2018 17:22  DT:  05/29/2018 01:35

## 2018-05-29 NOTE — NUR
ACTIVITY THERAPY ASSESSMENT 

Completed based on observation and interview. Pt. was shouting out for water from his bed 
while listening to music from a [employer]. CTRS gave him a drink of water and Pt. was 
appreciative. He was talkative and able to carry a conversation. Pt. enjoys music and wanted 
to sing for the group. He became slightly tearful when talking about the emilia he gets when 
putting a smile on other's faces. Pt. was able to remember his wife, and is able to express 
his needs clearly. His left hand had a few fingers that were wrapped and he has little 
functioning of hands and feet. He usually is in a Broda wheelchair and is only allowed to be 
up for an hour. Pt. likes to sing along and listen to music all day. Initial goal aimed to 
increase stimulation: Pt. will participate in at least one group a day.

## 2018-05-29 NOTE — NUR
Behavior Intervention Response and Plan:

BIRP Note:

Behavior: Assumed Care of patient, patient located in Patient Room at shift change.  Patient 
exhibited the following behavior Calm, Interactive, Social.  Brief assessment on rounds of 
vital signs, medication needs, lab studies, and pain.  Treatment plan problems 1 and 2.



Intervention: Patient assessed and the following interventions initiated safety checks 15 
Minute Checks Cognitive Assessment , Head to toe Assessment , Medications.



Response: After interactions and interventions patient responded in the following manner, 
Calm , Compliant ,Cooperative.  Continue to assess behaviors and condition will continue to 
monitor throughout the shift as needed. Patient educated on ADL's, and hand hygiene.



Plan: Continue to monitor Master Treatment Plan for patient's progress toward short term 
goals of Improved Mood, Decreased Anxiety, long term goals to return to previous living 
setting vs placement.   Continue to assess patient for changes in above assessment.  Monitor 
for medication needs, pain, and safety concerns.  Hourly rounding performed to ensure safe 
environment.

## 2018-05-29 NOTE — NUR
Behavior Intervention Response and Plan:

BIRP Note:

Behavior: Assumed Care of patient, patient located in Patient Room at shift change.  Patient 
exhibited the following behavior Restless, Interactive, Disorganized.  Brief assessment on 
rounds of vital signs, medication needs, lab studies, and pain.  Treatment plan problems 1 & 
2.



Intervention: Patient assessed and the following interventions initiated safety checks 15 
Minute Checks Cognitive Assessment , Head to toe Assessment , Medications.



Response: After interactions and interventions patient responded in the following manner, 
Calm , Appropriate ,Compliant.  Continue to assess behaviors and condition will continue to 
monitor throughout the shift as needed. Patient educated on ADL's, and hand hygiene.



Plan: Continue to monitor Master Treatment Plan for patient's progress toward short term 
goals of Medication Compliance, No harm To self/ others, long term goals to return to 
previous living setting vs placement.   Continue to assess patient for changes in above 
assessment.  Monitor for medication needs, pain, and safety concerns.  Hourly rounding 
performed to ensure safe environment.

## 2018-05-29 NOTE — NUR
AYESHA contacted [facility]rade [facility] regarding Pt's [facility] benefit for LTC coverage.  Pt is 
80% connected and qualifies for LTC coverage.  Pt does not have PCP thru the [facility], but 
consistently saw a psychiatrist.  Jacque is attempting to get the Chief Medical Officer to 
bypass the PCP requirement to enable pt to receive the [facility] contract for LTC.  This SW will 
continue to send updated clinicals to prove the need for LTC and Jacque will submit to the 
Chief MO for review.  



Possible LTCF that accept VA contracts:  [facility], [facility], [facility], 
[facility] [address].

## 2018-05-30 VITALS — DIASTOLIC BLOOD PRESSURE: 60 MMHG | SYSTOLIC BLOOD PRESSURE: 107 MMHG

## 2018-05-30 VITALS — DIASTOLIC BLOOD PRESSURE: 65 MMHG | SYSTOLIC BLOOD PRESSURE: 103 MMHG

## 2018-05-30 VITALS — DIASTOLIC BLOOD PRESSURE: 71 MMHG | SYSTOLIC BLOOD PRESSURE: 128 MMHG

## 2018-05-30 RX ADMIN — TERAZOSIN HYDROCHLORIDE SCH MG: 5 CAPSULE ORAL at 20:39

## 2018-05-30 RX ADMIN — CETIRIZINE HYDROCHLORIDE SCH MG: 10 TABLET, FILM COATED ORAL at 08:48

## 2018-05-30 RX ADMIN — FINASTERIDE SCH MG: 5 TABLET, FILM COATED ORAL at 08:47

## 2018-05-30 RX ADMIN — CHOLECALCIFEROL CAP 1.25 MG (50000 UNIT) SCH UNIT: 1.25 CAP at 08:48

## 2018-05-30 RX ADMIN — BACLOFEN SCH MG: 10 TABLET ORAL at 20:35

## 2018-05-30 RX ADMIN — POLYETHYLENE GLYCOL 3350 SCH GM: 17 POWDER, FOR SOLUTION ORAL at 20:36

## 2018-05-30 RX ADMIN — MULTIPLE VITAMINS W/ MINERALS TAB SCH TAB: TAB at 08:47

## 2018-05-30 RX ADMIN — LITHIUM CARBONATE SCH MG: 150 CAPSULE, GELATIN COATED ORAL at 08:48

## 2018-05-30 RX ADMIN — SENNOSIDES A AND B SCH MG: 8.6 TABLET, FILM COATED ORAL at 20:36

## 2018-05-30 RX ADMIN — Medication SCH CAP: at 20:35

## 2018-05-30 RX ADMIN — BACLOFEN SCH MG: 10 TABLET ORAL at 08:47

## 2018-05-30 RX ADMIN — POLYETHYLENE GLYCOL 3350 SCH GM: 17 POWDER, FOR SOLUTION ORAL at 08:48

## 2018-05-30 RX ADMIN — SENNOSIDES A AND B SCH MG: 8.6 TABLET, FILM COATED ORAL at 08:47

## 2018-05-30 RX ADMIN — RISPERIDONE SCH MG: 0.5 TABLET ORAL at 20:35

## 2018-05-30 RX ADMIN — OXYCODONE HYDROCHLORIDE AND ACETAMINOPHEN SCH MG: 500 TABLET ORAL at 08:47

## 2018-05-30 RX ADMIN — FAMOTIDINE SCH MG: 20 TABLET ORAL at 20:35

## 2018-05-30 RX ADMIN — LEVOTHYROXINE SODIUM SCH MCG: 50 TABLET ORAL at 05:41

## 2018-05-30 RX ADMIN — Medication SCH CAP: at 08:47

## 2018-05-30 RX ADMIN — FAMOTIDINE SCH MG: 20 TABLET ORAL at 08:47

## 2018-05-30 RX ADMIN — MIRTAZAPINE SCH MG: 7.5 TABLET, FILM COATED ORAL at 20:35

## 2018-05-30 RX ADMIN — ACETAMINOPHEN PRN MG: 325 TABLET, FILM COATED ORAL at 08:48

## 2018-05-30 RX ADMIN — LITHIUM CARBONATE SCH MG: 150 CAPSULE, GELATIN COATED ORAL at 20:35

## 2018-05-30 NOTE — NUR
WEEKLY THERAPEUTIC RECREATION NOTE

Date of Admission: 5/26/2018

Date of AT Assessment: 5/29/2018

Goal aimed:  to increase stimulation

Initial goal: Pt. will participate in at least one group a day. 

Weekly progress towards goal: on track

Group participation level:  moderate

Behaviors observed: singing music from his room, wanted to sing for group-karaoke on 
Wednesday, more alert, pleasant

Plan: no changes to goal

## 2018-05-30 NOTE — PDOC
Exam


Note:


Shai Note:


Please also refer to the separate dictated note~for this date of service 

dictated separately.~Patient seen individually. Discussed the patient with 

Nursing staff reviewed the chart.~Reviewed interim history and current 

functioning. Reviewed vital signs,~Labs/ Radiology~and current medications 

noted below. Continue current treatment with the changes noted in the dictated 

addendum note





Assessment:


Vital Signs:





 Vital Signs








  Date Time  Temp Pulse Resp B/P (MAP) Pulse Ox O2 Delivery O2 Flow Rate FiO2


 


5/30/18 20:43 98.3 67 18 107/60 (76) 97 Room Air  








I&O











Intake and Output 


 


 5/30/18





 07:00


 


Intake Total 2040 ml


 


Output Total 1500 ml


 


Balance 540 ml


 


 


 


Intake Oral 2040 ml


 


Output Urine Total 1500 ml











Current Medications:


Meds:





Current Medications


Acetaminophen (Tylenol) 650 mg PRN Q6HRS  PRN PO PAIN / TEMP Last administered 

on 5/30/18at 08:48;  Start 5/26/18 at 21:15


Multi-Ingredient Ointment (Analgesic Balm) 1 bob PRN QID  PRN TP MUSCLE PAIN;  

Start 5/26/18 at 21:15


Al Hydroxide/Mg Hydroxide (Mylanta Plus Xs) 15 ml PRN AFTMEALHC  PRN PO 

DYSPEPSIA Last administered on 5/27/18at 03:43;  Start 5/26/18 at 21:15


Magnesium Hydroxide (Milk Of Magnesia) 2,400 mg PRN QHS  PRN PO CONSTIPATION;  

Start 5/26/18 at 21:15


Mirtazapine (Remeron) 7.5 mg QHS PO  Last administered on 5/30/18at 20:35;  

Start 5/26/18 at 22:00


Olanzapine (ZyPREXA ZYDIS) 5 mg PRN Q2HR  PRN PO PSYCHOSIS;  Start 5/26/18 at 21

:15


Baclofen (Lioresal) 10 mg BID PO  Last administered on 5/30/18at 20:35;  Start 5 /26/18 at 22:00


Lithium Carbonate 150 mg DAILY PO  Last administered on 5/30/18at 08:48;  Start 

5/27/18 at 09:00


Lithium Carbonate 300 mg QHS PO  Last administered on 5/30/18at 20:35;  Start 5/ 26/18 at 22:00


Lorazepam (Ativan) 0.5 mg PRN TID  PRN PO ANXIETY / AGITATION;  Start 5/26/18 

at 21:30


Risperidone (RisperDAL) 1.5 mg QHS PO  Last administered on 5/30/18at 20:35;  

Start 5/26/18 at 22:00


Trazodone HCl (Desyrel) 50 mg PRN QHS  PRN PO INSOMNIA, MAY REPEAT X1;  Start 5/ 26/18 at 21:30


Vitamin D (Vitamin D3) 5,000 unit DAILY PO  Last administered on 5/29/18at 07:54

;  Start 5/27/18 at 09:00;  Stop 5/29/18 at 15:34;  Status DC


Multivitamins/ Calcium (Thera-M Plus) 1 tab DAILY PO  Last administered on 5/30/ 18at 08:47;  Start 5/27/18 at 09:00


Neomycin/ Polymyxin/ Bacitracin (Triple Antibiotic Ointment) 1 pkt PRN DAILY  

PRN TP Wound Healing;  Start 5/26/18 at 21:15


Simethicone (Gas-X) 80 mg PRN AFTMEALHC  PRN PO GAS / BLOATING;  Start 5/26/18 

at 21:15


Ascorbic Acid (Vitamin C) 500 mg DAILY PO  Last administered on 5/30/18at 08:47

;  Start 5/27/18 at 09:00


Bisacodyl (Dulcolax Supp) 10 mg PRN DAILY  PRN PA CONSTIPATION;  Start 5/26/18 

at 21:30


Cetirizine HCl (ZyrTEC) 10 mg DAILY PO  Last administered on 5/30/18at 08:48;  

Start 5/27/18 at 09:00


Docusate Sodium (Colace) 100 mg PRN BID  PRN PO CONSTIPATION;  Start 5/26/18 at 

21:30


Famotidine (Pepcid) 20 mg BIDBFRMEAL PO  Last administered on 5/27/18at 07:49;  

Start 5/27/18 at 07:30;  Stop 5/27/18 at 07:53;  Status DC


Finasteride (Proscar) 5 mg DAILY PO  Last administered on 5/30/18at 08:47;  

Start 5/27/18 at 09:00


Lactobacillus Rhamnosus (Culturelle) 1 cap BID PO  Last administered on 5/30/ 18at 20:35;  Start 5/26/18 at 22:00


Oxycodone HCl (Roxicodone) 5 mg PRN Q6HRS  PRN PO PAIN Last administered on 5/30 /18at 06:17;  Start 5/26/18 at 21:30


Polyethylene Glycol (miraLAX) 17 gm BID PO  Last administered on 5/30/18at 20:36

;  Start 5/26/18 at 22:00


Sennosides (Senna) 8.6 mg BID PO  Last administered on 5/30/18at 20:36;  Start 5 /26/18 at 22:00


Terazosin HCl (Hytrin) 10 mg QHS PO  Last administered on 5/29/18at 19:23;  

Start 5/26/18 at 22:00


Ondansetron HCl (Zofran Odt) 4 mg PRN Q8HRS  PRN PO NAUSEA/VOMITING;  Start 5/26 /18 at 21:30


Famotidine (Pepcid) 20 mg BID PO  Last administered on 5/30/18at 20:35;  Start 5 /27/18 at 21:00


Levothyroxine Sodium (Synthroid) 50 mcg DAILY06 PO  Last administered on 5/30/ 18at 05:41;  Start 5/28/18 at 06:00


Vitamin D (Vitamin D3) 50,000 unit WEEKLY PO  Last administered on 5/30/18at 08:

48;  Start 5/30/18 at 09:00





Active Scripts


Active


Reported


Zofran Odt (Ondansetron) 4 Mg Tab.rapdis 4 Mg PO PRN Q6HRS PRN


Simethicone 80 Mg Tab.chew 80 Mg PO PRN AFTMEALHC PRN


Thera M Plus Tablet (Multivits,Ca,Minerals/Iron/FA) 1 Each Tablet 1 Tab PO DAILY


Mirtazapine 7.5 Mg Tablet 7.5 Mg PO QHS


Milk Of Magnesia (Magnesium Hydroxide) 400 Mg/5 Ml Oral.susp 2,400 Mg PO PRN 

DAILY PRN


Culturelle (Lactobacillus Rhamnosus Gg) 1 Each Capsule 1 Each PO BID


Vitamin D3 (Cholecalciferol (Vitamin D3)) 1,000 Unit Tablet 5,000 Unit PO DAILY


Zyrtec (Cetirizine Hcl) 10 Mg Tablet 10 Mg PO DAILY


Vitamin C (Ascorbate Calcium) 500 Mg Tablet 500 Mg PO DAILY


Trazodone Hcl 50 Mg Tablet 50 Mg PO PRN QHS PRN


Risperdal (Risperidone) 1 Mg Tablet 1.5 Mg PO QHS


Zyprexa Zydis (Olanzapine) 5 Mg Tab.rapdis 5 Mg PO PRN Q2HR PRN


Lithium Carbonate 150 Mg Capsule 150 Mg PO DAILY


Miralax (Polyethylene Glycol 3350) 17 Gm Powd.pack 17 Gm PO BID


Triple Antibiotic Ointment (Neomy Sulf/Bacitra/Polymyxin B) 1 Each Packet 1 

Packet TP PRN DAILY PRN


Oxycodone Hcl 5 Mg Capsule 5 Mg PO PRN Q6HRS PRN


Lorazepam 0.5 Mg Tablet 0.5 Mg PO TID PRN PRN


Famotidine 20 Mg Tablet 20 Mg PO BIDBFRMEAL


Terazosin Hcl 10 Mg Capsule 10 Mg PO DAILY


Lithium Carbonate 300 Mg Capsule 300 Mg PO HS


Baclofen 10 Mg Tablet 10 Mg PO BID


Maalox Maximum Strength Susp (Mag Hydrox/Al Hydrox/Simeth) 355 Ml Oral.susp 30 

Ml PO PRN Q2HR PRN


Finasteride 5 Mg Tablet 5 Mg PO DAILY


Colace (Docusate Sodium) 100 Mg Capsule 100 Mg PO PRN BID PRN


Senokot (Sennosides) 8.6 Mg Tablet 8.6 Mg PO BID


Bisacodyl 10 Mg Supp.rect 10 Mg RC PRN DAILY PRN


Tylenol (Acetaminophen) 325 Mg Tablet 650 Mg PO PRN Q6HRS PRN


I have reviewed the current psychotropics carefully including drug 

interactions.  Risk benefit ratio favors no change other than as noted in my 

dictated progress note.





Diagnosis:


Problems:  


(1) Anxiety disorder


(2) Bipolar affective, mixed, sev w/ psych


(3) Dementia, vascular, with delusions


(4) Mild cognitive impairment


(5) Osteomyelitis of hand, left, acute


(6) Quadriplegia











DIANA STALLINGS MD May 30, 2018 20:53

## 2018-05-30 NOTE — PN
DATE:  05/28/2018



PSYCHIATRIC PROGRESS NOTE



This is a late entry 05/28/2018, covers elements not covered in my initial note

05/28/2018.



SUBJECTIVE:  I met with the patient in the evening.  Overall, the patient slept

5-3/4 hours, more lucid in the morning, somewhat delusional in the evening,

appetite poor at 25%, compliant with medications.  Has some urinary retention. 

Will defer to Dr. Hodges including possible UTI.



REVIEW OF SYSTEMS:  Ambulation impaired, in wheelchair.  No CV, , pulmonary,

eye system symptoms on review.



MENTAL STATUS EXAM:  Oriented to himself and situation.  Speech is coherent,

abstraction fair, little pressured at times.  Attention span short, language

function intact.  Mood and affect still somewhat anxious, labile, but improved. 

No suicidal or homicidal ideations.  Psychotic symptoms overall better, but

little more in the evening.



LABORATORY DATA:  Reviewed.



IMPRESSION:  Bipolar 1 disorder, mixed with psychotic features, in partial

remission; cognitive disorder, unspecified.



PLAN:  Continue current psychotropics mentioned in my initial note.  Repeat

lithium level is 0.6.





______________________________

MAN COBY STALLINGS MD



DR:  BRIDGETT/timbo  JOB#:  [id number] / [id number]

DD:  05/29/2018 15:59  DT:  05/30/2018 10:45

## 2018-05-30 NOTE — NUR
Patient's dressing to his left hand fell off.  Dressing and bandage replaced per wound care 
instructions. Patient tolerated procedure well.  Incision appeared clean, no drainage, well 
approximated, mild erythema near incision site.  Will continue to moniter.

## 2018-05-30 NOTE — NUR
Behavior Intervention Response and Plan:

BIRP Note:

Behavior: Assumed Care of patient, patient located in Patient Room at shift change.  Patient 
exhibited the following behavior Interactive, Disorganized, Social.  Brief assessment on 
rounds of vital signs, medication needs, lab studies, and pain.  Treatment plan problems 1 & 
2.



Intervention: Patient assessed and the following interventions initiated safety checks 15 
Minute Checks Cognitive Assessment , Head to toe Assessment , Medications.



Response: After interactions and interventions patient responded in the following manner, 
Calm , Appropriate ,Compliant.  Continue to assess behaviors and condition will continue to 
monitor throughout the shift as needed. Patient educated on ADL's, and hand hygiene.



Plan: Continue to monitor Master Treatment Plan for patient's progress toward short term 
goals of Medication Compliance, No harm To self/ others, long term goals to return to 
previous living setting vs placement.   Continue to assess patient for changes in above 
assessment.  Monitor for medication needs, pain, and safety concerns.  Hourly rounding 
performed to ensure safe environment.

## 2018-05-30 NOTE — PN
DATE:  05/27/2018



PSYCHIATRIC PROGRESS NOTE



This is a late entry 05/27/2018 covers elements not covered in my initial note

05/27/2018.



SUBJECTIVE:  I met with the patient in the evening.  The patient slept 6 hours

previous evening, somewhat delusional at times, but pleasant, compliant.  UA has

reflux to cultures.  No active hallucinations noted.



REVIEW OF SYSTEMS:  Ambulation impaired, in wheelchair.  No CV, , pulmonary,

eye system symptoms on review.  Reliability varies.



MENTAL STATUS EXAM:  Oriented to himself and situation.  Speech coherent, less

pressured.  Abstraction fair, computation impaired, language function intact,

attention span short.  Mood and affect remains somewhat anxious, labile, but

improved.



LABORATORY DATA:  Reviewed.



IMPRESSION:  Bipolar 1 disorder, mixed with psychotic features; cognitive

disorder, unspecified.  Rest unchanged.



PLAN:  Continue current psychotropics, treat UTI if culture positive.  Lithium

level may be repeated in a day or two.  Continue Risperdal.





______________________________

MAN COBY STALLINGS MD



DR:  BRIDGETT/timbo  JOB#:  [id number] / [id number]

DD:  05/29/2018 15:57  DT:  05/30/2018 10:45

## 2018-05-31 VITALS — DIASTOLIC BLOOD PRESSURE: 70 MMHG | SYSTOLIC BLOOD PRESSURE: 108 MMHG

## 2018-05-31 VITALS — DIASTOLIC BLOOD PRESSURE: 62 MMHG | SYSTOLIC BLOOD PRESSURE: 128 MMHG

## 2018-05-31 RX ADMIN — Medication SCH CAP: at 20:00

## 2018-05-31 RX ADMIN — ACETAMINOPHEN PRN MG: 325 TABLET, FILM COATED ORAL at 08:16

## 2018-05-31 RX ADMIN — LEVOTHYROXINE SODIUM SCH MCG: 50 TABLET ORAL at 05:49

## 2018-05-31 RX ADMIN — BACLOFEN SCH MG: 10 TABLET ORAL at 08:16

## 2018-05-31 RX ADMIN — POLYETHYLENE GLYCOL 3350 SCH GM: 17 POWDER, FOR SOLUTION ORAL at 08:16

## 2018-05-31 RX ADMIN — CETIRIZINE HYDROCHLORIDE SCH MG: 10 TABLET, FILM COATED ORAL at 08:17

## 2018-05-31 RX ADMIN — BACLOFEN SCH MG: 10 TABLET ORAL at 20:00

## 2018-05-31 RX ADMIN — SENNOSIDES A AND B SCH MG: 8.6 TABLET, FILM COATED ORAL at 08:16

## 2018-05-31 RX ADMIN — TRAZODONE HYDROCHLORIDE PRN MG: 50 TABLET, FILM COATED ORAL at 01:12

## 2018-05-31 RX ADMIN — ACETAMINOPHEN PRN MG: 325 TABLET, FILM COATED ORAL at 20:02

## 2018-05-31 RX ADMIN — MIRTAZAPINE SCH MG: 7.5 TABLET, FILM COATED ORAL at 20:02

## 2018-05-31 RX ADMIN — LITHIUM CARBONATE SCH MG: 150 CAPSULE, GELATIN COATED ORAL at 08:16

## 2018-05-31 RX ADMIN — FINASTERIDE SCH MG: 5 TABLET, FILM COATED ORAL at 08:16

## 2018-05-31 RX ADMIN — TRAZODONE HYDROCHLORIDE PRN MG: 50 TABLET, FILM COATED ORAL at 20:02

## 2018-05-31 RX ADMIN — RISPERIDONE SCH MG: 0.5 TABLET ORAL at 20:00

## 2018-05-31 RX ADMIN — FAMOTIDINE SCH MG: 20 TABLET ORAL at 08:16

## 2018-05-31 RX ADMIN — MULTIPLE VITAMINS W/ MINERALS TAB SCH TAB: TAB at 08:16

## 2018-05-31 RX ADMIN — FAMOTIDINE SCH MG: 20 TABLET ORAL at 19:59

## 2018-05-31 RX ADMIN — Medication SCH CAP: at 08:16

## 2018-05-31 RX ADMIN — SENNOSIDES A AND B SCH MG: 8.6 TABLET, FILM COATED ORAL at 20:00

## 2018-05-31 RX ADMIN — POLYETHYLENE GLYCOL 3350 SCH GM: 17 POWDER, FOR SOLUTION ORAL at 20:00

## 2018-05-31 RX ADMIN — TERAZOSIN HYDROCHLORIDE SCH MG: 5 CAPSULE ORAL at 20:00

## 2018-05-31 RX ADMIN — OXYCODONE HYDROCHLORIDE AND ACETAMINOPHEN SCH MG: 500 TABLET ORAL at 08:16

## 2018-05-31 RX ADMIN — LITHIUM CARBONATE SCH MG: 150 CAPSULE, GELATIN COATED ORAL at 19:59

## 2018-05-31 NOTE — PN
DATE:  05/29/2018



This is a late entry, 05/29/2018, covers the elements not covered in my initial

note, 05/29/2018.



SUBJECTIVE:  I met with the patient in the evening.  Overall, the patient

remains somewhat withdrawn, spending much time in bed.  He did not seem to

recognize me, thought I was a preacher.  He keeps his headphones in place, seems

to calm him down.  He got the P500 bed, very pleased with this.



REVIEW OF SYSTEMS:  Ambulation impaired, in wheelchair.  No CV, , pulmonary,

eye system symptoms on review.  Intermittently in Broda chair.



MENTAL STATUS EXAM:  Oriented to himself and situation.  Speech has some

latency, coherent.  Abstraction fair, computation impaired, language function

intact, attention span short.  Mood and affect is improved.



IMPRESSION:  Lithium level 0.6.



IMPRESSION:  Unchanged from initial note.



PLAN:  Continue current psychotropics.  Adjust further as clinically indicated.





______________________________

MAN COBY STALLINGS MD



DR:  BRIDGETT/timbo  JOB#:  [id number] / [id number]

DD:  05/30/2018 16:53  DT:  05/31/2018 02:59

## 2018-05-31 NOTE — NUR
Behavior Intervention Response and Plan:

BIRP Note:

Behavior: Assumed Care of patient, patient located in Patient Room at shift change.  Patient 
exhibited the following behavior Interactive, Disorganized, Compulsive.  Brief assessment on 
rounds of vital signs, medication needs, lab studies, and pain.  Treatment plan problems 1 & 
2.



Intervention: Patient assessed and the following interventions initiated safety checks 15 
Minute Checks Cognitive Assessment , Head to toe Assessment , Medications.



Response: After interactions and interventions patient responded in the following manner, 
Calm , Appropriate ,Compliant.  Continue to assess behaviors and condition will continue to 
monitor throughout the shift as needed. Patient educated on ADL's, and hand hygiene.



Plan: Continue to monitor Master Treatment Plan for patient's progress toward short term 
goals of Decreased Agitation, Medication Compliance, long term goals to return to previous 
living setting vs placement.   Continue to assess patient for changes in above assessment.  
Monitor for medication needs, pain, and safety concerns.  Hourly rounding performed to 
ensure safe environment.

## 2018-05-31 NOTE — NUR
Pt in bed yelling out and restless. Pt reports having bad dreams about werewolves. Pt 
delusional at this time, reports that "they're coming in through the windows that's why the 
curtains are blowing". Pt reoriented to current situation. PRN Trazodone also administered 
at this time.

## 2018-05-31 NOTE — NUR
Pt c/o sacral wound pain. Pt repositioned and wound dressing changed. PRN Oxycodone 
administered as ordered.

## 2018-05-31 NOTE — PDOC
Exam


Note:


Shai Note:


Please also refer to the separate dictated note~for this date of service 

dictated separately.~Patient seen individually. Discussed the patient with 

Nursing staff reviewed the chart.~Reviewed interim history and current 

functioning. Reviewed vital signs,~Labs/ Radiology~and current medications 

noted below. Continue current treatment with the changes noted in the dictated 

addendum note





Assessment:


Vital Signs:





 Vital Signs








  Date Time  Temp Pulse Resp B/P (MAP) Pulse Ox O2 Delivery O2 Flow Rate FiO2


 


5/31/18 20:00  78  128/62    


 


5/31/18 19:18   18  22 Room Air  


 


5/31/18 16:03 97.7       








I&O











Intake and Output 


 


 5/31/18





 07:00


 


Intake Total 1795 ml


 


Output Total 1600 ml


 


Balance 195 ml


 


 


 


Intake Oral 1795 ml


 


Output Urine Total 1600 ml











Current Medications:


Meds:





Current Medications


Acetaminophen (Tylenol) 650 mg PRN Q6HRS  PRN PO PAIN / TEMP Last administered 

on 5/31/18at 20:02;  Start 5/26/18 at 21:15


Multi-Ingredient Ointment (Analgesic Balm) 1 bob PRN QID  PRN TP MUSCLE PAIN;  

Start 5/26/18 at 21:15


Al Hydroxide/Mg Hydroxide (Mylanta Plus Xs) 15 ml PRN AFTMEALHC  PRN PO 

DYSPEPSIA Last administered on 5/27/18at 03:43;  Start 5/26/18 at 21:15


Magnesium Hydroxide (Milk Of Magnesia) 2,400 mg PRN QHS  PRN PO CONSTIPATION;  

Start 5/26/18 at 21:15


Mirtazapine (Remeron) 7.5 mg QHS PO  Last administered on 5/31/18at 20:02;  

Start 5/26/18 at 22:00


Olanzapine (ZyPREXA ZYDIS) 5 mg PRN Q2HR  PRN PO PSYCHOSIS;  Start 5/26/18 at 21

:15


Baclofen (Lioresal) 10 mg BID PO  Last administered on 5/31/18at 20:00;  Start 5 /26/18 at 22:00


Lithium Carbonate 150 mg DAILY PO  Last administered on 5/31/18at 08:16;  Start 

5/27/18 at 09:00


Lithium Carbonate 300 mg QHS PO  Last administered on 5/31/18at 19:59;  Start 5/ 26/18 at 22:00


Lorazepam (Ativan) 0.5 mg PRN TID  PRN PO ANXIETY / AGITATION;  Start 5/26/18 

at 21:30


Risperidone (RisperDAL) 1.5 mg QHS PO  Last administered on 5/31/18at 20:00;  

Start 5/26/18 at 22:00


Trazodone HCl (Desyrel) 50 mg PRN QHS  PRN PO INSOMNIA, MAY REPEAT X1 Last 

administered on 5/31/18at 20:02;  Start 5/26/18 at 21:30


Vitamin D (Vitamin D3) 5,000 unit DAILY PO  Last administered on 5/29/18at 07:54

;  Start 5/27/18 at 09:00;  Stop 5/29/18 at 15:34;  Status DC


Multivitamins/ Calcium (Thera-M Plus) 1 tab DAILY PO  Last administered on 5/31/ 18at 08:16;  Start 5/27/18 at 09:00


Neomycin/ Polymyxin/ Bacitracin (Triple Antibiotic Ointment) 1 pkt PRN DAILY  

PRN TP Wound Healing;  Start 5/26/18 at 21:15


Simethicone (Gas-X) 80 mg PRN AFTMEALHC  PRN PO GAS / BLOATING;  Start 5/26/18 

at 21:15


Ascorbic Acid (Vitamin C) 500 mg DAILY PO  Last administered on 5/31/18at 08:16

;  Start 5/27/18 at 09:00


Bisacodyl (Dulcolax Supp) 10 mg PRN DAILY  PRN MS CONSTIPATION;  Start 5/26/18 

at 21:30


Cetirizine HCl (ZyrTEC) 10 mg DAILY PO  Last administered on 5/31/18at 08:17;  

Start 5/27/18 at 09:00


Docusate Sodium (Colace) 100 mg PRN BID  PRN PO CONSTIPATION;  Start 5/26/18 at 

21:30


Famotidine (Pepcid) 20 mg BIDBFRMEAL PO  Last administered on 5/27/18at 07:49;  

Start 5/27/18 at 07:30;  Stop 5/27/18 at 07:53;  Status DC


Finasteride (Proscar) 5 mg DAILY PO  Last administered on 5/31/18at 08:16;  

Start 5/27/18 at 09:00


Lactobacillus Rhamnosus (Culturelle) 1 cap BID PO  Last administered on 5/31/ 18at 20:00;  Start 5/26/18 at 22:00


Oxycodone HCl (Roxicodone) 5 mg PRN Q6HRS  PRN PO PAIN Last administered on 5/31 /18at 18:18;  Start 5/26/18 at 21:30


Polyethylene Glycol (miraLAX) 17 gm BID PO  Last administered on 5/31/18at 20:00

;  Start 5/26/18 at 22:00


Sennosides (Senna) 8.6 mg BID PO  Last administered on 5/31/18at 20:00;  Start 5 /26/18 at 22:00


Terazosin HCl (Hytrin) 10 mg QHS PO  Last administered on 5/31/18at 20:00;  

Start 5/26/18 at 22:00


Ondansetron HCl (Zofran Odt) 4 mg PRN Q8HRS  PRN PO NAUSEA/VOMITING;  Start 5/26 /18 at 21:30


Famotidine (Pepcid) 20 mg BID PO  Last administered on 5/31/18at 19:59;  Start 5 /27/18 at 21:00


Levothyroxine Sodium (Synthroid) 50 mcg DAILY06 PO  Last administered on 5/31/ 18at 05:49;  Start 5/28/18 at 06:00


Vitamin D (Vitamin D3) 50,000 unit WEEKLY PO  Last administered on 5/30/18at 08:

48;  Start 5/30/18 at 09:00





Active Scripts


Active


Reported


Zofran Odt (Ondansetron) 4 Mg Tab.rapdis 4 Mg PO PRN Q6HRS PRN


Simethicone 80 Mg Tab.chew 80 Mg PO PRN AFTMEALHC PRN


Thera M Plus Tablet (Multivits,Ca,Minerals/Iron/FA) 1 Each Tablet 1 Tab PO DAILY


Mirtazapine 7.5 Mg Tablet 7.5 Mg PO QHS


Milk Of Magnesia (Magnesium Hydroxide) 400 Mg/5 Ml Oral.susp 2,400 Mg PO PRN 

DAILY PRN


Culturelle (Lactobacillus Rhamnosus Gg) 1 Each Capsule 1 Each PO BID


Vitamin D3 (Cholecalciferol (Vitamin D3)) 1,000 Unit Tablet 5,000 Unit PO DAILY


Zyrtec (Cetirizine Hcl) 10 Mg Tablet 10 Mg PO DAILY


Vitamin C (Ascorbate Calcium) 500 Mg Tablet 500 Mg PO DAILY


Trazodone Hcl 50 Mg Tablet 50 Mg PO PRN QHS PRN


Risperdal (Risperidone) 1 Mg Tablet 1.5 Mg PO QHS


Zyprexa Zydis (Olanzapine) 5 Mg Tab.rapdis 5 Mg PO PRN Q2HR PRN


Lithium Carbonate 150 Mg Capsule 150 Mg PO DAILY


Miralax (Polyethylene Glycol 3350) 17 Gm Powd.pack 17 Gm PO BID


Triple Antibiotic Ointment (Neomy Sulf/Bacitra/Polymyxin B) 1 Each Packet 1 

Packet TP PRN DAILY PRN


Oxycodone Hcl 5 Mg Capsule 5 Mg PO PRN Q6HRS PRN


Lorazepam 0.5 Mg Tablet 0.5 Mg PO TID PRN PRN


Famotidine 20 Mg Tablet 20 Mg PO BIDBFRMEAL


Terazosin Hcl 10 Mg Capsule 10 Mg PO DAILY


Lithium Carbonate 300 Mg Capsule 300 Mg PO HS


Baclofen 10 Mg Tablet 10 Mg PO BID


Maalox Maximum Strength Susp (Mag Hydrox/Al Hydrox/Simeth) 355 Ml Oral.susp 30 

Ml PO PRN Q2HR PRN


Finasteride 5 Mg Tablet 5 Mg PO DAILY


Colace (Docusate Sodium) 100 Mg Capsule 100 Mg PO PRN BID PRN


Senokot (Sennosides) 8.6 Mg Tablet 8.6 Mg PO BID


Bisacodyl 10 Mg Supp.rect 10 Mg RC PRN DAILY PRN


Tylenol (Acetaminophen) 325 Mg Tablet 650 Mg PO PRN Q6HRS PRN


I have reviewed the current psychotropics carefully including drug 

interactions.  Risk benefit ratio favors no change other than as noted in my 

dictated progress note.





Diagnosis:


Problems:  


(1) Anxiety disorder


(2) Bipolar affective, mixed, sev w/ psych


(3) Dementia, vascular, with delusions


(4) Mild cognitive impairment


(5) Osteomyelitis of hand, left, acute


(6) Quadriplegia











DIANA STALLINGS MD May 31, 2018 20:34

## 2018-05-31 NOTE — PN
DATE:  05/30/2018



PSYCHIATRIC PROGRESS NOTE



This is a late entry 05/30/2018 covers elements not covered in my initial note

05/30/2018.



SUBJECTIVE:  I met with the patient in the evening.  Overall, the patient has

had a better day.  He spends much time in his room in bed because he likes the

bed, which is very comfortable.  He is in _____  .  He was up for [employer], goes

out for his meals.



REVIEW OF SYSTEMS:  Ambulation impaired, in wheelchair.  We will start physical

therapy.  No CV, , pulmonary, eye, ENT system symptoms on review.  He enjoys

listening to music with headphones which are helped to calm him down.



MENTAL STATUS EXAM:  Oriented to himself and situation.  Speech has some

latency, coherent.  Abstraction fair, computation impaired, language function

intact, attention span short.  Mood and affect, lability is improved.  No clear

psychotic symptoms.



LABORATORY DATA:  Reviewed.



IMPRESSION:  Unchanged from initial note bipolar 1 disorder, mixed with

psychotic features, in partial remission.  Rest unchanged.



PLAN:  Continue current psychotropics.  Lithium level is 0.6.





______________________________

MAN COBY STALLINGS MD



DR:  BRIDGETT/timbo  JOB#:  [id number] / [id number]

DD:  05/31/2018 12:23  DT:  05/31/2018 21:19

## 2018-05-31 NOTE — NUR
Behavior Intervention Response and Plan:

BIRP Note:

Behavior: Assumed Care of patient, patient located in Patient Room at shift change.  Patient 
exhibited the following behavior Disorganized, Restless, Cooperative.  Brief assessment on 
rounds of vital signs, medication needs, lab studies, and pain.  Treatment plan problems 1 
and 2.



Intervention: Patient assessed and the following interventions initiated safety checks 15 
Minute Checks Cognitive Assessment , Head to toe Assessment , Medications.



Response: After interactions and interventions patient responded in the following manner, 
Anxious , Delusions ,Restless.  Continue to assess behaviors and condition will continue to 
monitor throughout the shift as needed. Patient educated on ADL's, and hand hygiene.



Plan: Continue to monitor Master Treatment Plan for patient's progress toward short term 
goals of Decreased Anxiety, No harm To self/ others, long term goals to return to previous 
living setting vs placement.   Continue to assess patient for changes in above assessment.  
Monitor for medication needs, pain, and safety concerns.  Hourly rounding performed to 
ensure safe environment.

## 2018-06-01 VITALS — DIASTOLIC BLOOD PRESSURE: 56 MMHG | SYSTOLIC BLOOD PRESSURE: 114 MMHG

## 2018-06-01 VITALS — SYSTOLIC BLOOD PRESSURE: 93 MMHG | DIASTOLIC BLOOD PRESSURE: 52 MMHG

## 2018-06-01 RX ADMIN — MULTIPLE VITAMINS W/ MINERALS TAB SCH TAB: TAB at 07:30

## 2018-06-01 RX ADMIN — BACLOFEN SCH MG: 10 TABLET ORAL at 19:51

## 2018-06-01 RX ADMIN — LITHIUM CARBONATE SCH MG: 150 CAPSULE, GELATIN COATED ORAL at 19:52

## 2018-06-01 RX ADMIN — Medication SCH CAP: at 07:30

## 2018-06-01 RX ADMIN — POLYETHYLENE GLYCOL 3350 SCH GM: 17 POWDER, FOR SOLUTION ORAL at 07:30

## 2018-06-01 RX ADMIN — SENNOSIDES A AND B SCH MG: 8.6 TABLET, FILM COATED ORAL at 07:30

## 2018-06-01 RX ADMIN — CETIRIZINE HYDROCHLORIDE SCH MG: 10 TABLET, FILM COATED ORAL at 07:30

## 2018-06-01 RX ADMIN — TRAZODONE HYDROCHLORIDE PRN MG: 50 TABLET, FILM COATED ORAL at 22:45

## 2018-06-01 RX ADMIN — BACLOFEN SCH MG: 10 TABLET ORAL at 07:30

## 2018-06-01 RX ADMIN — ACETAMINOPHEN PRN MG: 325 TABLET, FILM COATED ORAL at 10:54

## 2018-06-01 RX ADMIN — SENNOSIDES A AND B SCH MG: 8.6 TABLET, FILM COATED ORAL at 19:51

## 2018-06-01 RX ADMIN — RISPERIDONE SCH MG: 0.5 TABLET ORAL at 19:51

## 2018-06-01 RX ADMIN — FAMOTIDINE SCH MG: 20 TABLET ORAL at 19:52

## 2018-06-01 RX ADMIN — LEVOTHYROXINE SODIUM SCH MCG: 50 TABLET ORAL at 05:25

## 2018-06-01 RX ADMIN — OXYCODONE HYDROCHLORIDE AND ACETAMINOPHEN SCH MG: 500 TABLET ORAL at 07:30

## 2018-06-01 RX ADMIN — FINASTERIDE SCH MG: 5 TABLET, FILM COATED ORAL at 07:30

## 2018-06-01 RX ADMIN — TERAZOSIN HYDROCHLORIDE SCH MG: 5 CAPSULE ORAL at 19:51

## 2018-06-01 RX ADMIN — FAMOTIDINE SCH MG: 20 TABLET ORAL at 07:29

## 2018-06-01 RX ADMIN — LITHIUM CARBONATE SCH MG: 150 CAPSULE, GELATIN COATED ORAL at 07:30

## 2018-06-01 RX ADMIN — POLYETHYLENE GLYCOL 3350 SCH GM: 17 POWDER, FOR SOLUTION ORAL at 19:51

## 2018-06-01 RX ADMIN — MIRTAZAPINE SCH MG: 7.5 TABLET, FILM COATED ORAL at 19:52

## 2018-06-01 RX ADMIN — Medication SCH CAP: at 19:51

## 2018-06-01 NOTE — NUR
Behavior Intervention Response and Plan:

BIRP Note:

Behavior: Assumed Care of patient, patient located in Patient Room at shift change.  Patient 
exhibited the following behavior Calm, Delusions, Compliant.  Brief assessment on rounds of 
vital signs, medication needs, lab studies, and pain.  Treatment plan problems Altered 
thought process and Fall Risk.



Intervention: Patient assessed and the following interventions initiated safety checks 15 
Minute Checks Cognitive Assessment , Head to toe Assessment , Medications.



Response: After interactions and interventions patient responded in the following manner, 
Calm , Delusions ,Compliant.  Continue to assess behaviors and condition will continue to 
monitor throughout the shift as needed. Patient educated on ADL's, and hand hygiene.



Plan: Continue to monitor Master Treatment Plan for patient's progress toward short term 
goals of Decreased Agitation, Decreased Aggression, long term goals to return to previous 
living setting vs placement.   Continue to assess patient for changes in above assessment.  
Monitor for medication needs, pain, and safety concerns.  Hourly rounding performed to 
ensure safe environment.

## 2018-06-01 NOTE — PDOC
Exam


Note:


Shai Note:


Please also refer to the separate dictated note~for this date of service 

dictated separately.~Patient seen individually. Discussed the patient with 

Nursing staff reviewed the chart.~Reviewed interim history and current 

functioning. Reviewed vital signs,~Labs/ Radiology~and current medications 

noted below. Continue current treatment with the changes noted in the dictated 

addendum note





Assessment:


Vital Signs:





 Vital Signs








  Date Time  Temp Pulse Resp B/P (MAP) Pulse Ox O2 Delivery O2 Flow Rate FiO2


 


6/1/18 20:27     96   


 


6/1/18 19:51  66  114/64    


 


6/1/18 15:59 98.5  16     


 


6/1/18 07:38      Room Air  








I&O











Intake and Output 


 


 6/1/18





 07:00


 


Intake Total 1620 ml


 


Output Total 700 ml


 


Balance 920 ml


 


 


 


Intake Oral 1620 ml


 


Output Urine Total 700 ml











Current Medications:


Meds:





Current Medications


Acetaminophen (Tylenol) 650 mg PRN Q6HRS  PRN PO PAIN / TEMP Last administered 

on 6/1/18at 10:54;  Start 5/26/18 at 21:15


Multi-Ingredient Ointment (Analgesic Balm) 1 bob PRN QID  PRN TP MUSCLE PAIN;  

Start 5/26/18 at 21:15


Al Hydroxide/Mg Hydroxide (Mylanta Plus Xs) 15 ml PRN AFTMEALHC  PRN PO 

DYSPEPSIA Last administered on 5/27/18at 03:43;  Start 5/26/18 at 21:15


Magnesium Hydroxide (Milk Of Magnesia) 2,400 mg PRN QHS  PRN PO CONSTIPATION;  

Start 5/26/18 at 21:15


Mirtazapine (Remeron) 7.5 mg QHS PO  Last administered on 6/1/18at 19:52;  

Start 5/26/18 at 22:00


Olanzapine (ZyPREXA ZYDIS) 5 mg PRN Q2HR  PRN PO PSYCHOSIS;  Start 5/26/18 at 21

:15


Baclofen (Lioresal) 10 mg BID PO  Last administered on 6/1/18at 19:51;  Start 5/ 26/18 at 22:00


Lithium Carbonate 150 mg DAILY PO  Last administered on 6/1/18at 07:30;  Start 5 /27/18 at 09:00


Lithium Carbonate 300 mg QHS PO  Last administered on 6/1/18at 19:52;  Start 5/ 26/18 at 22:00


Lorazepam (Ativan) 0.5 mg PRN TID  PRN PO ANXIETY / AGITATION Last administered 

on 6/1/18at 10:54;  Start 5/26/18 at 21:30


Risperidone (RisperDAL) 1.5 mg QHS PO  Last administered on 6/1/18at 19:51;  

Start 5/26/18 at 22:00


Trazodone HCl (Desyrel) 50 mg PRN QHS  PRN PO INSOMNIA, MAY REPEAT X1 Last 

administered on 5/31/18at 20:02;  Start 5/26/18 at 21:30


Vitamin D (Vitamin D3) 5,000 unit DAILY PO  Last administered on 5/29/18at 07:54

;  Start 5/27/18 at 09:00;  Stop 5/29/18 at 15:34;  Status DC


Multivitamins/ Calcium (Thera-M Plus) 1 tab DAILY PO  Last administered on 6/1/ 18at 07:30;  Start 5/27/18 at 09:00


Neomycin/ Polymyxin/ Bacitracin (Triple Antibiotic Ointment) 1 pkt PRN DAILY  

PRN TP Wound Healing;  Start 5/26/18 at 21:15


Simethicone (Gas-X) 80 mg PRN AFTMEALHC  PRN PO GAS / BLOATING;  Start 5/26/18 

at 21:15


Ascorbic Acid (Vitamin C) 500 mg DAILY PO  Last administered on 6/1/18at 07:30;

  Start 5/27/18 at 09:00


Bisacodyl (Dulcolax Supp) 10 mg PRN DAILY  PRN [place] CONSTIPATION;  Start 5/26/18 

at 21:30


Cetirizine HCl (ZyrTEC) 10 mg DAILY PO  Last administered on 6/1/18at 07:30;  

Start 5/27/18 at 09:00


Docusate Sodium (Colace) 100 mg PRN BID  PRN PO CONSTIPATION;  Start 5/26/18 at 

21:30


Famotidine (Pepcid) 20 mg BIDBFRMEAL PO  Last administered on 5/27/18at 07:49;  

Start 5/27/18 at 07:30;  Stop 5/27/18 at 07:53;  Status DC


Finasteride (Proscar) 5 mg DAILY PO  Last administered on 6/1/18at 07:30;  

Start 5/27/18 at 09:00


Lactobacillus Rhamnosus (Culturelle) 1 cap BID PO  Last administered on 6/1/ 18at 19:51;  Start 5/26/18 at 22:00


Oxycodone HCl (Roxicodone) 5 mg PRN Q6HRS  PRN PO PAIN Last administered on 6/1/ 18at 16:48;  Start 5/26/18 at 21:30


Polyethylene Glycol (miraLAX) 17 gm BID PO  Last administered on 6/1/18at 19:51

;  Start 5/26/18 at 22:00


Sennosides (Senna) 8.6 mg BID PO  Last administered on 6/1/18at 19:51;  Start 5/ 26/18 at 22:00


Terazosin HCl (Hytrin) 10 mg QHS PO  Last administered on 6/1/18at 19:51;  

Start 5/26/18 at 22:00


Ondansetron HCl (Zofran Odt) 4 mg PRN Q8HRS  PRN PO NAUSEA/VOMITING;  Start 5/26 /18 at 21:30


Famotidine (Pepcid) 20 mg BID PO  Last administered on 6/1/18at 19:52;  Start 5/ 27/18 at 21:00


Levothyroxine Sodium (Synthroid) 50 mcg DAILY06 PO  Last administered on 6/1/ 18at 05:25;  Start 5/28/18 at 06:00


Vitamin D (Vitamin D3) 50,000 unit WEEKLY PO  Last administered on 5/30/18at 08:

48;  Start 5/30/18 at 09:00





Active Scripts


Active


Reported


Zofran Odt (Ondansetron) 4 Mg Tab.rapdis 4 Mg PO PRN Q6HRS PRN


Simethicone 80 Mg Tab.chew 80 Mg PO PRN AFTMEALHC PRN


Thera M Plus Tablet (Multivits,Ca,Minerals/Iron/FA) 1 Each Tablet 1 Tab PO DAILY


Mirtazapine 7.5 Mg Tablet 7.5 Mg PO QHS


Milk Of Magnesia (Magnesium Hydroxide) 400 Mg/5 Ml Oral.susp 2,400 Mg PO PRN 

DAILY PRN


Culturelle (Lactobacillus Rhamnosus Gg) 1 Each Capsule 1 Each PO BID


Vitamin D3 (Cholecalciferol (Vitamin D3)) 1,000 Unit Tablet 5,000 Unit PO DAILY


Zyrtec (Cetirizine Hcl) 10 Mg Tablet 10 Mg PO DAILY


Vitamin C (Ascorbate Calcium) 500 Mg Tablet 500 Mg PO DAILY


Trazodone Hcl 50 Mg Tablet 50 Mg PO PRN QHS PRN


Risperdal (Risperidone) 1 Mg Tablet 1.5 Mg PO QHS


Zyprexa Zydis (Olanzapine) 5 Mg Tab.rapdis 5 Mg PO PRN Q2HR PRN


Lithium Carbonate 150 Mg Capsule 150 Mg PO DAILY


Miralax (Polyethylene Glycol 3350) 17 Gm Powd.pack 17 Gm PO BID


Triple Antibiotic Ointment (Neomy Sulf/Bacitra/Polymyxin B) 1 Each Packet 1 

Packet TP PRN DAILY PRN


Oxycodone Hcl 5 Mg Capsule 5 Mg PO PRN Q6HRS PRN


Lorazepam 0.5 Mg Tablet 0.5 Mg PO TID PRN PRN


Famotidine 20 Mg Tablet 20 Mg PO BIDBFRMEAL


Terazosin Hcl 10 Mg Capsule 10 Mg PO DAILY


Lithium Carbonate 300 Mg Capsule 300 Mg PO HS


Baclofen 10 Mg Tablet 10 Mg PO BID


Maalox Maximum Strength Susp (Mag Hydrox/Al Hydrox/Simeth) 355 Ml Oral.susp 30 

Ml PO PRN Q2HR PRN


Finasteride 5 Mg Tablet 5 Mg PO DAILY


Colace (Docusate Sodium) 100 Mg Capsule 100 Mg PO PRN BID PRN


Senokot (Sennosides) 8.6 Mg Tablet 8.6 Mg PO BID


Bisacodyl 10 Mg Supp.rect 10 Mg RC PRN DAILY PRN


Tylenol (Acetaminophen) 325 Mg Tablet 650 Mg PO PRN Q6HRS PRN


I have reviewed the current psychotropics carefully including drug 

interactions.  Risk benefit ratio favors no change other than as noted in my 

dictated progress note.





Diagnosis:


Problems:  


(1) Anxiety disorder


(2) Bipolar affective, mixed, sev w/ psych


(3) Dementia, vascular, with delusions


(4) Mild cognitive impairment


(5) Osteomyelitis of hand, left, acute


(6) Quadriplegia











DIANA STALLINGS MD Jun 1, 2018 20:36

## 2018-06-01 NOTE — NUR
Behavior Intervention Response and Plan:

BIRP Note:

Behavior: Assumed Care of patient, patient located in Patient Room at shift change.  Patient 
exhibited the following behavior Restless, Cooperative, Appropriate.  Brief assessment on 
rounds of vital signs, medication needs, lab studies, and pain.  Treatment plan problems 1 
and 2.



Intervention: Patient assessed and the following interventions initiated safety checks 15 
Minute Checks Cognitive Assessment , Head to toe Assessment , Medications.



Response: After interactions and interventions patient responded in the following manner, 
Calm , Compliant ,Cooperative.  Continue to assess behaviors and condition will continue to 
monitor throughout the shift as needed. Patient educated on ADL's, and hand hygiene.



Plan: Continue to monitor Master Treatment Plan for patient's progress toward short term 
goals of Improved Mood, No harm To self/ others, long term goals to return to previous 
living setting vs placement.   Continue to assess patient for changes in above assessment.  
Monitor for medication needs, pain, and safety concerns.  Hourly rounding performed to 
ensure safe environment.

## 2018-06-01 NOTE — NUR
Nursing Note:

Pt delusional in bed stating he only trusts his friends then asking CNA if he could pay the 
bet he lost. Nurse in to assess, Pt noted with c/o severe pain in back, remains delusional 
and denies feeling tired. PRN Oxycodone and Trazodone administered per PRN order at this 
time.

## 2018-06-02 VITALS — SYSTOLIC BLOOD PRESSURE: 110 MMHG | DIASTOLIC BLOOD PRESSURE: 65 MMHG

## 2018-06-02 VITALS — SYSTOLIC BLOOD PRESSURE: 117 MMHG | DIASTOLIC BLOOD PRESSURE: 62 MMHG

## 2018-06-02 LAB
ALBUMIN SERPL-MCNC: 2.2 G/DL (ref 3.4–5)
ALBUMIN/GLOB SERPL: 0.6 {RATIO} (ref 1–1.7)
ALP SERPL-CCNC: 81 U/L (ref 46–116)
ALT SERPL-CCNC: 14 U/L (ref 16–63)
ANION GAP SERPL CALC-SCNC: 5 MMOL/L (ref 6–14)
AST SERPL-CCNC: 13 U/L (ref 15–37)
BASOPHILS # BLD AUTO: 0.1 X10^3/UL (ref 0–0.2)
BASOPHILS NFR BLD: 1 % (ref 0–3)
BILIRUB SERPL-MCNC: 0.3 MG/DL (ref 0.2–1)
BUN/CREAT SERPL: 22 (ref 6–20)
CA-I SERPL ISE-MCNC: 24 MG/DL (ref 8–26)
CALCIUM SERPL-MCNC: 8.6 MG/DL (ref 8.5–10.1)
CHLORIDE SERPL-SCNC: 107 MMOL/L (ref 98–107)
CO2 SERPL-SCNC: 30 MMOL/L (ref 21–32)
CREAT SERPL-MCNC: 1.1 MG/DL (ref 0.7–1.3)
EOSINOPHIL NFR BLD: 0.1 X10^3/UL (ref 0–0.7)
EOSINOPHIL NFR BLD: 1 % (ref 0–3)
ERYTHROCYTE [DISTWIDTH] IN BLOOD BY AUTOMATED COUNT: 16.2 % (ref 11.5–14.5)
GFR SERPLBLD BASED ON 1.73 SQ M-ARVRAT: 65.6 ML/MIN
GLOBULIN SER-MCNC: 3.9 G/DL (ref 2.2–3.8)
GLUCOSE SERPL-MCNC: 96 MG/DL (ref 70–99)
HCT VFR BLD CALC: 33 % (ref 39–53)
HGB BLD-MCNC: 11 G/DL (ref 13–17.5)
LITHIUM SERPL-SCNC: 0.8 MMOL/L (ref 0.6–1.2)
LYMPHOCYTES # BLD: 3.2 X10^3/UL (ref 1–4.8)
LYMPHOCYTES NFR BLD AUTO: 39 % (ref 24–48)
MCH RBC QN AUTO: 31 PG (ref 25–35)
MCHC RBC AUTO-ENTMCNC: 33 G/DL (ref 31–37)
MCV RBC AUTO: 92 FL (ref 79–100)
MONO #: 0.5 X10^3/UL (ref 0–1.1)
MONOCYTES NFR BLD: 7 % (ref 0–9)
NEUT #: 4.3 X10^3UL (ref 1.8–7.7)
NEUTROPHILS NFR BLD AUTO: 53 % (ref 31–73)
PLATELET # BLD AUTO: 209 X10^3/UL (ref 140–400)
POTASSIUM SERPL-SCNC: 4 MMOL/L (ref 3.5–5.1)
PROT SERPL-MCNC: 6.1 G/DL (ref 6.4–8.2)
RBC # BLD AUTO: 3.6 X10^6/UL (ref 4.3–5.7)
SODIUM SERPL-SCNC: 142 MMOL/L (ref 136–145)
WBC # BLD AUTO: 8.1 X10^3/UL (ref 4–11)

## 2018-06-02 RX ADMIN — LITHIUM CARBONATE SCH MG: 150 CAPSULE, GELATIN COATED ORAL at 19:30

## 2018-06-02 RX ADMIN — RISPERIDONE SCH MG: 0.5 TABLET ORAL at 19:30

## 2018-06-02 RX ADMIN — OXYCODONE HYDROCHLORIDE AND ACETAMINOPHEN SCH MG: 500 TABLET ORAL at 07:28

## 2018-06-02 RX ADMIN — BACLOFEN SCH MG: 10 TABLET ORAL at 19:30

## 2018-06-02 RX ADMIN — POLYETHYLENE GLYCOL 3350 SCH GM: 17 POWDER, FOR SOLUTION ORAL at 19:32

## 2018-06-02 RX ADMIN — BACLOFEN SCH MG: 10 TABLET ORAL at 07:28

## 2018-06-02 RX ADMIN — FAMOTIDINE SCH MG: 20 TABLET ORAL at 07:28

## 2018-06-02 RX ADMIN — POLYETHYLENE GLYCOL 3350 SCH GM: 17 POWDER, FOR SOLUTION ORAL at 07:28

## 2018-06-02 RX ADMIN — MULTIPLE VITAMINS W/ MINERALS TAB SCH TAB: TAB at 07:28

## 2018-06-02 RX ADMIN — LITHIUM CARBONATE SCH MG: 150 CAPSULE, GELATIN COATED ORAL at 07:28

## 2018-06-02 RX ADMIN — Medication SCH CAP: at 19:29

## 2018-06-02 RX ADMIN — Medication SCH CAP: at 07:28

## 2018-06-02 RX ADMIN — TERAZOSIN HYDROCHLORIDE SCH MG: 5 CAPSULE ORAL at 19:32

## 2018-06-02 RX ADMIN — FINASTERIDE SCH MG: 5 TABLET, FILM COATED ORAL at 07:28

## 2018-06-02 RX ADMIN — LEVOTHYROXINE SODIUM SCH MCG: 50 TABLET ORAL at 05:59

## 2018-06-02 RX ADMIN — SENNOSIDES A AND B SCH MG: 8.6 TABLET, FILM COATED ORAL at 19:30

## 2018-06-02 RX ADMIN — FAMOTIDINE SCH MG: 20 TABLET ORAL at 19:31

## 2018-06-02 RX ADMIN — SENNOSIDES A AND B SCH MG: 8.6 TABLET, FILM COATED ORAL at 07:28

## 2018-06-02 RX ADMIN — MIRTAZAPINE SCH MG: 15 TABLET, FILM COATED ORAL at 19:32

## 2018-06-02 RX ADMIN — CETIRIZINE HYDROCHLORIDE SCH MG: 10 TABLET, FILM COATED ORAL at 07:28

## 2018-06-02 RX ADMIN — TRAZODONE HYDROCHLORIDE PRN MG: 50 TABLET, FILM COATED ORAL at 22:22

## 2018-06-02 NOTE — PDOC
Exam


Note:


Shai Note:


Please also refer to the separate dictated note~for this date of service 

dictated separately.~Patient seen individually. Discussed the patient with 

Nursing staff reviewed the chart.~Reviewed interim history and current 

functioning. Reviewed vital signs,~Labs/ Radiology~and current medications 

noted below. Continue current treatment with the changes noted in the dictated 

addendum note





Assessment:


Vital Signs:





 Vital Signs








  Date Time  Temp Pulse Resp B/P (MAP) Pulse Ox O2 Delivery O2 Flow Rate FiO2


 


6/2/18 19:32  70  117/62    


 


6/2/18 16:32 98.1  20  97   


 


6/1/18 07:38      Room Air  








I&O











Intake and Output 


 


 6/2/18





 07:00


 


Intake Total 1560 ml


 


Output Total 850 ml


 


Balance 710 ml


 


 


 


Intake Oral 1560 ml


 


Output Urine Total 850 ml








Labs:





 Laboratory Tests








Test


 6/2/18


06:50


 


White Blood Count


 8.1 x10^3/uL


(4.0-11.0)


 


Red Blood Count


 3.60 x10^6/uL


(4.30-5.70)  L


 


Hemoglobin


 11.0 g/dL


(13.0-17.5)  L


 


Hematocrit


 33.0 %


(39.0-53.0)  L


 


Mean Corpuscular Volume


 92 fL ()





 


Mean Corpuscular Hemoglobin 31 pg (25-35)  


 


Mean Corpuscular Hemoglobin


Concent 33 g/dL


(31-37)


 


Red Cell Distribution Width


 16.2 %


(11.5-14.5)  H


 


Platelet Count


 209 x10^3/uL


(140-400)


 


Neutrophils (%) (Auto) 53 % (31-73)  


 


Lymphocytes (%) (Auto) 39 % (24-48)  


 


Monocytes (%) (Auto) 7 % (0-9)  


 


Eosinophils (%) (Auto) 1 % (0-3)  


 


Basophils (%) (Auto) 1 % (0-3)  


 


Neutrophils # (Auto)


 4.3 x10^3uL


(1.8-7.7)


 


Lymphocytes # (Auto)


 3.2 x10^3/uL


(1.0-4.8)


 


Monocytes # (Auto)


 0.5 x10^3/uL


(0.0-1.1)


 


Eosinophils # (Auto)


 0.1 x10^3/uL


(0.0-0.7)


 


Basophils # (Auto)


 0.1 x10^3/uL


(0.0-0.2)


 


Sodium Level


 142 mmol/L


(136-145)


 


Potassium Level


 4.0 mmol/L


(3.5-5.1)


 


Chloride Level


 107 mmol/L


()


 


Carbon Dioxide Level


 30 mmol/L


(21-32)


 


Anion Gap 5 (6-14)  L


 


Blood Urea Nitrogen


 24 mg/dL


(8-26)


 


Creatinine


 1.1 mg/dL


(0.7-1.3)


 


Estimated GFR


(Cockcroft-Gault) 65.6  





 


BUN/Creatinine Ratio 22 (6-20)  H


 


Glucose Level


 96 mg/dL


(70-99)


 


Calcium Level


 8.6 mg/dL


(8.5-10.1)


 


Total Bilirubin


 0.3 mg/dL


(0.2-1.0)


 


Aspartate Amino Transferase


(AST) 13 U/L (15-37)


L


 


Alanine Aminotransferase (ALT)


 14 U/L (16-63)


L


 


Alkaline Phosphatase


 81 U/L


()


 


Total Protein


 6.1 g/dL


(6.4-8.2)  L


 


Albumin


 2.2 g/dL


(3.4-5.0)  L


 


Albumin/Globulin Ratio


 0.6 (1.0-1.7)


L


 


Lithium Level


 0.8 mmol/L


(0.6-1.2)


 


Lithium Last Dose Date 6/1/18  


 


Lithium Last Dose Time 0900  











Current Medications:


Meds:





Current Medications


Acetaminophen (Tylenol) 650 mg PRN Q6HRS  PRN PO PAIN / TEMP Last administered 

on 6/1/18at 10:54;  Start 5/26/18 at 21:15


Multi-Ingredient Ointment (Analgesic Balm) 1 bob PRN QID  PRN TP MUSCLE PAIN;  

Start 5/26/18 at 21:15


Al Hydroxide/Mg Hydroxide (Mylanta Plus Xs) 15 ml PRN AFTMEALHC  PRN PO 

DYSPEPSIA Last administered on 5/27/18at 03:43;  Start 5/26/18 at 21:15


Magnesium Hydroxide (Milk Of Magnesia) 2,400 mg PRN QHS  PRN PO CONSTIPATION;  

Start 5/26/18 at 21:15


Mirtazapine (Remeron) 7.5 mg QHS PO  Last administered on 6/1/18at 19:52;  

Start 5/26/18 at 22:00;  Stop 6/2/18 at 18:13;  Status DC


Olanzapine (ZyPREXA ZYDIS) 5 mg PRN Q2HR  PRN PO PSYCHOSIS Last administered on 

6/2/18at 10:11;  Start 5/26/18 at 21:15


Baclofen (Lioresal) 10 mg BID PO  Last administered on 6/2/18at 19:30;  Start 5/ 26/18 at 22:00


Lithium Carbonate 150 mg DAILY PO  Last administered on 6/2/18at 07:28;  Start 5 /27/18 at 09:00


Lithium Carbonate 300 mg QHS PO  Last administered on 6/2/18at 19:30;  Start 5/ 26/18 at 22:00


Lorazepam (Ativan) 0.5 mg PRN TID  PRN PO ANXIETY / AGITATION Last administered 

on 6/1/18at 10:54;  Start 5/26/18 at 21:30


Risperidone (RisperDAL) 1.5 mg QHS PO  Last administered on 6/2/18at 19:30;  

Start 5/26/18 at 22:00


Trazodone HCl (Desyrel) 50 mg PRN QHS  PRN PO INSOMNIA, MAY REPEAT X1 Last 

administered on 6/1/18at 22:45;  Start 5/26/18 at 21:30


Vitamin D (Vitamin D3) 5,000 unit DAILY PO  Last administered on 5/29/18at 07:54

;  Start 5/27/18 at 09:00;  Stop 5/29/18 at 15:34;  Status DC


Multivitamins/ Calcium (Thera-M Plus) 1 tab DAILY PO  Last administered on 6/2/ 18at 07:28;  Start 5/27/18 at 09:00


Neomycin/ Polymyxin/ Bacitracin (Triple Antibiotic Ointment) 1 pkt PRN DAILY  

PRN TP Wound Healing;  Start 5/26/18 at 21:15


Simethicone (Gas-X) 80 mg PRN AFTMEALHC  PRN PO GAS / BLOATING;  Start 5/26/18 

at 21:15


Ascorbic Acid (Vitamin C) 500 mg DAILY PO  Last administered on 6/2/18at 07:28;

  Start 5/27/18 at 09:00


Bisacodyl (Dulcolax Supp) 10 mg PRN DAILY  PRN [place] CONSTIPATION;  Start 5/26/18 

at 21:30


Cetirizine HCl (ZyrTEC) 10 mg DAILY PO  Last administered on 6/2/18at 07:28;  

Start 5/27/18 at 09:00


Docusate Sodium (Colace) 100 mg PRN BID  PRN PO CONSTIPATION;  Start 5/26/18 at 

21:30


Famotidine (Pepcid) 20 mg BIDBFRMEAL PO  Last administered on 5/27/18at 07:49;  

Start 5/27/18 at 07:30;  Stop 5/27/18 at 07:53;  Status DC


Finasteride (Proscar) 5 mg DAILY PO  Last administered on 6/2/18at 07:28;  

Start 5/27/18 at 09:00


Lactobacillus Rhamnosus (Culturelle) 1 cap BID PO  Last administered on 6/2/ 18at 19:29;  Start 5/26/18 at 22:00


Oxycodone HCl (Roxicodone) 5 mg PRN Q6HRS  PRN PO PAIN Last administered on 6/1/ 18at 22:45;  Start 5/26/18 at 21:30


Polyethylene Glycol (miraLAX) 17 gm BID PO  Last administered on 6/2/18at 19:32

;  Start 5/26/18 at 22:00


Sennosides (Senna) 8.6 mg BID PO  Last administered on 6/2/18at 19:30;  Start 5/ 26/18 at 22:00


Terazosin HCl (Hytrin) 10 mg QHS PO  Last administered on 6/2/18at 19:32;  

Start 5/26/18 at 22:00


Ondansetron HCl (Zofran Odt) 4 mg PRN Q8HRS  PRN PO NAUSEA/VOMITING;  Start 5/26 /18 at 21:30


Famotidine (Pepcid) 20 mg BID PO  Last administered on 6/2/18at 19:31;  Start 5/ 27/18 at 21:00


Levothyroxine Sodium (Synthroid) 50 mcg DAILY06 PO  Last administered on 6/2/ 18at 05:59;  Start 5/28/18 at 06:00


Vitamin D (Vitamin D3) 50,000 unit WEEKLY PO  Last administered on 5/30/18at 08:

48;  Start 5/30/18 at 09:00


Mirtazapine (Remeron) 15 mg QHS PO  Last administered on 6/2/18at 19:32;  Start 

6/2/18 at 21:00





Active Scripts


Active


Reported


Zofran Odt (Ondansetron) 4 Mg Tab.rapdis 4 Mg PO PRN Q6HRS PRN


Simethicone 80 Mg Tab.chew 80 Mg PO PRN AFTMEALHC PRN


Thera M Plus Tablet (Multivits,Ca,Minerals/Iron/FA) 1 Each Tablet 1 Tab PO DAILY


Mirtazapine 7.5 Mg Tablet 7.5 Mg PO QHS


Milk Of Magnesia (Magnesium Hydroxide) 400 Mg/5 Ml Oral.susp 2,400 Mg PO PRN 

DAILY PRN


Culturelle (Lactobacillus Rhamnosus Gg) 1 Each Capsule 1 Each PO BID


Vitamin D3 (Cholecalciferol (Vitamin D3)) 1,000 Unit Tablet 5,000 Unit PO DAILY


Zyrtec (Cetirizine Hcl) 10 Mg Tablet 10 Mg PO DAILY


Vitamin C (Ascorbate Calcium) 500 Mg Tablet 500 Mg PO DAILY


Trazodone Hcl 50 Mg Tablet 50 Mg PO PRN QHS PRN


Risperdal (Risperidone) 1 Mg Tablet 1.5 Mg PO QHS


Zyprexa Zydis (Olanzapine) 5 Mg Tab.rapdis 5 Mg PO PRN Q2HR PRN


Lithium Carbonate 150 Mg Capsule 150 Mg PO DAILY


Miralax (Polyethylene Glycol 3350) 17 Gm Powd.pack 17 Gm PO BID


Triple Antibiotic Ointment (Neomy Sulf/Bacitra/Polymyxin B) 1 Each Packet 1 

Packet TP PRN DAILY PRN


Oxycodone Hcl 5 Mg Capsule 5 Mg PO PRN Q6HRS PRN


Lorazepam 0.5 Mg Tablet 0.5 Mg PO TID PRN PRN


Famotidine 20 Mg Tablet 20 Mg PO BIDBFRMEAL


Terazosin Hcl 10 Mg Capsule 10 Mg PO DAILY


Lithium Carbonate 300 Mg Capsule 300 Mg PO HS


Baclofen 10 Mg Tablet 10 Mg PO BID


Maalox Maximum Strength Susp (Mag Hydrox/Al Hydrox/Simeth) 355 Ml Oral.susp 30 

Ml PO PRN Q2HR PRN


Finasteride 5 Mg Tablet 5 Mg PO DAILY


Colace (Docusate Sodium) 100 Mg Capsule 100 Mg PO PRN BID PRN


Senokot (Sennosides) 8.6 Mg Tablet 8.6 Mg PO BID


Bisacodyl 10 Mg Supp.rect 10 Mg RC PRN DAILY PRN


Tylenol (Acetaminophen) 325 Mg Tablet 650 Mg PO PRN Q6HRS PRN


I have reviewed the current psychotropics carefully including drug 

interactions.  Risk benefit ratio favors no change other than as noted in my 

dictated progress note.





Diagnosis:


Problems:  


(1) Anxiety disorder


(2) Bipolar affective, mixed, sev w/ psych


(3) Dementia, vascular, with delusions


(4) Mild cognitive impairment


(5) Osteomyelitis of hand, left, acute


(6) Quadriplegia











DIANA STALLINGS MD Jun 2, 2018 22:02

## 2018-06-02 NOTE — NUR
pt up for meals. confused in am. more lucid in afternoon. pt yelling out intermittently. 
hard to understand at times. has been compliant with meds and cares.

## 2018-06-02 NOTE — NUR
Nursing Note;

Patient in bed with c/o sever back pain, rating at an 8/10 on 0-10 scale. PRN Oxycodone and 
Trazodone administered per PRN order. Snack given and consumed at this time.

## 2018-06-03 VITALS — SYSTOLIC BLOOD PRESSURE: 120 MMHG | DIASTOLIC BLOOD PRESSURE: 55 MMHG

## 2018-06-03 VITALS — SYSTOLIC BLOOD PRESSURE: 114 MMHG | DIASTOLIC BLOOD PRESSURE: 64 MMHG

## 2018-06-03 RX ADMIN — TERAZOSIN HYDROCHLORIDE SCH MG: 5 CAPSULE ORAL at 19:38

## 2018-06-03 RX ADMIN — LEVOTHYROXINE SODIUM SCH MCG: 50 TABLET ORAL at 05:00

## 2018-06-03 RX ADMIN — MULTIPLE VITAMINS W/ MINERALS TAB SCH TAB: TAB at 07:29

## 2018-06-03 RX ADMIN — SENNOSIDES A AND B SCH MG: 8.6 TABLET, FILM COATED ORAL at 07:29

## 2018-06-03 RX ADMIN — Medication SCH CAP: at 07:29

## 2018-06-03 RX ADMIN — FAMOTIDINE SCH MG: 20 TABLET ORAL at 07:29

## 2018-06-03 RX ADMIN — FAMOTIDINE SCH MG: 20 TABLET ORAL at 19:38

## 2018-06-03 RX ADMIN — POLYETHYLENE GLYCOL 3350 SCH GM: 17 POWDER, FOR SOLUTION ORAL at 19:37

## 2018-06-03 RX ADMIN — RISPERIDONE SCH MG: 0.5 TABLET ORAL at 19:38

## 2018-06-03 RX ADMIN — CETIRIZINE HYDROCHLORIDE SCH MG: 10 TABLET, FILM COATED ORAL at 07:29

## 2018-06-03 RX ADMIN — POLYETHYLENE GLYCOL 3350 SCH GM: 17 POWDER, FOR SOLUTION ORAL at 07:29

## 2018-06-03 RX ADMIN — OXYCODONE HYDROCHLORIDE AND ACETAMINOPHEN SCH MG: 500 TABLET ORAL at 07:29

## 2018-06-03 RX ADMIN — TRAZODONE HYDROCHLORIDE PRN MG: 50 TABLET, FILM COATED ORAL at 19:40

## 2018-06-03 RX ADMIN — BACLOFEN SCH MG: 10 TABLET ORAL at 07:29

## 2018-06-03 RX ADMIN — LITHIUM CARBONATE SCH MG: 150 CAPSULE, GELATIN COATED ORAL at 19:38

## 2018-06-03 RX ADMIN — Medication SCH CAP: at 19:38

## 2018-06-03 RX ADMIN — BACLOFEN SCH MG: 10 TABLET ORAL at 19:39

## 2018-06-03 RX ADMIN — LITHIUM CARBONATE SCH MG: 150 CAPSULE, GELATIN COATED ORAL at 07:29

## 2018-06-03 RX ADMIN — MIRTAZAPINE SCH MG: 15 TABLET, FILM COATED ORAL at 19:39

## 2018-06-03 RX ADMIN — FINASTERIDE SCH MG: 5 TABLET, FILM COATED ORAL at 07:30

## 2018-06-03 RX ADMIN — SENNOSIDES A AND B SCH MG: 8.6 TABLET, FILM COATED ORAL at 19:39

## 2018-06-03 NOTE — PDOC
Exam


Note:


Shai Note:


Please also refer to the separate dictated note~for this date of service 

dictated separately.~Patient seen individually. Discussed the patient with 

Nursing staff reviewed the chart.~Reviewed interim history and current 

functioning. Reviewed vital signs,~Labs/ Radiology~and current medications 

noted below. Continue current treatment with the changes noted in the dictated 

addendum note





Assessment:


Vital Signs:





 Vital Signs








  Date Time  Temp Pulse Resp B/P (MAP) Pulse Ox O2 Delivery O2 Flow Rate FiO2


 


6/3/18 19:38  64  120/55    


 


6/3/18 16:26 97.9  20  96   


 


6/1/18 07:38      Room Air  








I&O











Intake and Output 


 


 6/3/18





 07:00


 


Intake Total 860 ml


 


Output Total 1600 ml


 


Balance -740 ml


 


 


 


Intake Oral 860 ml


 


Output Urine Total 1600 ml


 


# Bowel Movements 1











Current Medications:


Meds:





Current Medications


Acetaminophen (Tylenol) 650 mg PRN Q6HRS  PRN PO PAIN / TEMP Last administered 

on 6/1/18at 10:54;  Start 5/26/18 at 21:15


Multi-Ingredient Ointment (Analgesic Balm) 1 bob PRN QID  PRN TP MUSCLE PAIN;  

Start 5/26/18 at 21:15


Al Hydroxide/Mg Hydroxide (Mylanta Plus Xs) 15 ml PRN AFTMEALHC  PRN PO 

DYSPEPSIA Last administered on 5/27/18at 03:43;  Start 5/26/18 at 21:15


Magnesium Hydroxide (Milk Of Magnesia) 2,400 mg PRN QHS  PRN PO CONSTIPATION;  

Start 5/26/18 at 21:15


Mirtazapine (Remeron) 7.5 mg QHS PO  Last administered on 6/1/18at 19:52;  

Start 5/26/18 at 22:00;  Stop 6/2/18 at 18:13;  Status DC


Olanzapine (ZyPREXA ZYDIS) 5 mg PRN Q2HR  PRN PO PSYCHOSIS Last administered on 

6/2/18at 10:11;  Start 5/26/18 at 21:15


Baclofen (Lioresal) 10 mg BID PO  Last administered on 6/3/18at 19:39;  Start 5/ 26/18 at 22:00


Lithium Carbonate 150 mg DAILY PO  Last administered on 6/3/18at 07:29;  Start 5 /27/18 at 09:00


Lithium Carbonate 300 mg QHS PO  Last administered on 6/3/18at 19:38;  Start 5/ 26/18 at 22:00


Lorazepam (Ativan) 0.5 mg PRN TID  PRN PO ANXIETY / AGITATION Last administered 

on 6/1/18at 10:54;  Start 5/26/18 at 21:30


Risperidone (RisperDAL) 1.5 mg QHS PO  Last administered on 6/3/18at 19:38;  

Start 5/26/18 at 22:00


Trazodone HCl (Desyrel) 50 mg PRN QHS  PRN PO INSOMNIA, MAY REPEAT X1 Last 

administered on 6/3/18at 19:40;  Start 5/26/18 at 21:30


Vitamin D (Vitamin D3) 5,000 unit DAILY PO  Last administered on 5/29/18at 07:54

;  Start 5/27/18 at 09:00;  Stop 5/29/18 at 15:34;  Status DC


Multivitamins/ Calcium (Thera-M Plus) 1 tab DAILY PO  Last administered on 6/3/

18at 07:29;  Start 5/27/18 at 09:00


Neomycin/ Polymyxin/ Bacitracin (Triple Antibiotic Ointment) 1 pkt PRN DAILY  

PRN TP Wound Healing;  Start 5/26/18 at 21:15


Simethicone (Gas-X) 80 mg PRN AFTMEALHC  PRN PO GAS / BLOATING;  Start 5/26/18 

at 21:15


Ascorbic Acid (Vitamin C) 500 mg DAILY PO  Last administered on 6/3/18at 07:29;

  Start 5/27/18 at 09:00


Bisacodyl (Dulcolax Supp) 10 mg PRN DAILY  PRN [place] CONSTIPATION;  Start 5/26/18 

at 21:30


Cetirizine HCl (ZyrTEC) 10 mg DAILY PO  Last administered on 6/3/18at 07:29;  

Start 5/27/18 at 09:00


Docusate Sodium (Colace) 100 mg PRN BID  PRN PO CONSTIPATION;  Start 5/26/18 at 

21:30


Famotidine (Pepcid) 20 mg BIDBFRMEAL PO  Last administered on 5/27/18at 07:49;  

Start 5/27/18 at 07:30;  Stop 5/27/18 at 07:53;  Status DC


Finasteride (Proscar) 5 mg DAILY PO  Last administered on 6/3/18at 07:30;  

Start 5/27/18 at 09:00


Lactobacillus Rhamnosus (Culturelle) 1 cap BID PO  Last administered on 6/3/

18at 19:38;  Start 5/26/18 at 22:00


Oxycodone HCl (Roxicodone) 5 mg PRN Q6HRS  PRN PO PAIN Last administered on 6/3/

18at 05:00;  Start 5/26/18 at 21:30


Polyethylene Glycol (miraLAX) 17 gm BID PO  Last administered on 6/3/18at 19:37

;  Start 5/26/18 at 22:00


Sennosides (Senna) 8.6 mg BID PO  Last administered on 6/3/18at 19:39;  Start 5/ 26/18 at 22:00


Terazosin HCl (Hytrin) 10 mg QHS PO  Last administered on 6/3/18at 19:38;  

Start 5/26/18 at 22:00


Ondansetron HCl (Zofran Odt) 4 mg PRN Q8HRS  PRN PO NAUSEA/VOMITING;  Start 5/26 /18 at 21:30


Famotidine (Pepcid) 20 mg BID PO  Last administered on 6/3/18at 19:38;  Start 5/ 27/18 at 21:00


Levothyroxine Sodium (Synthroid) 50 mcg DAILY06 PO  Last administered on 6/3/

18at 05:00;  Start 5/28/18 at 06:00


Vitamin D (Vitamin D3) 50,000 unit WEEKLY PO  Last administered on 5/30/18at 08:

48;  Start 5/30/18 at 09:00


Mirtazapine (Remeron) 15 mg QHS PO  Last administered on 6/3/18at 19:39;  Start 

6/2/18 at 21:00





Active Scripts


Active


Reported


Zofran Odt (Ondansetron) 4 Mg Tab.rapdis 4 Mg PO PRN Q6HRS PRN


Simethicone 80 Mg Tab.chew 80 Mg PO PRN AFTMEALHC PRN


Thera M Plus Tablet (Multivits,Ca,Minerals/Iron/FA) 1 Each Tablet 1 Tab PO DAILY


Mirtazapine 7.5 Mg Tablet 7.5 Mg PO QHS


Milk Of Magnesia (Magnesium Hydroxide) 400 Mg/5 Ml Oral.susp 2,400 Mg PO PRN 

DAILY PRN


Culturelle (Lactobacillus Rhamnosus Gg) 1 Each Capsule 1 Each PO BID


Vitamin D3 (Cholecalciferol (Vitamin D3)) 1,000 Unit Tablet 5,000 Unit PO DAILY


Zyrtec (Cetirizine Hcl) 10 Mg Tablet 10 Mg PO DAILY


Vitamin C (Ascorbate Calcium) 500 Mg Tablet 500 Mg PO DAILY


Trazodone Hcl 50 Mg Tablet 50 Mg PO PRN QHS PRN


Risperdal (Risperidone) 1 Mg Tablet 1.5 Mg PO QHS


Zyprexa Zydis (Olanzapine) 5 Mg Tab.rapdis 5 Mg PO PRN Q2HR PRN


Lithium Carbonate 150 Mg Capsule 150 Mg PO DAILY


Miralax (Polyethylene Glycol 3350) 17 Gm Powd.pack 17 Gm PO BID


Triple Antibiotic Ointment (Neomy Sulf/Bacitra/Polymyxin B) 1 Each Packet 1 

Packet TP PRN DAILY PRN


Oxycodone Hcl 5 Mg Capsule 5 Mg PO PRN Q6HRS PRN


Lorazepam 0.5 Mg Tablet 0.5 Mg PO TID PRN PRN


Famotidine 20 Mg Tablet 20 Mg PO BIDBFRMEAL


Terazosin Hcl 10 Mg Capsule 10 Mg PO DAILY


Lithium Carbonate 300 Mg Capsule 300 Mg PO HS


Baclofen 10 Mg Tablet 10 Mg PO BID


Maalox Maximum Strength Susp (Mag Hydrox/Al Hydrox/Simeth) 355 Ml Oral.susp 30 

Ml PO PRN Q2HR PRN


Finasteride 5 Mg Tablet 5 Mg PO DAILY


Colace (Docusate Sodium) 100 Mg Capsule 100 Mg PO PRN BID PRN


Senokot (Sennosides) 8.6 Mg Tablet 8.6 Mg PO BID


Bisacodyl 10 Mg Supp.rect 10 Mg RC PRN DAILY PRN


Tylenol (Acetaminophen) 325 Mg Tablet 650 Mg PO PRN Q6HRS PRN


I have reviewed the current psychotropics carefully including drug 

interactions.  Risk benefit ratio favors no change other than as noted in my 

dictated progress note.





Diagnosis:


Problems:  


(1) Anxiety disorder


(2) Bipolar affective, mixed, sev w/ psych


(3) Dementia, vascular, with delusions


(4) Mild cognitive impairment


(5) Osteomyelitis of hand, left, acute


(6) Quadriplegia











DIANA STALLINGS MD [place] 3, 2018 19:52

## 2018-06-03 NOTE — NUR
Nursing Note:

Pt in bed yelling "help me with my pain" Nurse in to assess, Pt c/o sever back pain rated at 
an 8/10. PRN Oxycodone administered per PRN order at this time

## 2018-06-03 NOTE — NUR
pt up for meals. more clear today. in pleasant spirits. wife and brother to visit. compliant 
with meds and cares. Staff to call Dr Regalado 6/4 and find out when sutures need removed.

## 2018-06-04 VITALS — SYSTOLIC BLOOD PRESSURE: 103 MMHG | DIASTOLIC BLOOD PRESSURE: 59 MMHG

## 2018-06-04 VITALS — DIASTOLIC BLOOD PRESSURE: 57 MMHG | SYSTOLIC BLOOD PRESSURE: 124 MMHG

## 2018-06-04 RX ADMIN — BACLOFEN SCH MG: 10 TABLET ORAL at 20:11

## 2018-06-04 RX ADMIN — POLYETHYLENE GLYCOL 3350 SCH GM: 17 POWDER, FOR SOLUTION ORAL at 20:11

## 2018-06-04 RX ADMIN — OXYCODONE HYDROCHLORIDE AND ACETAMINOPHEN SCH MG: 500 TABLET ORAL at 07:48

## 2018-06-04 RX ADMIN — FAMOTIDINE SCH MG: 20 TABLET ORAL at 20:12

## 2018-06-04 RX ADMIN — BACLOFEN SCH MG: 10 TABLET ORAL at 07:49

## 2018-06-04 RX ADMIN — LEVOTHYROXINE SODIUM SCH MCG: 50 TABLET ORAL at 03:58

## 2018-06-04 RX ADMIN — FINASTERIDE SCH MG: 5 TABLET, FILM COATED ORAL at 07:48

## 2018-06-04 RX ADMIN — MULTIPLE VITAMINS W/ MINERALS TAB SCH TAB: TAB at 07:49

## 2018-06-04 RX ADMIN — Medication SCH CAP: at 07:49

## 2018-06-04 RX ADMIN — RISPERIDONE SCH MG: 0.5 TABLET ORAL at 20:16

## 2018-06-04 RX ADMIN — Medication SCH CAP: at 20:12

## 2018-06-04 RX ADMIN — TERAZOSIN HYDROCHLORIDE SCH MG: 5 CAPSULE ORAL at 20:12

## 2018-06-04 RX ADMIN — LITHIUM CARBONATE SCH MG: 150 CAPSULE, GELATIN COATED ORAL at 07:49

## 2018-06-04 RX ADMIN — SENNOSIDES A AND B SCH MG: 8.6 TABLET, FILM COATED ORAL at 07:48

## 2018-06-04 RX ADMIN — POLYETHYLENE GLYCOL 3350 SCH GM: 17 POWDER, FOR SOLUTION ORAL at 07:49

## 2018-06-04 RX ADMIN — FAMOTIDINE SCH MG: 20 TABLET ORAL at 07:48

## 2018-06-04 RX ADMIN — TRAZODONE HYDROCHLORIDE PRN MG: 50 TABLET, FILM COATED ORAL at 20:16

## 2018-06-04 RX ADMIN — CETIRIZINE HYDROCHLORIDE SCH MG: 10 TABLET, FILM COATED ORAL at 07:49

## 2018-06-04 RX ADMIN — SENNOSIDES A AND B SCH MG: 8.6 TABLET, FILM COATED ORAL at 20:11

## 2018-06-04 RX ADMIN — MIRTAZAPINE SCH MG: 15 TABLET, FILM COATED ORAL at 20:12

## 2018-06-04 RX ADMIN — LITHIUM CARBONATE SCH MG: 150 CAPSULE, GELATIN COATED ORAL at 20:11

## 2018-06-04 NOTE — HP
ADMIT DATE:  05/27/2018



ADDENDUM



SUBJECTIVE:  The patient is a 72-year-old  male patient, who was

transferred to [facility] as he has infected broken left fifth

finger and in consultation with Dr. Gamez, he recommended surgical amputation

is the best option available and after the amputation was done Dr. Gamez did

not recommend any long-term antibiotic therapy and as the patient remained

stable, but continued to have behavioral problems.  [facility] as he was

acting as he was driving racing car claiming that the feet are burning because

of ____ car and was very excited, agitated, yelling while at [facility].



PAST MEDICAL HISTORY:  Significant for hypothyroidism, benign prostatic

hypertrophy, chronic constipation, cervical myelopathy with quadriplegia as well

as neurogenic bladder requiring indwelling Gordon catheter, also acute on-chronic

kidney injury because of bladder outlet obstruction.



PAST PSYCHIATRIC HISTORY:  Significant for having bipolar disorder.  There is

racing thoughts alternating with depression, has significant insomnia and

worsening mood swings, worsening confusion.



FAMILY HISTORY:  Noncontributory.



SOCIAL HISTORY:  He is a resident at [facility], retired from the railroad. 

He apparently has been at [facility] for a while.  He does not smoke, drink

alcohol or use any recreational drugs.



OBJECTIVE:

GENERAL:  On examining him today, he looked well and was clearly in no apparent

respiratory distress, pale, but no jaundice, cyanosis, or thyromegaly.  No

jugular venous distension.  No lower limb edema.

VITAL SIGNS:  His heart rate was 66, blood pressure 118/79, temperature was

97.8, respiratory rate 20, and oxygen saturation was 98%.  The rest of clinical

exam is stable.

HEAD, EYES, EARS, NOSE, AND THROAT:  Showed normocephalic, atraumatic.

NECK:  Supple.

HEART:  Showed normal first and second heart sounds with no gallop, rub, or

murmur.

CHEST:  Clear to auscultation.  No crepitation or rhonchi.

ABDOMEN:  Distended, soft, nontender.

NEUROLOGIC:  He is awake, alert, but confused.  All cranial nerves intact.  He

has quadriplegia with marked muscle wasting and fixed flexion contraction of all

4 limbs.  Has neurogenic bladder with bladder outlet obstruction requiring

indwelling Gordon catheter.



LABORATORY DATA:  His lab work this morning showed a white cell count of 7,400,

hemoglobin 13, hematocrit 41, MCV 92, and platelet count 236,000.  His chemistry

showed a serum sodium 143, potassium 3.8, chloride 107, bicarbonate 28, anion

gap of 8, BUN 15, creatinine 1.2, estimated GFR was 59 mL per minute.  His

glucose was 129, calcium was 8.7, magnesium 2.6.  Serum iron was 45, TIBC was

205, and percent saturation was ____.  His total bilirubin, AST, ALT, alkaline

phosphatase were normal.  Total protein 7.8, albumin 2.6.  Serum triglycerides

were 85.7, total cholesterol 143, LDL was 94, VLDL was 17, HDL was 32, and the

ratio was 4.  His TSH was 25.736.



He is currently on following medication, famotidine 20 mg twice a day,

finasteride 5 mg once a day, cetirizine 10 mg once a day, ascorbic acid 500 mg

daily, multivitamin 1 tablet once a day, vitamin D 5,000 international units as

once a day, lithium carbonate 150 mg daily, terazosin 10 mg at bedtime, senna

8.6 once a day, polyethylene glycol 17 grams once b.i.d., Lactobacillus

rhamnosus 1 capsule twice a day, risperidone 1.5 mg at bedtime, lithium

carbonate 300 mg at bedtime, baclofen 10 mg twice a day, mirtazapine 7.5 mg at

bedtime, ondansetron 4 mg every 6 hours, oxycodone 5 mg every 6 hours, Docusate

sodium 100 mg twice a day, bisacodyl 10 mg rectally as needed for constipation,

trazodone 50 mg at bedtime, lorazepam 0.5 mg 2 times a day, and simethicone 80

mg after meals, olanzapine 5 mg every 2 hours.  The patient is not on any

Synthroid.  I will start him on levothyroxine 50 mcg.  We will check his free

T3, free T4 and will also start him on 50 mcg and monitor his response.



Thank you, Dr. Dias for allowing me to participate in the care of this patient.





______________________________

SAMIR SCOTT MD



DR:  SHAWN/timbo  JOB#:  [id number] / [id number]

DD:  05/27/2018 17:22  DT:  05/29/2018 01:35

## 2018-06-04 NOTE — PDOC
Exam


Note:


Shai Note:


Please also refer to the separate dictated note~for this date of service 

dictated separately.~Patient seen individually. Discussed the patient with 

Nursing staff reviewed the chart.~Reviewed interim history and current 

functioning. Reviewed vital signs,~Labs/ Radiology~and current medications 

noted below. Continue current treatment with the changes noted in the dictated 

addendum note





Assessment:


Vital Signs:





 Vital Signs








  Date Time  Temp Pulse Resp B/P (MAP) Pulse Ox O2 Delivery O2 Flow Rate FiO2


 


6/4/18 20:12  69  124/57    


 


6/4/18 18:24     97   


 


6/4/18 16:35 98.1  18   Room Air  








I&O











Intake and Output 


 


 6/4/18





 07:00


 


Intake Total 1560 ml


 


Output Total 1850 ml


 


Balance -290 ml


 


 


 


Intake Oral 1560 ml


 


Output Urine Total 1850 ml


 


# Bowel Movements 1











Current Medications:


Meds:





Current Medications


Acetaminophen (Tylenol) 650 mg PRN Q6HRS  PRN PO PAIN / TEMP Last administered 

on 6/1/18at 10:54;  Start 5/26/18 at 21:15


Multi-Ingredient Ointment (Analgesic Balm) 1 bob PRN QID  PRN TP MUSCLE PAIN;  

Start 5/26/18 at 21:15


Al Hydroxide/Mg Hydroxide (Mylanta Plus Xs) 15 ml PRN AFTMEALHC  PRN PO 

DYSPEPSIA Last administered on 5/27/18at 03:43;  Start 5/26/18 at 21:15


Magnesium Hydroxide (Milk Of Magnesia) 2,400 mg PRN QHS  PRN PO CONSTIPATION;  

Start 5/26/18 at 21:15


Mirtazapine (Remeron) 7.5 mg QHS PO  Last administered on 6/1/18at 19:52;  

Start 5/26/18 at 22:00;  Stop 6/2/18 at 18:13;  Status DC


Olanzapine (ZyPREXA ZYDIS) 5 mg PRN Q2HR  PRN PO PSYCHOSIS Last administered on 

6/2/18at 10:11;  Start 5/26/18 at 21:15


Baclofen (Lioresal) 10 mg BID PO  Last administered on 6/4/18at 20:11;  Start 5/ 26/18 at 22:00


Lithium Carbonate 150 mg DAILY PO  Last administered on 6/4/18at 07:49;  Start 5 /27/18 at 09:00


Lithium Carbonate 300 mg QHS PO  Last administered on 6/4/18at 20:11;  Start 5/ 26/18 at 22:00


Lorazepam (Ativan) 0.5 mg PRN TID  PRN PO ANXIETY / AGITATION Last administered 

on 6/1/18at 10:54;  Start 5/26/18 at 21:30


Risperidone (RisperDAL) 1.5 mg QHS PO  Last administered on 6/4/18at 20:16;  

Start 5/26/18 at 22:00


Trazodone HCl (Desyrel) 50 mg PRN QHS  PRN PO INSOMNIA, MAY REPEAT X1 Last 

administered on 6/4/18at 20:16;  Start 5/26/18 at 21:30


Vitamin D (Vitamin D3) 5,000 unit DAILY PO  Last administered on 5/29/18at 07:54

;  Start 5/27/18 at 09:00;  Stop 5/29/18 at 15:34;  Status DC


Multivitamins/ Calcium (Thera-M Plus) 1 tab DAILY PO  Last administered on 6/4/ 18at 07:49;  Start 5/27/18 at 09:00


Neomycin/ Polymyxin/ Bacitracin (Triple Antibiotic Ointment) 1 pkt PRN DAILY  

PRN TP Wound Healing;  Start 5/26/18 at 21:15


Simethicone (Gas-X) 80 mg PRN AFTMEALHC  PRN PO GAS / BLOATING;  Start 5/26/18 

at 21:15


Ascorbic Acid (Vitamin C) 500 mg DAILY PO  Last administered on 6/4/18at 07:48;

  Start 5/27/18 at 09:00


Bisacodyl (Dulcolax Supp) 10 mg PRN DAILY  PRN [place] CONSTIPATION;  Start 5/26/18 

at 21:30


Cetirizine HCl (ZyrTEC) 10 mg DAILY PO  Last administered on 6/4/18at 07:49;  

Start 5/27/18 at 09:00


Docusate Sodium (Colace) 100 mg PRN BID  PRN PO CONSTIPATION;  Start 5/26/18 at 

21:30


Famotidine (Pepcid) 20 mg BIDBFRMEAL PO  Last administered on 5/27/18at 07:49;  

Start 5/27/18 at 07:30;  Stop 5/27/18 at 07:53;  Status DC


Finasteride (Proscar) 5 mg DAILY PO  Last administered on 6/4/18at 07:48;  

Start 5/27/18 at 09:00


Lactobacillus Rhamnosus (Culturelle) 1 cap BID PO  Last administered on 6/4/ 18at 20:12;  Start 5/26/18 at 22:00


Oxycodone HCl (Roxicodone) 5 mg PRN Q6HRS  PRN PO PAIN Last administered on 6/4/ 18at 18:24;  Start 5/26/18 at 21:30


Polyethylene Glycol (miraLAX) 17 gm BID PO  Last administered on 6/4/18at 20:11

;  Start 5/26/18 at 22:00


Sennosides (Senna) 8.6 mg BID PO  Last administered on 6/4/18at 20:11;  Start 5/ 26/18 at 22:00


Terazosin HCl (Hytrin) 10 mg QHS PO  Last administered on 6/4/18at 20:12;  

Start 5/26/18 at 22:00


Ondansetron HCl (Zofran Odt) 4 mg PRN Q8HRS  PRN PO NAUSEA/VOMITING;  Start 5/26 /18 at 21:30


Famotidine (Pepcid) 20 mg BID PO  Last administered on 6/4/18at 20:12;  Start 5/ 27/18 at 21:00


Levothyroxine Sodium (Synthroid) 50 mcg DAILY06 PO  Last administered on 6/4/ 18at 03:58;  Start 5/28/18 at 06:00


Vitamin D (Vitamin D3) 50,000 unit WEEKLY PO  Last administered on 5/30/18at 08:

48;  Start 5/30/18 at 09:00


Mirtazapine (Remeron) 15 mg QHS PO  Last administered on 6/4/18at 20:12;  Start 

6/2/18 at 21:00





Active Scripts


Active


Reported


Zofran Odt (Ondansetron) 4 Mg Tab.rapdis 4 Mg PO PRN Q6HRS PRN


Simethicone 80 Mg Tab.chew 80 Mg PO PRN AFTMEALHC PRN


Thera M Plus Tablet (Multivits,Ca,Minerals/Iron/FA) 1 Each Tablet 1 Tab PO DAILY


Mirtazapine 7.5 Mg Tablet 7.5 Mg PO QHS


Milk Of Magnesia (Magnesium Hydroxide) 400 Mg/5 Ml Oral.susp 2,400 Mg PO PRN 

DAILY PRN


Culturelle (Lactobacillus Rhamnosus Gg) 1 Each Capsule 1 Each PO BID


Vitamin D3 (Cholecalciferol (Vitamin D3)) 1,000 Unit Tablet 5,000 Unit PO DAILY


Zyrtec (Cetirizine Hcl) 10 Mg Tablet 10 Mg PO DAILY


Vitamin C (Ascorbate Calcium) 500 Mg Tablet 500 Mg PO DAILY


Trazodone Hcl 50 Mg Tablet 50 Mg PO PRN QHS PRN


Risperdal (Risperidone) 1 Mg Tablet 1.5 Mg PO QHS


Zyprexa Zydis (Olanzapine) 5 Mg Tab.rapdis 5 Mg PO PRN Q2HR PRN


Lithium Carbonate 150 Mg Capsule 150 Mg PO DAILY


Miralax (Polyethylene Glycol 3350) 17 Gm Powd.pack 17 Gm PO BID


Triple Antibiotic Ointment (Neomy Sulf/Bacitra/Polymyxin B) 1 Each Packet 1 

Packet TP PRN DAILY PRN


Oxycodone Hcl 5 Mg Capsule 5 Mg PO PRN Q6HRS PRN


Lorazepam 0.5 Mg Tablet 0.5 Mg PO TID PRN PRN


Famotidine 20 Mg Tablet 20 Mg PO BIDBFRMEAL


Terazosin Hcl 10 Mg Capsule 10 Mg PO DAILY


Lithium Carbonate 300 Mg Capsule 300 Mg PO HS


Baclofen 10 Mg Tablet 10 Mg PO BID


Maalox Maximum Strength Susp (Mag Hydrox/Al Hydrox/Simeth) 355 Ml Oral.susp 30 

Ml PO PRN Q2HR PRN


Finasteride 5 Mg Tablet 5 Mg PO DAILY


Colace (Docusate Sodium) 100 Mg Capsule 100 Mg PO PRN BID PRN


Senokot (Sennosides) 8.6 Mg Tablet 8.6 Mg PO BID


Bisacodyl 10 Mg Supp.rect 10 Mg RC PRN DAILY PRN


Tylenol (Acetaminophen) 325 Mg Tablet 650 Mg PO PRN Q6HRS PRN


I have reviewed the current psychotropics carefully including drug 

interactions.  Risk benefit ratio favors no change other than as noted in my 

dictated progress note.





Diagnosis:


Problems:  


(1) Anxiety disorder


(2) Bipolar affective, mixed, sev w/ psych


(3) Dementia, vascular, with delusions


(4) Mild cognitive impairment


(5) Osteomyelitis of hand, left, acute


(6) Quadriplegia











DIANA STALLINGS MD Jun 4, 2018 20:47

## 2018-06-04 NOTE — HP
ADMIT DATE:  05/26/2018



PSYCHIATRIC ADMISSION HISTORY/EVALUATION



This is a late entry for date of service 05/26/2018.  I dictated this note

earlier, but it is not traceable in the electronic health record system and I am

re-dictating the note.



IDENTIFYING DATA:  The patient is a 72-year-old   male, who was

initially referred to us from [facility] on account of marked

exacerbation of his bipolar disorder, psychotic symptoms, agitation,

disorganization.  He was stabilized from a psychiatric standpoint on the [facility], but during an acute phase of psychosis and marked

agitation, he had bitten his finger off.  This was treated by wound consult, but

it ultimately got infected and he was transferred to [facility]

by Dr. Hodges where the finger was amputated.  We made adjustments in his

psychotropics during that hospitalization with an adjustment of the lithium,

initiation of Risperdal, which appeared to stabilize him somewhat from a

psychiatric standpoint.  Nevertheless at [place] after the amputation he

remains psychotic with marked mood lability, agitation and he was referred back

to us for psychiatric stabilization.



CHIEF COMPLAINT:  "I am better."



HISTORY OF PRESENT ILLNESS:  As noted, the patient has a long history of bipolar

disorder with 2 separate courses of ECT treatment in the past.  Most recently at

[facility] he had been on lithium and his antipsychotics and that failed this

treatment and the changes during his last hospitalization mentioned above.  He

continues to have some sleep and appetite changes, intermittent psychotic

symptoms, confusion, agitation.



PAST PSYCHIATRIC HISTORY:  As above.



MEDICAL HISTORY:  Status post finger amputation.  Rest of the medical history is

unchanged from his last admission and the reader is referred to that evaluation

for details.



DIET:  Regular.



ACCU-CHEKS:  None.



CODE STATUS:  DNR.



ALLERGIES:  PENICILLIN.



Ambulates, he is in Broda assist x 2.



CURRENT PSYCHOTROPICS:  Lithium carbonate 150 mg in the morning and 300 mg at

night with a lithium level of 0.6, Risperdal 1.5 mg at bedtime, Remeron 7.5 mg

at bedtime, trazodone 50 mg at bedtime p.r.n., may repeat x 1 for insomnia,

Ativan p.r.n.



FAMILY HISTORY:  Noncontributory.



SOCIAL HISTORY:  No alcohol or drug abuse, physical, sexual or elder abuse

history is noted.  Not known to be a perpetrator.



MENTAL STATUS EXAMINATION:  The patient was seen shortly after he arrived on the

unit.  He is oriented to himself.  Insight, judgment, recent and remote memory,

attention and concentration impaired though at other times he seems more lucid. 

No active suicidal or homicidal ideation.



IMPRESSION:  Bipolar 1 disorder, mixed with psychotic features with acute

exacerbation; anxiety disorder, unspecified; delirium, unspecified; impulse

control disorder, unspecified.  Rest as noted above and from my prior

evaluation.



PLAN:  Continue current psychotropics, repeat lithium level, observe baseline,

adjust the Risperdal.  I will see him daily individually from a psychiatric

standpoint, medical followup per Dr. Hodges/Dr Wallace.





______________________________

MAN COBY STALLINGS MD



DR:  BRIDGETT/nts  JOB#:  [id number] / [id number]

DD:  06/04/2018 13:25  DT:  06/04/2018 14:41

## 2018-06-04 NOTE — NUR
AYESHA unable to send out admission referrals at this time due to lack of P and current  
notes available.   has been notified of issue and is being addressed.  AYESHA 
will send out referrals as soon as the issue is resolved.



AYESHA also received notice from AYESHA Andrade at [facility] that she will beout of the office this 
week and new contact will be Mary Alice Jensen at [phone] [phone].

## 2018-06-04 NOTE — PN
DATE:  06/01/2018



This late entry 06/01/2018 covers elements not covered in my initial note

06/01/2018.



SUBJECTIVE:  I met with the patient in the evening.  The patient is doing a

little better, gets anxious with pain spending much time in bed secondary to the

wound on his bottom.  Received Ativan p.r.n.  We will check a lithium level

06/02.



REVIEW OF SYSTEMS:  Ambulation impaired, in bed.  No CV, , pulmonary, eye, ENT

system symptoms on review.



MENTAL STATUS EXAM:  Oriented to himself and situation.  Speech coherent, still

somewhat pressured.  Abstraction fair, computation impaired, language function

intact, attention span short.  Mood and affect, somewhat anxious, but overall

better.



LABORATORY DATA:  Reviewed.



IMPRESSION:  Unchanged from initial note.



PLAN:  Continue psychotropics mentioned in my initial note.





______________________________

MAN COBY STALLINGS MD



DR:  BRIDGETT/timbo  JOB#:  [id number] / [id number]

DD:  06/03/2018 21:18  DT:  06/04/2018 21:14

## 2018-06-04 NOTE — PN
DATE:  06/02/2018



This is a late entry for 06/02/2018 covers elements not covered in my initial

note of 06/02/2018.



SUBJECTIVE:  I met with the patient in the evening.  The patient slept 3-1/2

hours previous evening.  He is more lucid, awake, alert, oriented to himself and

situation.  He was nevertheless delusional and yelling today earlier in the

afternoon per nursing report.  In the evening, he was cognitively much clearer.



REVIEW OF SYSTEMS:  Ambulation impaired.  No CV, , pulmonary, eye, ENT system

symptoms on review.



MENTAL STATUS EXAM:  Oriented to himself and situation.  Speech is coherent,

less pressured.  Abstraction fair, computation impaired, language function

intact.  Attention span short.  He is unable to do serial 7s beyond the first

step.  No suicidal or homicidal ideation.



LABORATORY DATA:  Reviewed.



IMPRESSION:  Unchanged from initial note.  Lithium level is 0.8.



PLAN:  Continue current psychotropics from initial note.





______________________________

MAN COBY STALLINGS MD



DR:  BRIDGETT/[email or web address]  JOB#:  [id number] / [id number]

DD:  06/03/2018 21:44  DT:  06/04/2018 21:46

## 2018-06-05 VITALS — SYSTOLIC BLOOD PRESSURE: 123 MMHG | DIASTOLIC BLOOD PRESSURE: 50 MMHG

## 2018-06-05 VITALS — DIASTOLIC BLOOD PRESSURE: 67 MMHG | SYSTOLIC BLOOD PRESSURE: 127 MMHG

## 2018-06-05 RX ADMIN — TRAZODONE HYDROCHLORIDE PRN MG: 50 TABLET, FILM COATED ORAL at 19:21

## 2018-06-05 RX ADMIN — FAMOTIDINE SCH MG: 20 TABLET ORAL at 09:32

## 2018-06-05 RX ADMIN — TERAZOSIN HYDROCHLORIDE SCH MG: 5 CAPSULE ORAL at 19:17

## 2018-06-05 RX ADMIN — LITHIUM CARBONATE SCH MG: 150 CAPSULE, GELATIN COATED ORAL at 19:17

## 2018-06-05 RX ADMIN — SENNOSIDES A AND B SCH MG: 8.6 TABLET, FILM COATED ORAL at 19:17

## 2018-06-05 RX ADMIN — Medication SCH CAP: at 19:16

## 2018-06-05 RX ADMIN — CETIRIZINE HYDROCHLORIDE SCH MG: 10 TABLET, FILM COATED ORAL at 09:33

## 2018-06-05 RX ADMIN — BACLOFEN SCH MG: 10 TABLET ORAL at 09:33

## 2018-06-05 RX ADMIN — POLYETHYLENE GLYCOL 3350 SCH GM: 17 POWDER, FOR SOLUTION ORAL at 19:16

## 2018-06-05 RX ADMIN — OXYCODONE HYDROCHLORIDE AND ACETAMINOPHEN SCH MG: 500 TABLET ORAL at 09:33

## 2018-06-05 RX ADMIN — FINASTERIDE SCH MG: 5 TABLET, FILM COATED ORAL at 09:33

## 2018-06-05 RX ADMIN — LEVOTHYROXINE SODIUM SCH MCG: 50 TABLET ORAL at 06:12

## 2018-06-05 RX ADMIN — RISPERIDONE SCH MG: 0.5 TABLET ORAL at 19:17

## 2018-06-05 RX ADMIN — FAMOTIDINE SCH MG: 20 TABLET ORAL at 19:17

## 2018-06-05 RX ADMIN — POLYETHYLENE GLYCOL 3350 SCH GM: 17 POWDER, FOR SOLUTION ORAL at 09:32

## 2018-06-05 RX ADMIN — SENNOSIDES A AND B SCH MG: 8.6 TABLET, FILM COATED ORAL at 09:33

## 2018-06-05 RX ADMIN — Medication SCH CAP: at 09:33

## 2018-06-05 RX ADMIN — LITHIUM CARBONATE SCH MG: 150 CAPSULE, GELATIN COATED ORAL at 09:33

## 2018-06-05 RX ADMIN — BACLOFEN SCH MG: 10 TABLET ORAL at 19:17

## 2018-06-05 RX ADMIN — MULTIPLE VITAMINS W/ MINERALS TAB SCH TAB: TAB at 09:33

## 2018-06-05 RX ADMIN — MIRTAZAPINE SCH MG: 15 TABLET, FILM COATED ORAL at 19:16

## 2018-06-05 NOTE — NUR
Behavior Intervention Response and Plan:

BIRP Note:

Behavior: Assumed Care of patient, patient located in Patient Room at shift change.  Patient 
exhibited the following behavior Restless, Demanding, Irritable.  Brief assessment on rounds 
of vital signs, medication needs, lab studies, and pain.  Treatment plan problems 1-2.



Intervention: Patient assessed and the following interventions initiated safety checks 15 
Minute Checks Cognitive Assessment , Head to toe Assessment , Medications.



Response: After interactions and interventions patient responded in the following manner, 
Restless , Compliant ,Compliant.  Continue to assess behaviors and condition will continue 
to monitor throughout the shift as needed. Patient educated on ADL's, and hand hygiene.



Plan: Continue to monitor Master Treatment Plan for patient's progress toward short term 
goals of Medication Compliance, No harm To self/ others, long term goals to return to 
previous living setting vs placement.   Continue to assess patient for changes in above 
assessment.  Monitor for medication needs, pain, and safety concerns.  Hourly rounding 
performed to ensure safe environment.

## 2018-06-05 NOTE — NUR
AYESHA left message for Ivory w/[facility] regarding request for Level II as pt has now admitted 
x2 to psych.

## 2018-06-05 NOTE — NUR
Behavior Intervention Response and Plan:

BIRP Note:

Behavior: Assumed Care of patient, patient located in Patient Room at shift change.  Patient 
exhibited the following behavior Restless, Calm, Appropriate.  Brief assessment on rounds of 
vital signs, medication needs, lab studies, and pain.  Treatment plan problems 1 and 2.



Intervention: Patient assessed and the following interventions initiated safety checks 15 
Minute Checks Cognitive Assessment , Head to toe Assessment , Medications.



Response: After interactions and interventions patient responded in the following manner, 
Calm , Compliant ,Cooperative.  Continue to assess behaviors and condition will continue to 
monitor throughout the shift as needed. Patient educated on ADL's, and hand hygiene.



Plan: Continue to monitor Master Treatment Plan for patient's progress toward short term 
goals of Decreased Anxiety, Improved Mood, long term goals to return to previous living 
setting vs placement.   Continue to assess patient for changes in above assessment.  Monitor 
for medication needs, pain, and safety concerns.  Hourly rounding performed to ensure safe 
environment.

## 2018-06-05 NOTE — PN
DATE:  06/04/2018



This is a late entry for 06/04/2018 covers elements not covered in my initial

note of 06/04/2018.



SUBJECTIVE:  The patient was seen in the evening.  Per nursing report, bedside

swallow study was unremarkable.  The patient spends much time in his bed in his

room listening to music, quite appropriate, cognitively much clearer.



REVIEW OF SYSTEMS:  Ambulation impaired.  No CV, , pulmonary, eye system

symptoms on review.



MENTAL STATUS EXAM:  Oriented to himself and situation.  Speech has some

latency, coherent.  Abstraction fair.  Computation, able to do one step on

serial 7's.  Memory able to remember 2/3 objects at 3 minutes.  Attention span

short.  Mood and affect is improved.  No suicidal or homicidal ideation.



IMPRESSION:  Unchanged from initial note.



PLAN:  Continue current psychotropics, lithium, Risperdal, Remeron, Ativan and

trazodone the latter to PRNs.





______________________________

DIANA STALLINGS MD



DR:  BRIDGETT/timbo  JOB#:  [id number] / [id number]

DD:  06/05/2018 10:12  DT:  06/05/2018 21:00

## 2018-06-05 NOTE — PDOC
Exam


Note:


Shai Note:


Please also refer to the separate dictated note~for this date of service 

dictated separately.~Patient seen individually. Discussed the patient with 

Nursing staff reviewed the chart.~Reviewed interim history and current 

functioning. Reviewed vital signs,~Labs/ Radiology~and current medications 

noted below. Continue current treatment with the changes noted in the dictated 

addendum note





Assessment:


Vital Signs:





 Vital Signs








  Date Time  Temp Pulse Resp B/P (MAP) Pulse Ox O2 Delivery O2 Flow Rate FiO2


 


6/5/18 19:17  77  123/50    


 


6/5/18 16:53 98.7  16  95   


 


6/5/18 09:34      Room Air  








I&O











Intake and Output 


 


 6/5/18





 07:00


 


Intake Total 1760 ml


 


Output Total 1800 ml


 


Balance -40 ml


 


 


 


Intake Oral 1760 ml


 


Output Urine Total 1800 ml


 


# Bowel Movements 1











Current Medications:


Meds:





Current Medications


Acetaminophen (Tylenol) 650 mg PRN Q6HRS  PRN PO PAIN / TEMP Last administered 

on 6/1/18at 10:54;  Start 5/26/18 at 21:15


Multi-Ingredient Ointment (Analgesic Balm) 1 bob PRN QID  PRN TP MUSCLE PAIN;  

Start 5/26/18 at 21:15


Al Hydroxide/Mg Hydroxide (Mylanta Plus Xs) 15 ml PRN AFTMEALHC  PRN PO 

DYSPEPSIA Last administered on 5/27/18at 03:43;  Start 5/26/18 at 21:15


Magnesium Hydroxide (Milk Of Magnesia) 2,400 mg PRN QHS  PRN PO CONSTIPATION;  

Start 5/26/18 at 21:15


Mirtazapine (Remeron) 7.5 mg QHS PO  Last administered on 6/1/18at 19:52;  

Start 5/26/18 at 22:00;  Stop 6/2/18 at 18:13;  Status DC


Olanzapine (ZyPREXA ZYDIS) 5 mg PRN Q2HR  PRN PO PSYCHOSIS Last administered on 

6/2/18at 10:11;  Start 5/26/18 at 21:15


Baclofen (Lioresal) 10 mg BID PO  Last administered on 6/5/18at 19:17;  Start 5/ 26/18 at 22:00


Lithium Carbonate 150 mg DAILY PO  Last administered on 6/5/18at 09:33;  Start 5 /27/18 at 09:00


Lithium Carbonate 300 mg QHS PO  Last administered on 6/5/18at 19:17;  Start 5/ 26/18 at 22:00


Lorazepam (Ativan) 0.5 mg PRN TID  PRN PO ANXIETY / AGITATION Last administered 

on 6/1/18at 10:54;  Start 5/26/18 at 21:30


Risperidone (RisperDAL) 1.5 mg QHS PO  Last administered on 6/5/18at 19:17;  

Start 5/26/18 at 22:00


Trazodone HCl (Desyrel) 50 mg PRN QHS  PRN PO INSOMNIA, MAY REPEAT X1 Last 

administered on 6/5/18at 19:21;  Start 5/26/18 at 21:30


Vitamin D (Vitamin D3) 5,000 unit DAILY PO  Last administered on 5/29/18at 07:54

;  Start 5/27/18 at 09:00;  Stop 5/29/18 at 15:34;  Status DC


Multivitamins/ Calcium (Thera-M Plus) 1 tab DAILY PO  Last administered on 6/5/ 18at 09:33;  Start 5/27/18 at 09:00


Neomycin/ Polymyxin/ Bacitracin (Triple Antibiotic Ointment) 1 pkt PRN DAILY  

PRN TP Wound Healing;  Start 5/26/18 at 21:15


Simethicone (Gas-X) 80 mg PRN AFTMEALHC  PRN PO GAS / BLOATING;  Start 5/26/18 

at 21:15


Ascorbic Acid (Vitamin C) 500 mg DAILY PO  Last administered on 6/5/18at 09:33;

  Start 5/27/18 at 09:00


Bisacodyl (Dulcolax Supp) 10 mg PRN DAILY  PRN PA CONSTIPATION;  Start 5/26/18 

at 21:30


Cetirizine HCl (ZyrTEC) 10 mg DAILY PO  Last administered on 6/5/18at 09:33;  

Start 5/27/18 at 09:00


Docusate Sodium (Colace) 100 mg PRN BID  PRN PO CONSTIPATION;  Start 5/26/18 at 

21:30


Famotidine (Pepcid) 20 mg BIDBFRMEAL PO  Last administered on 5/27/18at 07:49;  

Start 5/27/18 at 07:30;  Stop 5/27/18 at 07:53;  Status DC


Finasteride (Proscar) 5 mg DAILY PO  Last administered on 6/5/18at 09:33;  

Start 5/27/18 at 09:00


Lactobacillus Rhamnosus (Culturelle) 1 cap BID PO  Last administered on 6/5/ 18at 19:16;  Start 5/26/18 at 22:00


Oxycodone HCl (Roxicodone) 5 mg PRN Q6HRS  PRN PO PAIN Last administered on 6/5/ 18at 06:44;  Start 5/26/18 at 21:30


Polyethylene Glycol (miraLAX) 17 gm BID PO  Last administered on 6/5/18at 19:16

;  Start 5/26/18 at 22:00


Sennosides (Senna) 8.6 mg BID PO  Last administered on 6/5/18at 19:17;  Start 5/ 26/18 at 22:00


Terazosin HCl (Hytrin) 10 mg QHS PO  Last administered on 6/5/18at 19:17;  

Start 5/26/18 at 22:00


Ondansetron HCl (Zofran Odt) 4 mg PRN Q8HRS  PRN PO NAUSEA/VOMITING;  Start 5/26 /18 at 21:30


Famotidine (Pepcid) 20 mg BID PO  Last administered on 6/5/18at 19:17;  Start 5/ 27/18 at 21:00


Levothyroxine Sodium (Synthroid) 50 mcg DAILY06 PO  Last administered on 6/5/ 18at 06:12;  Start 5/28/18 at 06:00


Vitamin D (Vitamin D3) 50,000 unit WEEKLY PO  Last administered on 5/30/18at 08:

48;  Start 5/30/18 at 09:00


Mirtazapine (Remeron) 15 mg QHS PO  Last administered on 6/5/18at 19:16;  Start 

6/2/18 at 21:00





Active Scripts


Active


Reported


Zofran Odt (Ondansetron) 4 Mg Tab.rapdis 4 Mg PO PRN Q6HRS PRN


Simethicone 80 Mg Tab.chew 80 Mg PO PRN AFTMEALHC PRN


Thera M Plus Tablet (Multivits,Ca,Minerals/Iron/FA) 1 Each Tablet 1 Tab PO DAILY


Mirtazapine 7.5 Mg Tablet 7.5 Mg PO QHS


Milk Of Magnesia (Magnesium Hydroxide) 400 Mg/5 Ml Oral.susp 2,400 Mg PO PRN 

DAILY PRN


Culturelle (Lactobacillus Rhamnosus Gg) 1 Each Capsule 1 Each PO BID


Vitamin D3 (Cholecalciferol (Vitamin D3)) 1,000 Unit Tablet 5,000 Unit PO DAILY


Zyrtec (Cetirizine Hcl) 10 Mg Tablet 10 Mg PO DAILY


Vitamin C (Ascorbate Calcium) 500 Mg Tablet 500 Mg PO DAILY


Trazodone Hcl 50 Mg Tablet 50 Mg PO PRN QHS PRN


Risperdal (Risperidone) 1 Mg Tablet 1.5 Mg PO QHS


Zyprexa Zydis (Olanzapine) 5 Mg Tab.rapdis 5 Mg PO PRN Q2HR PRN


Lithium Carbonate 150 Mg Capsule 150 Mg PO DAILY


Miralax (Polyethylene Glycol 3350) 17 Gm Powd.pack 17 Gm PO BID


Triple Antibiotic Ointment (Neomy Sulf/Bacitra/Polymyxin B) 1 Each Packet 1 

Packet TP PRN DAILY PRN


Oxycodone Hcl 5 Mg Capsule 5 Mg PO PRN Q6HRS PRN


Lorazepam 0.5 Mg Tablet 0.5 Mg PO TID PRN PRN


Famotidine 20 Mg Tablet 20 Mg PO BIDBFRMEAL


Terazosin Hcl 10 Mg Capsule 10 Mg PO DAILY


Lithium Carbonate 300 Mg Capsule 300 Mg PO HS


Baclofen 10 Mg Tablet 10 Mg PO BID


Maalox Maximum Strength Susp (Mag Hydrox/Al Hydrox/Simeth) 355 Ml Oral.susp 30 

Ml PO PRN Q2HR PRN


Finasteride 5 Mg Tablet 5 Mg PO DAILY


Colace (Docusate Sodium) 100 Mg Capsule 100 Mg PO PRN BID PRN


Senokot (Sennosides) 8.6 Mg Tablet 8.6 Mg PO BID


Bisacodyl 10 Mg Supp.rect 10 Mg RC PRN DAILY PRN


Tylenol (Acetaminophen) 325 Mg Tablet 650 Mg PO PRN Q6HRS PRN


I have reviewed the current psychotropics carefully including drug 

interactions.  Risk benefit ratio favors no change other than as noted in my 

dictated progress note.





Diagnosis:


Problems:  


(1) Anxiety disorder


(2) Bipolar affective, mixed, sev w/ psych


(3) Dementia, vascular, with delusions


(4) Mild cognitive impairment


(5) Osteomyelitis of hand, left, acute


(6) Quadriplegia











DIANA STALLINGS MD Jun 5, 2018 20:50

## 2018-06-05 NOTE — NUR
AYESHA spoke w/Ivory and Martina w/[facility] regarding requested CARE for Level II as this is pt's 
2nd admit to this facility after dc to medical floor for amputation of finger.  Due to pt's 
increased need for medical care and improved mental stability, pt will not be triggering for 
a Level II evaluation.  Pt's recent Level I CARE Assessment will remain current.

## 2018-06-05 NOTE — NUR
call placed to Dr nance,s office inquiring as to when sutures in hand are to be removed 
Dr. Nance wants sutures removed on 06/11 or 06/12 and document removal.

## 2018-06-05 NOTE — NUR
Pt in bed restless, yelling out. Pt c/o severe all over pain. PRN Oxycodone administered as 
ordered at this time.

## 2018-06-05 NOTE — PN
DATE:  06/03/2018



This late entry 06/03/2018 covers elements not covered in my initial note

06/03/2018.  Met with the patient in the afternoon.  Also met with the patient's

wife and brother who were visiting him.  They are both very happy and pleased

that the patient is doing so much better.  They remarked how the patient was

cognitively much more intact, oriented, recognized all of them, asked them

relevant questions as opposed to when he was first admitted here with marked

confusion, agitation, psychotic symptoms.



REVIEW OF SYSTEMS:  Ambulation impaired.  No CV, , pulmonary, eye, ENT system

symptoms on review.  Reliability fair.



MENTAL STATUS EXAM:  Oriented to himself and situation.  Speech is coherent,

abstraction fair, computation impaired, language function intact, attention span

short.  Mood and affect showing improvement.



LABORATORY DATA:  Reviewed.



IMPRESSION:  Bipolar I disorder, mixed with psychotic features, in partial

remission; cognitive disorder, unspecified.



PLAN:  Continue psychotropics mentioned in my initial note.





______________________________

DIANA STALLINGS MD



DR:  BRIDGETT/timbo  JOB#:  [id number] / [id number]

DD:  06/04/2018 22:33  DT:  06/05/2018 08:31

## 2018-06-05 NOTE — NUR
AYESHA [facility] Malcolm @ [phone] ext 
[phone] requesting updates on current progress of pt's [facility] Contract for [facility] placement.  



AYESHA then contacted pt's brother, Alli to update on progress of current placement.  AYESHA faxed 
updated admission information to [facility] and [facility] as they are both [facility] Contracted 
facilities.  AYESHA will f/u kaylynn/Alli later in the week if either facility is able to accept pt.  
AYESHA was told by Jacque Fowler [facility] may be able to expedite the contract if there is a 
facility able to accept pt.

## 2018-06-05 NOTE — PN
DATE:  05/31/2018



This is a late entry for 05/31, covers elements not covered in my initial note

05/31.



SUBJECTIVE:  I met with the patient in the evening.  The patient was staffed at

a treatment team meeting with the entire team in the morning.  The patient slept

5-1/2 hours.  He is receiving physical therapy, but remains in a Broda chair,

spends much time in his room.  Previous evening, he talked about having a

nightmare of seeing wolves.  He may need level 2 placement or placement ____

contract.  We will defer to social service to help coordinate this.  Received

trazodone at 1:15 a.m.



REVIEW OF SYSTEMS:  Ambulation impaired, in a Broda chair.  No CV, ,

pulmonary, eye, ENT system symptoms on review.



MENTAL STATUS EXAM:  The patient is much more oriented.  Speech has some

latency, coherent.  Abstraction fair, computation impaired, language function

intact, attention span short.  Mood and affect, lability is improved.  He is

oriented to himself, situation and place, able to do 1 step on serial 7's.



LABORATORY DATA:  Reviewed.



IMPRESSION:  Unchanged from initial note bipolar 1 disorder, mixed with

psychotic features, in partial remission; cognitive disorder, unspecified;

anxiety disorder, unspecified.



PLAN:  Continue current psychotropics including lithium, Risperdal, Remeron,

trazodone, along with Ativan.





______________________________

MAN COBY STALLINGS MD



DR:  BRIDGETT/timbo  JOB#:  [id number] / [id number]

DD:  06/04/2018 17:39  DT:  06/05/2018 06:17

## 2018-06-05 NOTE — NUR
Behavior Intervention Response and Plan:

BIRP Note:

Behavior: Assumed Care of patient, patient located in Patient Room at shift change.  Patient 
exhibited the following behavior Restless, Cooperative, Defensive.  Brief assessment on 
rounds of vital signs, medication needs, lab studies, and pain.  Treatment plan problems 1 
and 2.



Intervention: Patient assessed and the following interventions initiated safety checks 15 
Minute Checks Cognitive Assessment , Head to toe Assessment , Medications.



Response: After interactions and interventions patient responded in the following manner, 
Calm , Compliant ,Cooperative.  Continue to assess behaviors and condition will continue to 
monitor throughout the shift as needed. Patient educated on ADL's, and hand hygiene.



Plan: Continue to monitor Master Treatment Plan for patient's progress toward short term 
goals of Improved Mood, No harm To self/ others, long term goals to return to previous 
living setting vs placement.   Continue to assess patient for changes in above assessment.  
Monitor for medication needs, pain, and safety concerns.  Hourly rounding performed to 
ensure safe environment.

## 2018-06-06 VITALS — DIASTOLIC BLOOD PRESSURE: 69 MMHG | SYSTOLIC BLOOD PRESSURE: 130 MMHG

## 2018-06-06 VITALS — SYSTOLIC BLOOD PRESSURE: 101 MMHG | DIASTOLIC BLOOD PRESSURE: 58 MMHG

## 2018-06-06 RX ADMIN — POLYETHYLENE GLYCOL 3350 SCH GM: 17 POWDER, FOR SOLUTION ORAL at 19:16

## 2018-06-06 RX ADMIN — TRAZODONE HYDROCHLORIDE PRN MG: 50 TABLET, FILM COATED ORAL at 19:20

## 2018-06-06 RX ADMIN — POLYETHYLENE GLYCOL 3350 SCH GM: 17 POWDER, FOR SOLUTION ORAL at 09:14

## 2018-06-06 RX ADMIN — BACLOFEN SCH MG: 10 TABLET ORAL at 19:16

## 2018-06-06 RX ADMIN — MIRTAZAPINE SCH MG: 15 TABLET, FILM COATED ORAL at 19:16

## 2018-06-06 RX ADMIN — Medication SCH CAP: at 09:15

## 2018-06-06 RX ADMIN — SENNOSIDES A AND B SCH MG: 8.6 TABLET, FILM COATED ORAL at 09:14

## 2018-06-06 RX ADMIN — RISPERIDONE SCH MG: 0.5 TABLET ORAL at 19:16

## 2018-06-06 RX ADMIN — OXYCODONE HYDROCHLORIDE AND ACETAMINOPHEN SCH MG: 500 TABLET ORAL at 09:15

## 2018-06-06 RX ADMIN — Medication SCH CAP: at 19:16

## 2018-06-06 RX ADMIN — LITHIUM CARBONATE SCH MG: 150 CAPSULE, GELATIN COATED ORAL at 09:15

## 2018-06-06 RX ADMIN — LITHIUM CARBONATE SCH MG: 150 CAPSULE, GELATIN COATED ORAL at 19:16

## 2018-06-06 RX ADMIN — BACLOFEN SCH MG: 10 TABLET ORAL at 09:14

## 2018-06-06 RX ADMIN — TERAZOSIN HYDROCHLORIDE SCH MG: 5 CAPSULE ORAL at 19:17

## 2018-06-06 RX ADMIN — FAMOTIDINE SCH MG: 20 TABLET ORAL at 09:14

## 2018-06-06 RX ADMIN — FINASTERIDE SCH MG: 5 TABLET, FILM COATED ORAL at 09:15

## 2018-06-06 RX ADMIN — MULTIPLE VITAMINS W/ MINERALS TAB SCH TAB: TAB at 09:15

## 2018-06-06 RX ADMIN — LEVOTHYROXINE SODIUM SCH MCG: 50 TABLET ORAL at 05:15

## 2018-06-06 RX ADMIN — FAMOTIDINE SCH MG: 20 TABLET ORAL at 19:16

## 2018-06-06 RX ADMIN — SENNOSIDES A AND B SCH MG: 8.6 TABLET, FILM COATED ORAL at 19:16

## 2018-06-06 RX ADMIN — CHOLECALCIFEROL CAP 1.25 MG (50000 UNIT) SCH UNIT: 1.25 CAP at 09:00

## 2018-06-06 RX ADMIN — CETIRIZINE HYDROCHLORIDE SCH MG: 10 TABLET, FILM COATED ORAL at 09:14

## 2018-06-06 RX ADMIN — ACETAMINOPHEN PRN MG: 325 TABLET, FILM COATED ORAL at 05:18

## 2018-06-06 NOTE — NUR
Behavior Intervention Response and Plan:

BIRP Note:

Behavior: Assumed Care of patient, patient located in Day Room at shift change.  Patient 
exhibited the following behavior Calm, Interactive, Cooperative.  Brief assessment on rounds 
of vital signs, medication needs, lab studies, and pain.  Treatment plan problems 1 and 2.



Intervention: Patient assessed and the following interventions initiated safety checks 15 
Minute Checks Cognitive Assessment , Head to toe Assessment , Medications.



Response: After interactions and interventions patient responded in the following manner, 
Calm , Compliant ,Cooperative.  Continue to assess behaviors and condition will continue to 
monitor throughout the shift as needed. Patient educated on ADL's, and hand hygiene.



Plan: Continue to monitor Master Treatment Plan for patient's progress toward short term 
goals of No harm To self/ others, Improved Mood, long term goals to return to previous 
living setting vs placement.   Continue to assess patient for changes in above assessment.  
Monitor for medication needs, pain, and safety concerns.  Hourly rounding performed to 
ensure safe environment.

## 2018-06-06 NOTE — NUR
Behavior Intervention Response and Plan:

BIRP Note:

Behavior: Assumed Care of patient, patient located in Patient Room at shift change.  Patient 
exhibited the following behavior Restless, Interactive, Disorganized.  Brief assessment on 
rounds of vital signs, medication needs, lab studies, and pain.  Treatment plan problems 
1-2.



Intervention: Patient assessed and the following interventions initiated safety checks 15 
Minute Checks Cognitive Assessment , Head to toe Assessment , Medications.



Response: After interactions and interventions patient responded in the following manner, 
Restless , Interactive ,Disorganized.  Continue to assess behaviors and condition will 
continue to monitor throughout the shift as needed. Patient educated on ADL's, and hand 
hygiene.



Plan: Continue to monitor Master Treatment Plan for patient's progress toward short term 
goals of Medication Compliance, No harm To self/ others, long term goals to return to 
previous living setting vs placement.   Continue to assess patient for changes in above 
assessment.  Monitor for medication needs, pain, and safety concerns.  Hourly rounding 
performed to ensure safe environment.

## 2018-06-06 NOTE — NUR
Wound Care:

Patient seen per wound care follow up regarding buttock pressure ulcer. Pt has stage III to 
bilateral buttocks. Pt also has surgical incision to distal portion of left 5th finger. 
Cleansed and measured wounds.  Recommendations for Hydrocolloid to bilateral buttock and 
cover with Aquacel foam changing every 4 days or sooner if becomes soiled.  Pt is on P500 
bed and should be left to right turns only. To the left 5th finger applied Xeroform gauze 
and wrapped with kerlix. No other wounds noted on complete head to toe assessment.  Pt 
turned to the right side at this time. Floated heels and educated pt on pressure ulcer 
prevention. WC will continue to follow patient regarding wound care.

## 2018-06-06 NOTE — NUR
AYESHA left a 2nd message for Mary Alice Jensen, [facility] covering for [facility] Beckmarybeth this week. AYESHA is 
attempting to gather additional information regarding [facility] contract approval as Jacque's 
voicemail to this SW at the end of last week was it was still being processed, but may be 
expedited if a facility was willing to accept pt. 



[facility] will be out to assess pt on Thursday for possible admit upon [facility] contract 
approval.

## 2018-06-06 NOTE — NUR
SW met w/pt to discuss dc plans.  Pt was in very good spirits, reported feeling much better 
and was thankful to be alive.  Pt stated he realizes he still yells out at times and has bad 
days, but hopes staff realizes he is in pain.  AYESHA validated pt and told pt there is not a 
call light and no other way to notify staff he is in need of anything other then to call out 
for staff, so yelling out for staff is just fine as he is confined to his bed.  Pt laughed 
and showed SW his hands (retracted) and stated he could not push a call light anyways!  SW 
reported to pt SW was working on placement and will get him transitioned just as soon as the 
[facility] is able to [facility] the [facility] contract - again laughing w/the pt because "we all know how 
fast the [facility] works at anything!".  Pt was very appreciative of SW helping the pt w/placement 
and the [facility] contract.  AYESHA told pt SW had talked w/pt's brother recently and would continue to 
update pt and his family as more news is made available.  Pt spoke very lovingly about 
seeing his brother and wife during a visit recently.  AYESHA will continue to update pt 
regarding placement.

## 2018-06-06 NOTE — NUR
WEEKLY THERAPEUTIC RECREATION NOTE

Date of Admission: 5/26/2018

Date of AT Assessment: 5/29/2018

Goal aimed:  to increase stimulation

Initial goal: Pt. will participate in at least one group a day. 

Weekly progress towards goal:  on track

Group participation level:  minimal

Behaviors observed: Spent most of his time in his room, resting listening to music

Plan:   No change to goal.

## 2018-06-06 NOTE — PDOC
Exam


Note:


Shai Note:


Please also refer to the separate dictated note~for this date of service 

dictated separately.~Patient seen individually. Discussed the patient with 

Nursing staff reviewed the chart.~Reviewed interim history and current 

functioning. Reviewed vital signs,~Labs/ Radiology~and current medications 

noted below. Continue current treatment with the changes noted in the dictated 

addendum note





Assessment:


Vital Signs:





 Vital Signs








  Date Time  Temp Pulse Resp B/P (MAP) Pulse Ox O2 Delivery O2 Flow Rate FiO2


 


6/6/18 19:17  74  130/69    


 


6/6/18 16:14 98.6  16  96   


 


6/5/18 22:46      Room Air  








I&O











Intake and Output 


 


 6/6/18





 07:00


 


Intake Total 2680 ml


 


Output Total 1450 ml


 


Balance 1230 ml


 


 


 


Intake Oral 2680 ml


 


Output Urine Total 1450 ml











Current Medications:


Meds:





Current Medications


Acetaminophen (Tylenol) 650 mg PRN Q6HRS  PRN PO PAIN / TEMP Last administered 

on 6/6/18at 05:18;  Start 5/26/18 at 21:15


Multi-Ingredient Ointment (Analgesic Balm) 1 bob PRN QID  PRN TP MUSCLE PAIN;  

Start 5/26/18 at 21:15


Al Hydroxide/Mg Hydroxide (Mylanta Plus Xs) 15 ml PRN AFTMEALHC  PRN PO 

DYSPEPSIA Last administered on 5/27/18at 03:43;  Start 5/26/18 at 21:15


Magnesium Hydroxide (Milk Of Magnesia) 2,400 mg PRN QHS  PRN PO CONSTIPATION;  

Start 5/26/18 at 21:15


Mirtazapine (Remeron) 7.5 mg QHS PO  Last administered on 6/1/18at 19:52;  

Start 5/26/18 at 22:00;  Stop 6/2/18 at 18:13;  Status DC


Olanzapine (ZyPREXA ZYDIS) 5 mg PRN Q2HR  PRN PO PSYCHOSIS Last administered on 

6/2/18at 10:11;  Start 5/26/18 at 21:15


Baclofen (Lioresal) 10 mg BID PO  Last administered on 6/6/18at 19:16;  Start 5/ 26/18 at 22:00


Lithium Carbonate 150 mg DAILY PO  Last administered on 6/6/18at 09:15;  Start 5 /27/18 at 09:00


Lithium Carbonate 300 mg QHS PO  Last administered on 6/6/18at 19:16;  Start 5/ 26/18 at 22:00


Lorazepam (Ativan) 0.5 mg PRN TID  PRN PO ANXIETY / AGITATION Last administered 

on 6/1/18at 10:54;  Start 5/26/18 at 21:30


Risperidone (RisperDAL) 1.5 mg QHS PO  Last administered on 6/6/18at 19:16;  

Start 5/26/18 at 22:00


Trazodone HCl (Desyrel) 50 mg PRN QHS  PRN PO INSOMNIA, MAY REPEAT X1 Last 

administered on 6/6/18at 19:20;  Start 5/26/18 at 21:30


Vitamin D (Vitamin D3) 5,000 unit DAILY PO  Last administered on 5/29/18at 07:54

;  Start 5/27/18 at 09:00;  Stop 5/29/18 at 15:34;  Status DC


Multivitamins/ Calcium (Thera-M Plus) 1 tab DAILY PO  Last administered on 6/6/ 18at 09:15;  Start 5/27/18 at 09:00


Neomycin/ Polymyxin/ Bacitracin (Triple Antibiotic Ointment) 1 pkt PRN DAILY  

PRN TP Wound Healing;  Start 5/26/18 at 21:15


Simethicone (Gas-X) 80 mg PRN AFTMEALHC  PRN PO GAS / BLOATING;  Start 5/26/18 

at 21:15


Ascorbic Acid (Vitamin C) 500 mg DAILY PO  Last administered on 6/6/18at 09:15;

  Start 5/27/18 at 09:00


Bisacodyl (Dulcolax Supp) 10 mg PRN DAILY  PRN MD CONSTIPATION;  Start 5/26/18 

at 21:30


Cetirizine HCl (ZyrTEC) 10 mg DAILY PO  Last administered on 6/6/18at 09:14;  

Start 5/27/18 at 09:00


Docusate Sodium (Colace) 100 mg PRN BID  PRN PO CONSTIPATION;  Start 5/26/18 at 

21:30


Famotidine (Pepcid) 20 mg BIDBFRMEAL PO  Last administered on 5/27/18at 07:49;  

Start 5/27/18 at 07:30;  Stop 5/27/18 at 07:53;  Status DC


Finasteride (Proscar) 5 mg DAILY PO  Last administered on 6/6/18at 09:15;  

Start 5/27/18 at 09:00


Lactobacillus Rhamnosus (Culturelle) 1 cap BID PO  Last administered on 6/6/ 18at 19:16;  Start 5/26/18 at 22:00


Oxycodone HCl (Roxicodone) 5 mg PRN Q6HRS  PRN PO PAIN Last administered on 6/5/ 18at 21:46;  Start 5/26/18 at 21:30


Polyethylene Glycol (miraLAX) 17 gm BID PO  Last administered on 6/6/18at 19:16

;  Start 5/26/18 at 22:00


Sennosides (Senna) 8.6 mg BID PO  Last administered on 6/6/18at 19:16;  Start 5/ 26/18 at 22:00


Terazosin HCl (Hytrin) 10 mg QHS PO  Last administered on 6/6/18at 19:17;  

Start 5/26/18 at 22:00


Ondansetron HCl (Zofran Odt) 4 mg PRN Q8HRS  PRN PO NAUSEA/VOMITING;  Start 5/26 /18 at 21:30


Famotidine (Pepcid) 20 mg BID PO  Last administered on 6/6/18at 19:16;  Start 5/ 27/18 at 21:00


Levothyroxine Sodium (Synthroid) 50 mcg DAILY06 PO  Last administered on 6/6/ 18at 05:15;  Start 5/28/18 at 06:00


Vitamin D (Vitamin D3) 50,000 unit WEEKLY PO  Last administered on 6/6/18at 09:

00;  Start 5/30/18 at 09:00


Mirtazapine (Remeron) 15 mg QHS PO  Last administered on 6/6/18at 19:16;  Start 

6/2/18 at 21:00





Active Scripts


Active


Reported


Zofran Odt (Ondansetron) 4 Mg Tab.rapdis 4 Mg PO PRN Q6HRS PRN


Simethicone 80 Mg Tab.chew 80 Mg PO PRN AFTMEALHC PRN


Thera M Plus Tablet (Multivits,Ca,Minerals/Iron/FA) 1 Each Tablet 1 Tab PO DAILY


Mirtazapine 7.5 Mg Tablet 7.5 Mg PO QHS


Milk Of Magnesia (Magnesium Hydroxide) 400 Mg/5 Ml Oral.susp 2,400 Mg PO PRN 

DAILY PRN


Culturelle (Lactobacillus Rhamnosus Gg) 1 Each Capsule 1 Each PO BID


Vitamin D3 (Cholecalciferol (Vitamin D3)) 1,000 Unit Tablet 5,000 Unit PO DAILY


Zyrtec (Cetirizine Hcl) 10 Mg Tablet 10 Mg PO DAILY


Vitamin C (Ascorbate Calcium) 500 Mg Tablet 500 Mg PO DAILY


Trazodone Hcl 50 Mg Tablet 50 Mg PO PRN QHS PRN


Risperdal (Risperidone) 1 Mg Tablet 1.5 Mg PO QHS


Zyprexa Zydis (Olanzapine) 5 Mg Tab.rapdis 5 Mg PO PRN Q2HR PRN


Lithium Carbonate 150 Mg Capsule 150 Mg PO DAILY


Miralax (Polyethylene Glycol 3350) 17 Gm Powd.pack 17 Gm PO BID


Triple Antibiotic Ointment (Neomy Sulf/Bacitra/Polymyxin B) 1 Each Packet 1 

Packet TP PRN DAILY PRN


Oxycodone Hcl 5 Mg Capsule 5 Mg PO PRN Q6HRS PRN


Lorazepam 0.5 Mg Tablet 0.5 Mg PO TID PRN PRN


Famotidine 20 Mg Tablet 20 Mg PO BIDBFRMEAL


Terazosin Hcl 10 Mg Capsule 10 Mg PO DAILY


Lithium Carbonate 300 Mg Capsule 300 Mg PO HS


Baclofen 10 Mg Tablet 10 Mg PO BID


Maalox Maximum Strength Susp (Mag Hydrox/Al Hydrox/Simeth) 355 Ml Oral.susp 30 

Ml PO PRN Q2HR PRN


Finasteride 5 Mg Tablet 5 Mg PO DAILY


Colace (Docusate Sodium) 100 Mg Capsule 100 Mg PO PRN BID PRN


Senokot (Sennosides) 8.6 Mg Tablet 8.6 Mg PO BID


Bisacodyl 10 Mg Supp.rect 10 Mg RC PRN DAILY PRN


Tylenol (Acetaminophen) 325 Mg Tablet 650 Mg PO PRN Q6HRS PRN


I have reviewed the current psychotropics carefully including drug 

interactions.  Risk benefit ratio favors no change other than as noted in my 

dictated progress note.





Diagnosis:


Problems:  


(1) Anxiety disorder


(2) Bipolar affective, mixed, sev w/ psych


(3) Dementia, vascular, with delusions


(4) Mild cognitive impairment


(5) Osteomyelitis of hand, left, acute


(6) Quadriplegia











DIANA STALLINGS MD Jun 6, 2018 20:45

## 2018-06-07 VITALS — DIASTOLIC BLOOD PRESSURE: 64 MMHG | SYSTOLIC BLOOD PRESSURE: 129 MMHG

## 2018-06-07 VITALS — DIASTOLIC BLOOD PRESSURE: 51 MMHG | SYSTOLIC BLOOD PRESSURE: 108 MMHG

## 2018-06-07 RX ADMIN — Medication SCH CAP: at 07:49

## 2018-06-07 RX ADMIN — CETIRIZINE HYDROCHLORIDE SCH MG: 10 TABLET, FILM COATED ORAL at 07:49

## 2018-06-07 RX ADMIN — Medication SCH CAP: at 19:37

## 2018-06-07 RX ADMIN — MULTIPLE VITAMINS W/ MINERALS TAB SCH TAB: TAB at 07:49

## 2018-06-07 RX ADMIN — TERAZOSIN HYDROCHLORIDE SCH MG: 5 CAPSULE ORAL at 19:40

## 2018-06-07 RX ADMIN — LITHIUM CARBONATE SCH MG: 150 CAPSULE, GELATIN COATED ORAL at 07:49

## 2018-06-07 RX ADMIN — OXYCODONE HYDROCHLORIDE AND ACETAMINOPHEN SCH MG: 500 TABLET ORAL at 07:49

## 2018-06-07 RX ADMIN — ACETAMINOPHEN PRN MG: 325 TABLET, FILM COATED ORAL at 21:31

## 2018-06-07 RX ADMIN — ACETAMINOPHEN PRN MG: 325 TABLET, FILM COATED ORAL at 03:11

## 2018-06-07 RX ADMIN — FAMOTIDINE SCH MG: 20 TABLET ORAL at 07:49

## 2018-06-07 RX ADMIN — FAMOTIDINE SCH MG: 20 TABLET ORAL at 19:38

## 2018-06-07 RX ADMIN — POLYETHYLENE GLYCOL 3350 SCH GM: 17 POWDER, FOR SOLUTION ORAL at 07:49

## 2018-06-07 RX ADMIN — BACLOFEN SCH MG: 10 TABLET ORAL at 07:49

## 2018-06-07 RX ADMIN — POLYETHYLENE GLYCOL 3350 SCH GM: 17 POWDER, FOR SOLUTION ORAL at 19:38

## 2018-06-07 RX ADMIN — FINASTERIDE SCH MG: 5 TABLET, FILM COATED ORAL at 07:49

## 2018-06-07 RX ADMIN — BACLOFEN SCH MG: 10 TABLET ORAL at 19:37

## 2018-06-07 RX ADMIN — MIRTAZAPINE SCH MG: 15 TABLET, FILM COATED ORAL at 19:37

## 2018-06-07 RX ADMIN — LEVOTHYROXINE SODIUM SCH MCG: 50 TABLET ORAL at 05:29

## 2018-06-07 RX ADMIN — RISPERIDONE SCH MG: 0.5 TABLET ORAL at 19:37

## 2018-06-07 RX ADMIN — SENNOSIDES A AND B SCH MG: 8.6 TABLET, FILM COATED ORAL at 07:49

## 2018-06-07 RX ADMIN — LITHIUM CARBONATE SCH MG: 150 CAPSULE, GELATIN COATED ORAL at 19:37

## 2018-06-07 RX ADMIN — SENNOSIDES A AND B SCH MG: 8.6 TABLET, FILM COATED ORAL at 19:37

## 2018-06-07 NOTE — PDOC
Exam


Note:


Shai Note:


Please also refer to the separate dictated note~for this date of service 

dictated separately.~Patient seen individually. Discussed the patient with 

Nursing staff reviewed the chart.~Reviewed interim history and current 

functioning. Reviewed vital signs,~Labs/ Radiology~and current medications 

noted below. Continue current treatment with the changes noted in the dictated 

addendum note





Assessment:


Vital Signs:





 Vital Signs








  Date Time  Temp Pulse Resp B/P (MAP) Pulse Ox O2 Delivery O2 Flow Rate FiO2


 


6/7/18 19:40  65  105/60    


 


6/7/18 18:29   20     


 


6/7/18 16:18 98.6    100 Room Air  








I&O











Intake and Output 


 


 6/7/18





 07:00


 


Intake Total 2420 ml


 


Output Total 3050 ml


 


Balance -630 ml


 


 


 


Intake Oral 2420 ml


 


Output Urine Total 3050 ml


 


# Bowel Movements 1











Current Medications:


Meds:





Current Medications


Acetaminophen (Tylenol) 650 mg PRN Q6HRS  PRN PO PAIN / TEMP Last administered 

on 6/7/18at 21:31;  Start 5/26/18 at 21:15


Multi-Ingredient Ointment (Analgesic Balm) 1 bob PRN QID  PRN TP MUSCLE PAIN 

Last administered on 6/7/18at 21:24;  Start 5/26/18 at 21:15


Al Hydroxide/Mg Hydroxide (Mylanta Plus Xs) 15 ml PRN AFTMEALHC  PRN PO 

DYSPEPSIA Last administered on 5/27/18at 03:43;  Start 5/26/18 at 21:15


Magnesium Hydroxide (Milk Of Magnesia) 2,400 mg PRN QHS  PRN PO CONSTIPATION;  

Start 5/26/18 at 21:15


Mirtazapine (Remeron) 7.5 mg QHS PO  Last administered on 6/1/18at 19:52;  

Start 5/26/18 at 22:00;  Stop 6/2/18 at 18:13;  Status DC


Olanzapine (ZyPREXA ZYDIS) 5 mg PRN Q2HR  PRN PO PSYCHOSIS Last administered on 

6/2/18at 10:11;  Start 5/26/18 at 21:15


Baclofen (Lioresal) 10 mg BID PO  Last administered on 6/7/18at 19:37;  Start 5/ 26/18 at 22:00


Lithium Carbonate 150 mg DAILY PO  Last administered on 6/7/18at 07:49;  Start 5 /27/18 at 09:00


Lithium Carbonate 300 mg QHS PO  Last administered on 6/7/18at 19:37;  Start 5/ 26/18 at 22:00


Lorazepam (Ativan) 0.5 mg PRN TID  PRN PO ANXIETY / AGITATION Last administered 

on 6/1/18at 10:54;  Start 5/26/18 at 21:30


Risperidone (RisperDAL) 1.5 mg QHS PO  Last administered on 6/7/18at 19:37;  

Start 5/26/18 at 22:00


Trazodone HCl (Desyrel) 50 mg PRN QHS  PRN PO INSOMNIA, MAY REPEAT X1 Last 

administered on 6/6/18at 19:20;  Start 5/26/18 at 21:30


Vitamin D (Vitamin D3) 5,000 unit DAILY PO  Last administered on 5/29/18at 07:54

;  Start 5/27/18 at 09:00;  Stop 5/29/18 at 15:34;  Status DC


Multivitamins/ Calcium (Thera-M Plus) 1 tab DAILY PO  Last administered on 6/7/ 18at 07:49;  Start 5/27/18 at 09:00


Neomycin/ Polymyxin/ Bacitracin (Triple Antibiotic Ointment) 1 pkt PRN DAILY  

PRN TP Wound Healing;  Start 5/26/18 at 21:15


Simethicone (Gas-X) 80 mg PRN AFTMEALHC  PRN PO GAS / BLOATING;  Start 5/26/18 

at 21:15


Ascorbic Acid (Vitamin C) 500 mg DAILY PO  Last administered on 6/7/18at 07:49;

  Start 5/27/18 at 09:00


Bisacodyl (Dulcolax Supp) 10 mg PRN DAILY  PRN ND CONSTIPATION;  Start 5/26/18 

at 21:30


Cetirizine HCl (ZyrTEC) 10 mg DAILY PO  Last administered on 6/7/18at 07:49;  

Start 5/27/18 at 09:00


Docusate Sodium (Colace) 100 mg PRN BID  PRN PO CONSTIPATION;  Start 5/26/18 at 

21:30


Famotidine (Pepcid) 20 mg BIDBFRMEAL PO  Last administered on 5/27/18at 07:49;  

Start 5/27/18 at 07:30;  Stop 5/27/18 at 07:53;  Status DC


Finasteride (Proscar) 5 mg DAILY PO  Last administered on 6/7/18at 07:49;  

Start 5/27/18 at 09:00


Lactobacillus Rhamnosus (Culturelle) 1 cap BID PO  Last administered on 6/7/ 18at 19:37;  Start 5/26/18 at 22:00


Oxycodone HCl (Roxicodone) 5 mg PRN Q6HRS  PRN PO PAIN Last administered on 6/7/ 18at 16:28;  Start 5/26/18 at 21:30


Polyethylene Glycol (miraLAX) 17 gm BID PO  Last administered on 6/7/18at 19:38

;  Start 5/26/18 at 22:00


Sennosides (Senna) 8.6 mg BID PO  Last administered on 6/7/18at 19:37;  Start 5/ 26/18 at 22:00


Terazosin HCl (Hytrin) 10 mg QHS PO  Last administered on 6/6/18at 19:17;  

Start 5/26/18 at 22:00


Ondansetron HCl (Zofran Odt) 4 mg PRN Q8HRS  PRN PO NAUSEA/VOMITING;  Start 5/26 /18 at 21:30


Famotidine (Pepcid) 20 mg BID PO  Last administered on 6/7/18at 19:38;  Start 5/ 27/18 at 21:00


Levothyroxine Sodium (Synthroid) 50 mcg DAILY06 PO  Last administered on 6/7/ 18at 05:29;  Start 5/28/18 at 06:00


Vitamin D (Vitamin D3) 50,000 unit WEEKLY PO  Last administered on 6/6/18at 09:

00;  Start 5/30/18 at 09:00


Mirtazapine (Remeron) 15 mg QHS PO  Last administered on 6/7/18at 19:37;  Start 

6/2/18 at 21:00





Active Scripts


Active


Reported


Zofran Odt (Ondansetron) 4 Mg Tab.rapdis 4 Mg PO PRN Q6HRS PRN


Simethicone 80 Mg Tab.chew 80 Mg PO PRN AFTMEALHC PRN


Thera M Plus Tablet (Multivits,Ca,Minerals/Iron/FA) 1 Each Tablet 1 Tab PO DAILY


Mirtazapine 7.5 Mg Tablet 7.5 Mg PO QHS


Milk Of Magnesia (Magnesium Hydroxide) 400 Mg/5 Ml Oral.susp 2,400 Mg PO PRN 

DAILY PRN


Culturelle (Lactobacillus Rhamnosus Gg) 1 Each Capsule 1 Each PO BID


Vitamin D3 (Cholecalciferol (Vitamin D3)) 1,000 Unit Tablet 5,000 Unit PO DAILY


Zyrtec (Cetirizine Hcl) 10 Mg Tablet 10 Mg PO DAILY


Vitamin C (Ascorbate Calcium) 500 Mg Tablet 500 Mg PO DAILY


Trazodone Hcl 50 Mg Tablet 50 Mg PO PRN QHS PRN


Risperdal (Risperidone) 1 Mg Tablet 1.5 Mg PO QHS


Zyprexa Zydis (Olanzapine) 5 Mg Tab.rapdis 5 Mg PO PRN Q2HR PRN


Lithium Carbonate 150 Mg Capsule 150 Mg PO DAILY


Miralax (Polyethylene Glycol 3350) 17 Gm Powd.pack 17 Gm PO BID


Triple Antibiotic Ointment (Neomy Sulf/Bacitra/Polymyxin B) 1 Each Packet 1 

Packet TP PRN DAILY PRN


Oxycodone Hcl 5 Mg Capsule 5 Mg PO PRN Q6HRS PRN


Lorazepam 0.5 Mg Tablet 0.5 Mg PO TID PRN PRN


Famotidine 20 Mg Tablet 20 Mg PO BIDBFRMEAL


Terazosin Hcl 10 Mg Capsule 10 Mg PO DAILY


Lithium Carbonate 300 Mg Capsule 300 Mg PO HS


Baclofen 10 Mg Tablet 10 Mg PO BID


Maalox Maximum Strength Susp (Mag Hydrox/Al Hydrox/Simeth) 355 Ml Oral.susp 30 

Ml PO PRN Q2HR PRN


Finasteride 5 Mg Tablet 5 Mg PO DAILY


Colace (Docusate Sodium) 100 Mg Capsule 100 Mg PO PRN BID PRN


Senokot (Sennosides) 8.6 Mg Tablet 8.6 Mg PO BID


Bisacodyl 10 Mg Supp.rect 10 Mg RC PRN DAILY PRN


Tylenol (Acetaminophen) 325 Mg Tablet 650 Mg PO PRN Q6HRS PRN


I have reviewed the current psychotropics carefully including drug 

interactions.  Risk benefit ratio favors no change other than as noted in my 

dictated progress note.





Diagnosis:


Problems:  


(1) Anxiety disorder


(2) Bipolar affective, mixed, sev w/ psych


(3) Dementia, vascular, with delusions


(4) Mild cognitive impairment


(5) Osteomyelitis of hand, left, acute


(6) Quadriplegia











DIANA STALLINGS MD Jun 7, 2018 22:24

## 2018-06-07 NOTE — NUR
AYESHA received [facility] requesting assistance w/communicating w/pt's wife as 
she called to verify placement w/Mrs. Lawson and she stated she would not allow pt to go to 
[facility].  Mary Alice stated there were limited options for pt, and also verified what this 
AYESHA had already told Alli regarding certain facilities not accepting certain types of 
behaviors and care.  Mary Alice is requiring placement to be secured by late Friday morning as 
she is attempting to obtain permission to get a broda chair prior to dc for pt.  AYESHA provided 
phone number for Mary Alice to contact Alli to further discuss the importance of visiting [facility] 
and [facility]  Friday morning if at all possible to help make a decision.

## 2018-06-07 NOTE — NUR
DON from [facility] out to eval pt for possible admission.  They are able to accept pt as 
early as Friday, pending [facility] contract.



AYESHA received call from [facility] at [facility] stating they should have everything signed by the 
Chief  by noon tomorrow regarding the [facility] Contract.



AYESHA then contacted pt's brother, Alli to provide update on [facility] accepting pt for 
admit as early as Friday.  AYESHA stated dc could be planned for Monday to ensure enough time is 
provided for admission paperwork to be signed and belongings to be transferred.  Alli shared 
his concerns regarding both [facility] and [facility] having low ratings and felt 
uncomfortable having pt transition to a facility that may not be best suited for pt.  AYESHA 
validated Alli's concerns but also stated due to [facility]'s [facility] Contract as payor source, it 
severely limited options for facilities, and although there are 3 additional facilities in 
the  area, they would very likely not accept pt due to his behaviors and needs.  Alli 
understood this.  Alli asked for the weekend to tour [facility] and [facility] to 
determine how he felt about the facilities.  AYESHA stated pt is ready for dc and really needed 
to plan for dc on Monday.  



AYESHA then contacted [facility] and inquired about VA contract and they have one male bed available. 
 AYESHA will send referral for review.



AYESHA contacted Alli back and will provide information via email at [email or web address] for 
him to research this evening and encouraged him to tour along w/[facility] on Friday to 
make a determination for placement.

## 2018-06-07 NOTE — NUR
Nursing Notes

Pt in good spirits today, was even singing with me to a Fritz Caban song.  He knows all of 
the words and sings it really well, even is kind of chair dancing, and smiling ear to ear.  
He states that was super fun, thanks for singing with me I love Fritz Caban.  Med compliant 
and cooperative.

## 2018-06-08 VITALS — DIASTOLIC BLOOD PRESSURE: 64 MMHG | SYSTOLIC BLOOD PRESSURE: 116 MMHG

## 2018-06-08 VITALS — SYSTOLIC BLOOD PRESSURE: 125 MMHG | DIASTOLIC BLOOD PRESSURE: 69 MMHG

## 2018-06-08 RX ADMIN — LEVOTHYROXINE SODIUM SCH MCG: 50 TABLET ORAL at 07:21

## 2018-06-08 RX ADMIN — POLYETHYLENE GLYCOL 3350 SCH GM: 17 POWDER, FOR SOLUTION ORAL at 07:48

## 2018-06-08 RX ADMIN — BACLOFEN SCH MG: 10 TABLET ORAL at 21:18

## 2018-06-08 RX ADMIN — RISPERIDONE SCH MG: 0.5 TABLET ORAL at 21:18

## 2018-06-08 RX ADMIN — FINASTERIDE SCH MG: 5 TABLET, FILM COATED ORAL at 07:48

## 2018-06-08 RX ADMIN — MIRTAZAPINE SCH MG: 15 TABLET, FILM COATED ORAL at 21:23

## 2018-06-08 RX ADMIN — GABAPENTIN SCH MG: 100 CAPSULE ORAL at 21:19

## 2018-06-08 RX ADMIN — POLYETHYLENE GLYCOL 3350 SCH GM: 17 POWDER, FOR SOLUTION ORAL at 21:19

## 2018-06-08 RX ADMIN — SENNOSIDES A AND B SCH MG: 8.6 TABLET, FILM COATED ORAL at 21:19

## 2018-06-08 RX ADMIN — Medication SCH CAP: at 21:18

## 2018-06-08 RX ADMIN — MULTIPLE VITAMINS W/ MINERALS TAB SCH TAB: TAB at 07:48

## 2018-06-08 RX ADMIN — CETIRIZINE HYDROCHLORIDE SCH MG: 10 TABLET, FILM COATED ORAL at 07:48

## 2018-06-08 RX ADMIN — SENNOSIDES A AND B SCH MG: 8.6 TABLET, FILM COATED ORAL at 07:48

## 2018-06-08 RX ADMIN — Medication SCH CAP: at 07:48

## 2018-06-08 RX ADMIN — LITHIUM CARBONATE SCH MG: 150 CAPSULE, GELATIN COATED ORAL at 07:48

## 2018-06-08 RX ADMIN — TRAZODONE HYDROCHLORIDE PRN MG: 50 TABLET, FILM COATED ORAL at 01:11

## 2018-06-08 RX ADMIN — TERAZOSIN HYDROCHLORIDE SCH MG: 5 CAPSULE ORAL at 21:19

## 2018-06-08 RX ADMIN — ACETAMINOPHEN PRN MG: 325 TABLET, FILM COATED ORAL at 03:56

## 2018-06-08 RX ADMIN — LITHIUM CARBONATE SCH MG: 150 CAPSULE, GELATIN COATED ORAL at 21:19

## 2018-06-08 RX ADMIN — BACLOFEN SCH MG: 10 TABLET ORAL at 07:48

## 2018-06-08 RX ADMIN — FAMOTIDINE SCH MG: 20 TABLET ORAL at 07:48

## 2018-06-08 RX ADMIN — FAMOTIDINE SCH MG: 20 TABLET ORAL at 21:17

## 2018-06-08 RX ADMIN — OXYCODONE HYDROCHLORIDE AND ACETAMINOPHEN SCH MG: 500 TABLET ORAL at 07:48

## 2018-06-08 NOTE — PN
DATE:  06/07/2018



PSYCHIATRIC PROGRESS NOTE



This late entry 06/07/2018 covers elements not covered in my initial note

06/07/2018.



SUBJECTIVE:  I met with the patient in the evening and staffed at a treatment

team meeting with entire team in the morning.  Review of the patient's history,

diagnosis, progress, discharge plan possibly for 06/08/2018.  He got a new wound

dressing the day before.  He is wearing a glove over this hand.  He yells out at

times.  Social service staffs are coordinating with [facility]

[facility] or may be accepted to [facility] per social service

staff discussed at the morning treatment team meeting.  I met with him in the

evening.



REVIEW OF SYSTEMS:  Ambulation impaired, in Broda chair.  No CV, , pulmonary,

eye, ENT system symptoms on review.



MENTAL STATUS EXAM:  Oriented to himself and situation.  Speech coherent, less

pressured.  Abstraction fair, computation impaired.  Mood and affect is

improved.  No suicidal or homicidal ideation.



LABORATORIES:  Reviewed.



IMPRESSION:  Unchanged from initial note.



PLAN:  No change from a psychiatric standpoint.





______________________________

MAN COBY STALLINGS MD



DR:  BRIDGETT/timbo  JOB#:  [id number] / [id number]

DD:  06/08/2018 11:29  DT:  06/08/2018 18:40

## 2018-06-08 NOTE — NUR
Behavior Intervention Response and Plan:

BIRP Note:

Behavior: Assumed Care of patient, patient located in Patient Room at shift change.  Patient 
exhibited the following behavior Disorganized, Calm, Social.  Brief assessment on rounds of 
vital signs, medication needs, lab studies, and pain.  Treatment plan problems .



Intervention: Patient assessed and the following interventions initiated safety checks 15 
Minute Checks Cognitive Assessment , Head to toe Assessment , Medications.



Response: After interactions and interventions patient responded in the following manner, 
Disorganized , Interactive ,Cooperative.  Continue to assess behaviors and condition will 
continue to monitor throughout the shift as needed. Patient educated on ADL's, and hand 
hygiene.



Plan: Continue to monitor Master Treatment Plan for patient's progress toward short term 
goals of No harm To self/ others, Decreased Agitation, long term goals to return to previous 
living setting vs placement.   Continue to assess patient for changes in above assessment.  
Monitor for medication needs, pain, and safety concerns.  Hourly rounding performed to 
ensure safe environment.

## 2018-06-08 NOTE — PN
DATE:  06/06/2018



PSYCHIATRIC PROGRESS NOTE 



This is a late entry 06/06/2018 covers elements not covered in my initial note.



SUBJECTIVE:  I met with the patient in the evening.  The patient slept 4-1/4

hours.  He does get anxious at times, asks for help, screams out, but much

better.  Appetite is improved.  Cognitively, he is much clearer.



REVIEW OF SYSTEMS:  Ambulation impaired, in Broda chair.  No CV, , pulmonary,

eye system symptoms on review.



MENTAL STATUS EXAM:  Oriented to himself and situation.  Speech has some

latency, coherent, less pressured.  Abstraction fair, computation impaired,

language function intact, attention span short.  Mood and affect showing

improvement, improved lability.  



LABORATORY DATA:  Lithium level 0.8.



IMPRESSION:  Bipolar 1 disorder, mixed with psychotic features, in partial

remission; cognitive disorder, unspecified.



PLAN:  Continue current psychotropics.  Discussed with social service staff with

possible transition to nursing home by the end of the week.





______________________________

MAN COBY STALLINGS MD



DR:  BRIDGETT/timbo  JOB#:  [id number] / [id number]

DD:  06/08/2018 09:56  DT:  06/08/2018 12:26

## 2018-06-08 NOTE — NUR
Nursing Note

Patient given PRN Analgesic Balm@2124, PRN Tylenol@2131,PRN Oxycodone@2229. Patient Alert 
and oriented to self and location. Patient is calm and cooperative with assessment. Patient 
is interactive and pleasant during interaction. Patient requesting PRN pain medication for 
leg pain. Patient states that pain medications were effective. Patient remains in bed at 
this time.

## 2018-06-08 NOTE — NUR
pt up for meals. in pleasant spirits. compliant with meds and cares. [facility]

## 2018-06-08 NOTE — NUR
[facility]

Social Work Discharge Planning Form



Patient Name DILCIA STEINBERG

Admit Date: 5/26/18



DISCHARGE PLAN

Discharge Destination: new to [facility]



Care Assessment: complete

Level II Assessment: does not trigger



Transportation: Facility to transport 6/11/18 time TBD (11?)



DISCHARGE TO FACILITY

[facility]: [facility] Phone: [phone] Fax: [phone]



Address: [address] [place] [address]  [id number]



Contact Name: JOANA Burleson 



PCP: at facility w/in 7-10 days of dc             Psychiatrist: at facility w/in 7-10 days 
of dc

## 2018-06-08 NOTE — PDOC
Exam


Note:


Shai Note:


Please also refer to the separate dictated note~for this date of service 

dictated separately.~Patient seen individually. Discussed the patient with 

Nursing staff reviewed the chart.~Reviewed interim history and current 

functioning. Reviewed vital signs,~Labs/ Radiology~and current medications 

noted below. Continue current treatment with the changes noted in the dictated 

addendum note





Assessment:


Vital Signs:





 Vital Signs








  Date Time  Temp Pulse Resp B/P (MAP) Pulse Ox O2 Delivery O2 Flow Rate FiO2


 


6/8/18 21:19  62  125/69    


 


6/8/18 15:52 98.5  18  98   


 


6/7/18 23:29      Room Air  








I&O











Intake and Output 


 


 6/8/18





 07:00


 


Intake Total 1560 ml


 


Output Total 1700 ml


 


Balance -140 ml


 


 


 


Intake Oral 1560 ml


 


Output Urine Total 1700 ml











Current Medications:


Meds:





Current Medications


Acetaminophen (Tylenol) 650 mg PRN Q6HRS  PRN PO PAIN / TEMP Last administered 

on 6/8/18at 03:56;  Start 5/26/18 at 21:15


Multi-Ingredient Ointment (Analgesic Balm) 1 bob PRN QID  PRN TP MUSCLE PAIN 

Last administered on 6/7/18at 21:24;  Start 5/26/18 at 21:15


Al Hydroxide/Mg Hydroxide (Mylanta Plus Xs) 15 ml PRN AFTMEALHC  PRN PO 

DYSPEPSIA Last administered on 5/27/18at 03:43;  Start 5/26/18 at 21:15


Magnesium Hydroxide (Milk Of Magnesia) 2,400 mg PRN QHS  PRN PO CONSTIPATION;  

Start 5/26/18 at 21:15


Mirtazapine (Remeron) 7.5 mg QHS PO  Last administered on 6/1/18at 19:52;  

Start 5/26/18 at 22:00;  Stop 6/2/18 at 18:13;  Status DC


Olanzapine (ZyPREXA ZYDIS) 5 mg PRN Q2HR  PRN PO PSYCHOSIS Last administered on 

6/2/18at 10:11;  Start 5/26/18 at 21:15


Baclofen (Lioresal) 10 mg BID PO  Last administered on 6/8/18at 21:18;  Start 5/ 26/18 at 22:00


Lithium Carbonate 150 mg DAILY PO  Last administered on 6/8/18at 07:48;  Start 5 /27/18 at 09:00


Lithium Carbonate 300 mg QHS PO  Last administered on 6/8/18at 21:19;  Start 5/ 26/18 at 22:00


Lorazepam (Ativan) 0.5 mg PRN TID  PRN PO ANXIETY / AGITATION Last administered 

on 6/1/18at 10:54;  Start 5/26/18 at 21:30


Risperidone (RisperDAL) 1.5 mg QHS PO  Last administered on 6/8/18at 21:18;  

Start 5/26/18 at 22:00


Trazodone HCl (Desyrel) 50 mg PRN QHS  PRN PO INSOMNIA, MAY REPEAT X1 Last 

administered on 6/8/18at 01:11;  Start 5/26/18 at 21:30


Vitamin D (Vitamin D3) 5,000 unit DAILY PO  Last administered on 5/29/18at 07:54

;  Start 5/27/18 at 09:00;  Stop 5/29/18 at 15:34;  Status DC


Multivitamins/ Calcium (Thera-M Plus) 1 tab DAILY PO  Last administered on 6/8/ 18at 07:48;  Start 5/27/18 at 09:00


Neomycin/ Polymyxin/ Bacitracin (Triple Antibiotic Ointment) 1 pkt PRN DAILY  

PRN TP Wound Healing;  Start 5/26/18 at 21:15


Simethicone (Gas-X) 80 mg PRN AFTMEALHC  PRN PO GAS / BLOATING;  Start 5/26/18 

at 21:15


Ascorbic Acid (Vitamin C) 500 mg DAILY PO  Last administered on 6/8/18at 07:48;

  Start 5/27/18 at 09:00


Bisacodyl (Dulcolax Supp) 10 mg PRN DAILY  PRN RI CONSTIPATION;  Start 5/26/18 

at 21:30


Cetirizine HCl (ZyrTEC) 10 mg DAILY PO  Last administered on 6/8/18at 07:48;  

Start 5/27/18 at 09:00


Docusate Sodium (Colace) 100 mg PRN BID  PRN PO CONSTIPATION Last administered 

on 6/8/18at 21:18;  Start 5/26/18 at 21:30


Famotidine (Pepcid) 20 mg BIDBFRMEAL PO  Last administered on 5/27/18at 07:49;  

Start 5/27/18 at 07:30;  Stop 5/27/18 at 07:53;  Status DC


Finasteride (Proscar) 5 mg DAILY PO  Last administered on 6/8/18at 07:48;  

Start 5/27/18 at 09:00


Lactobacillus Rhamnosus (Culturelle) 1 cap BID PO  Last administered on 6/8/ 18at 21:18;  Start 5/26/18 at 22:00


Oxycodone HCl (Roxicodone) 5 mg PRN Q6HRS  PRN PO PAIN Last administered on 6/7/ 18at 22:29;  Start 5/26/18 at 21:30


Polyethylene Glycol (miraLAX) 17 gm BID PO  Last administered on 6/8/18at 21:19

;  Start 5/26/18 at 22:00


Sennosides (Senna) 8.6 mg BID PO  Last administered on 6/8/18at 21:19;  Start 5/ 26/18 at 22:00


Terazosin HCl (Hytrin) 10 mg QHS PO  Last administered on 6/8/18at 21:19;  

Start 5/26/18 at 22:00


Ondansetron HCl (Zofran Odt) 4 mg PRN Q8HRS  PRN PO NAUSEA/VOMITING;  Start 5/26 /18 at 21:30


Famotidine (Pepcid) 20 mg BID PO  Last administered on 6/8/18at 21:17;  Start 5/ 27/18 at 21:00


Levothyroxine Sodium (Synthroid) 50 mcg DAILY06 PO  Last administered on 6/8/ 18at 07:21;  Start 5/28/18 at 06:00


Vitamin D (Vitamin D3) 50,000 unit WEEKLY PO  Last administered on 6/6/18at 09:

00;  Start 5/30/18 at 09:00


Mirtazapine (Remeron) 15 mg QHS PO  Last administered on 6/8/18at 21:23;  Start 

6/2/18 at 21:00


Gabapentin (Neurontin) 100 mg TID PO  Last administered on 6/8/18at 21:19;  

Start 6/8/18 at 21:00





Active Scripts


Active


Reported


Zofran Odt (Ondansetron) 4 Mg Tab.rapdis 4 Mg PO PRN Q6HRS PRN


Simethicone 80 Mg Tab.chew 80 Mg PO PRN AFTMEALHC PRN


Thera M Plus Tablet (Multivits,Ca,Minerals/Iron/FA) 1 Each Tablet 1 Tab PO DAILY


Mirtazapine 7.5 Mg Tablet 7.5 Mg PO QHS


Milk Of Magnesia (Magnesium Hydroxide) 400 Mg/5 Ml Oral.susp 2,400 Mg PO PRN 

DAILY PRN


Culturelle (Lactobacillus Rhamnosus Gg) 1 Each Capsule 1 Each PO BID


Vitamin D3 (Cholecalciferol (Vitamin D3)) 1,000 Unit Tablet 5,000 Unit PO DAILY


Zyrtec (Cetirizine Hcl) 10 Mg Tablet 10 Mg PO DAILY


Vitamin C (Ascorbate Calcium) 500 Mg Tablet 500 Mg PO DAILY


Trazodone Hcl 50 Mg Tablet 50 Mg PO PRN QHS PRN


Risperdal (Risperidone) 1 Mg Tablet 1.5 Mg PO QHS


Zyprexa Zydis (Olanzapine) 5 Mg Tab.rapdis 5 Mg PO PRN Q2HR PRN


Lithium Carbonate 150 Mg Capsule 150 Mg PO DAILY


Miralax (Polyethylene Glycol 3350) 17 Gm Powd.pack 17 Gm PO BID


Triple Antibiotic Ointment (Neomy Sulf/Bacitra/Polymyxin B) 1 Each Packet 1 

Packet TP PRN DAILY PRN


Oxycodone Hcl 5 Mg Capsule 5 Mg PO PRN Q6HRS PRN


Lorazepam 0.5 Mg Tablet 0.5 Mg PO TID PRN PRN


Famotidine 20 Mg Tablet 20 Mg PO BIDBFRMEAL


Terazosin Hcl 10 Mg Capsule 10 Mg PO DAILY


Lithium Carbonate 300 Mg Capsule 300 Mg PO HS


Baclofen 10 Mg Tablet 10 Mg PO BID


Maalox Maximum Strength Susp (Mag Hydrox/Al Hydrox/Simeth) 355 Ml Oral.susp 30 

Ml PO PRN Q2HR PRN


Finasteride 5 Mg Tablet 5 Mg PO DAILY


Colace (Docusate Sodium) 100 Mg Capsule 100 Mg PO PRN BID PRN


Senokot (Sennosides) 8.6 Mg Tablet 8.6 Mg PO BID


Bisacodyl 10 Mg Supp.rect 10 Mg RC PRN DAILY PRN


Tylenol (Acetaminophen) 325 Mg Tablet 650 Mg PO PRN Q6HRS PRN


I have reviewed the current psychotropics carefully including drug 

interactions.  Risk benefit ratio favors no change other than as noted in my 

dictated progress note.





Diagnosis:


Problems:  


(1) Anxiety disorder


(2) Bipolar affective, mixed, sev w/ psych


(3) Dementia, vascular, with delusions


(4) Mild cognitive impairment


(5) Osteomyelitis of hand, left, acute


(6) Quadriplegia











DIANA STALLINGS MD Jun 8, 2018 23:10

## 2018-06-08 NOTE — NUR
AYESHA coordinate placement w/pt, pt's family and [facility] today.  Pt will be discharging on Monday 
as [facility] will need to order a Broda for pt.  [employer] was approved for pt for payor 
source.

## 2018-06-08 NOTE — NUR
Behavior Intervention Response and Plan:

BIRP Note:

Behavior: Assumed Care of patient, patient located in Day Room at shift change.  Patient 
exhibited the following behavior Interactive, Calm, Compliant.  Brief assessment on rounds 
of vital signs, medication needs, lab studies, and pain.  Treatment plan problems .



Intervention: Patient assessed and the following interventions initiated safety checks 15 
Minute Checks Cognitive Assessment , Head to toe Assessment , Medications.



Response: After interactions and interventions patient responded in the following manner, 
Interactive , Calm ,Compliant.  Continue to assess behaviors and condition will continue to 
monitor throughout the shift as needed. Patient educated on ADL's, and hand hygiene.

## 2018-06-09 VITALS — SYSTOLIC BLOOD PRESSURE: 93 MMHG | DIASTOLIC BLOOD PRESSURE: 57 MMHG

## 2018-06-09 VITALS — DIASTOLIC BLOOD PRESSURE: 56 MMHG | SYSTOLIC BLOOD PRESSURE: 92 MMHG

## 2018-06-09 RX ADMIN — TERAZOSIN HYDROCHLORIDE SCH MG: 5 CAPSULE ORAL at 21:29

## 2018-06-09 RX ADMIN — LEVOTHYROXINE SODIUM SCH MCG: 50 TABLET ORAL at 06:12

## 2018-06-09 RX ADMIN — TRAZODONE HYDROCHLORIDE PRN MG: 50 TABLET, FILM COATED ORAL at 20:53

## 2018-06-09 RX ADMIN — GABAPENTIN SCH MG: 100 CAPSULE ORAL at 14:23

## 2018-06-09 RX ADMIN — LITHIUM CARBONATE SCH MG: 150 CAPSULE, GELATIN COATED ORAL at 21:02

## 2018-06-09 RX ADMIN — MIRTAZAPINE SCH MG: 15 TABLET, FILM COATED ORAL at 20:53

## 2018-06-09 RX ADMIN — BACLOFEN SCH MG: 10 TABLET ORAL at 20:52

## 2018-06-09 RX ADMIN — Medication SCH CAP: at 20:53

## 2018-06-09 RX ADMIN — GABAPENTIN SCH MG: 100 CAPSULE ORAL at 20:53

## 2018-06-09 RX ADMIN — RISPERIDONE SCH MG: 0.5 TABLET ORAL at 20:53

## 2018-06-09 RX ADMIN — SENNOSIDES A AND B SCH MG: 8.6 TABLET, FILM COATED ORAL at 08:20

## 2018-06-09 RX ADMIN — POLYETHYLENE GLYCOL 3350 SCH GM: 17 POWDER, FOR SOLUTION ORAL at 20:58

## 2018-06-09 RX ADMIN — BACLOFEN SCH MG: 10 TABLET ORAL at 08:20

## 2018-06-09 RX ADMIN — ACETAMINOPHEN PRN MG: 325 TABLET, FILM COATED ORAL at 01:28

## 2018-06-09 RX ADMIN — FINASTERIDE SCH MG: 5 TABLET, FILM COATED ORAL at 08:20

## 2018-06-09 RX ADMIN — OXYCODONE HYDROCHLORIDE AND ACETAMINOPHEN SCH MG: 500 TABLET ORAL at 08:19

## 2018-06-09 RX ADMIN — LITHIUM CARBONATE SCH MG: 150 CAPSULE, GELATIN COATED ORAL at 08:20

## 2018-06-09 RX ADMIN — Medication SCH CAP: at 08:20

## 2018-06-09 RX ADMIN — SENNOSIDES A AND B SCH MG: 8.6 TABLET, FILM COATED ORAL at 20:56

## 2018-06-09 RX ADMIN — POLYETHYLENE GLYCOL 3350 SCH GM: 17 POWDER, FOR SOLUTION ORAL at 08:19

## 2018-06-09 RX ADMIN — FAMOTIDINE SCH MG: 20 TABLET ORAL at 08:20

## 2018-06-09 RX ADMIN — FAMOTIDINE SCH MG: 20 TABLET ORAL at 20:56

## 2018-06-09 RX ADMIN — MULTIPLE VITAMINS W/ MINERALS TAB SCH TAB: TAB at 08:19

## 2018-06-09 RX ADMIN — CETIRIZINE HYDROCHLORIDE SCH MG: 10 TABLET, FILM COATED ORAL at 08:20

## 2018-06-09 RX ADMIN — GABAPENTIN SCH MG: 100 CAPSULE ORAL at 08:19

## 2018-06-09 NOTE — PN
DATE:  06/08/2018



PSYCHIATRIC PROGRESS NOTE





This late entry 06/08/2018 covers elements not covered in my initial note.





SUBJECTIVE:  Met with the patient in the evening.  I met with him in his room at

length.  Per nursing report, he has been fairly appropriate, less hyperverbal

and anxious.





REVIEW OF SYSTEMS:  Ambulation is impaired, in bed.  No CV, , pulmonary, eye,

ENT system symptoms on review.





MENTAL STATUS EXAM:  Oriented to himself, situation.  Speech is coherent, less

pressured.  Abstraction fair, computation is impaired, language function intact,

attention span short.  Mood and affect remains improved, less labile.





LABORATORY DATA:  Reviewed.





IMPRESSION:  Unchanged from initial note.





PLAN:  Continue current psychotropics.  Lithium level is 0.8





______________________________

MAN COBY STALLINGS MD



DR:  BRIDGETT/timbo  JOB#:  [id number] / [id number]

DD:  06/09/2018 07:31  DT:  06/09/2018 12:53

## 2018-06-09 NOTE — NUR
Behavior Intervention Response and Plan:

BIRP Note:

Behavior: Assumed Care of patient, patient located in Patient Room at shift change.  Patient 
exhibited the following behavior Interactive, Social, Cooperative.  Brief assessment on 
rounds of vital signs, medication needs, lab studies, and pain.  Treatment plan problems .



Intervention: Patient assessed and the following interventions initiated safety checks 15 
Minute Checks Cognitive Assessment , Head to toe Assessment , Medications.



Response: After interactions and interventions patient responded in the following manner, 
Drowsy , Interactive ,Appropriate.  Continue to assess behaviors and condition will continue 
to monitor throughout the shift as needed. Patient educated on ADL's, and hand hygiene.



Plan: Continue to monitor Master Treatment Plan for patient's progress toward short term 
goals of No harm To self/ others, Decreased Anxiety, long term goals to return to previous 
living setting vs placement.   Continue to assess patient for changes in above assessment.  
Monitor for medication needs, pain, and safety concerns.  Hourly rounding performed to 
ensure safe environment.

## 2018-06-10 VITALS — DIASTOLIC BLOOD PRESSURE: 53 MMHG | SYSTOLIC BLOOD PRESSURE: 89 MMHG

## 2018-06-10 VITALS — DIASTOLIC BLOOD PRESSURE: 60 MMHG | SYSTOLIC BLOOD PRESSURE: 110 MMHG

## 2018-06-10 LAB
ALBUMIN SERPL-MCNC: 2.5 G/DL (ref 3.4–5)
ALBUMIN/GLOB SERPL: 0.6 {RATIO} (ref 1–1.7)
ALP SERPL-CCNC: 88 U/L (ref 46–116)
ALT SERPL-CCNC: 31 U/L (ref 16–63)
ANION GAP SERPL CALC-SCNC: 6 MMOL/L (ref 6–14)
AST SERPL-CCNC: 20 U/L (ref 15–37)
BASOPHILS # BLD AUTO: 0.1 X10^3/UL (ref 0–0.2)
BASOPHILS NFR BLD: 1 % (ref 0–3)
BILIRUB SERPL-MCNC: 0.3 MG/DL (ref 0.2–1)
BUN/CREAT SERPL: 29 (ref 6–20)
CA-I SERPL ISE-MCNC: 35 MG/DL (ref 8–26)
CALCIUM SERPL-MCNC: 8.6 MG/DL (ref 8.5–10.1)
CHLORIDE SERPL-SCNC: 105 MMOL/L (ref 98–107)
CO2 SERPL-SCNC: 27 MMOL/L (ref 21–32)
CREAT SERPL-MCNC: 1.2 MG/DL (ref 0.7–1.3)
EOSINOPHIL NFR BLD: 0.1 X10^3/UL (ref 0–0.7)
EOSINOPHIL NFR BLD: 1 % (ref 0–3)
ERYTHROCYTE [DISTWIDTH] IN BLOOD BY AUTOMATED COUNT: 16.2 % (ref 11.5–14.5)
GFR SERPLBLD BASED ON 1.73 SQ M-ARVRAT: 59.3 ML/MIN
GLOBULIN SER-MCNC: 4.5 G/DL (ref 2.2–3.8)
GLUCOSE SERPL-MCNC: 142 MG/DL (ref 70–99)
HCT VFR BLD CALC: 36.6 % (ref 39–53)
HGB BLD-MCNC: 12 G/DL (ref 13–17.5)
LYMPHOCYTES # BLD: 3.4 X10^3/UL (ref 1–4.8)
LYMPHOCYTES NFR BLD AUTO: 37 % (ref 24–48)
MCH RBC QN AUTO: 30 PG (ref 25–35)
MCHC RBC AUTO-ENTMCNC: 33 G/DL (ref 31–37)
MCV RBC AUTO: 91 FL (ref 79–100)
MONO #: 0.9 X10^3/UL (ref 0–1.1)
MONOCYTES NFR BLD: 10 % (ref 0–9)
NEUT #: 4.8 X10^3UL (ref 1.8–7.7)
NEUTROPHILS NFR BLD AUTO: 52 % (ref 31–73)
PLATELET # BLD AUTO: 224 X10^3/UL (ref 140–400)
POTASSIUM SERPL-SCNC: 4.1 MMOL/L (ref 3.5–5.1)
PROT SERPL-MCNC: 7 G/DL (ref 6.4–8.2)
RBC # BLD AUTO: 4.01 X10^6/UL (ref 4.3–5.7)
SODIUM SERPL-SCNC: 138 MMOL/L (ref 136–145)
WBC # BLD AUTO: 9.3 X10^3/UL (ref 4–11)

## 2018-06-10 RX ADMIN — GABAPENTIN SCH MG: 100 CAPSULE ORAL at 20:54

## 2018-06-10 RX ADMIN — LEVOTHYROXINE SODIUM SCH MCG: 50 TABLET ORAL at 05:49

## 2018-06-10 RX ADMIN — BACLOFEN SCH MG: 10 TABLET ORAL at 20:53

## 2018-06-10 RX ADMIN — FAMOTIDINE SCH MG: 20 TABLET ORAL at 08:45

## 2018-06-10 RX ADMIN — GABAPENTIN SCH MG: 100 CAPSULE ORAL at 08:45

## 2018-06-10 RX ADMIN — BACLOFEN SCH MG: 10 TABLET ORAL at 08:45

## 2018-06-10 RX ADMIN — MULTIPLE VITAMINS W/ MINERALS TAB SCH TAB: TAB at 08:45

## 2018-06-10 RX ADMIN — FINASTERIDE SCH MG: 5 TABLET, FILM COATED ORAL at 08:45

## 2018-06-10 RX ADMIN — LITHIUM CARBONATE SCH MG: 150 CAPSULE, GELATIN COATED ORAL at 09:00

## 2018-06-10 RX ADMIN — RISPERIDONE SCH MG: 0.5 TABLET ORAL at 20:54

## 2018-06-10 RX ADMIN — TERAZOSIN HYDROCHLORIDE SCH MG: 5 CAPSULE ORAL at 20:53

## 2018-06-10 RX ADMIN — CETIRIZINE HYDROCHLORIDE SCH MG: 10 TABLET, FILM COATED ORAL at 08:45

## 2018-06-10 RX ADMIN — FAMOTIDINE SCH MG: 20 TABLET ORAL at 20:53

## 2018-06-10 RX ADMIN — POLYETHYLENE GLYCOL 3350 SCH GM: 17 POWDER, FOR SOLUTION ORAL at 08:45

## 2018-06-10 RX ADMIN — GABAPENTIN SCH MG: 100 CAPSULE ORAL at 14:32

## 2018-06-10 RX ADMIN — SENNOSIDES A AND B SCH MG: 8.6 TABLET, FILM COATED ORAL at 20:53

## 2018-06-10 RX ADMIN — LITHIUM CARBONATE SCH MG: 150 CAPSULE, GELATIN COATED ORAL at 21:00

## 2018-06-10 RX ADMIN — Medication SCH CAP: at 08:45

## 2018-06-10 RX ADMIN — POLYETHYLENE GLYCOL 3350 SCH GM: 17 POWDER, FOR SOLUTION ORAL at 20:54

## 2018-06-10 RX ADMIN — Medication SCH CAP: at 20:53

## 2018-06-10 RX ADMIN — OXYCODONE HYDROCHLORIDE AND ACETAMINOPHEN SCH MG: 500 TABLET ORAL at 08:45

## 2018-06-10 RX ADMIN — SENNOSIDES A AND B SCH MG: 8.6 TABLET, FILM COATED ORAL at 08:45

## 2018-06-10 RX ADMIN — MIRTAZAPINE SCH MG: 15 TABLET, FILM COATED ORAL at 20:54

## 2018-06-10 NOTE — NUR
Behavior Intervention Response and Plan:

BIRP Note:

Behavior: Assumed Care of patient, patient located in Day Room at shift change.  Patient 
exhibited the following behavior Interactive, Calm, Social.  Brief assessment on rounds of 
vital signs, medication needs, lab studies, and pain.  Treatment plan problems .



Intervention: Patient assessed and the following interventions initiated safety checks 15 
Minute Checks Cognitive Assessment , Head to toe Assessment , Medications.



Response: After interactions and interventions patient responded in the following manner, 
Cooperative , Calm ,Compliant.  Continue to assess behaviors and condition will continue to 
monitor throughout the shift as needed. Patient educated on ADL's, and hand hygiene.



Plan: Continue to monitor Master Treatment Plan for patient's progress toward short term 
goals of Medication Compliance, Decreased Anxiety, long term goals to return to previous 
living setting vs placement.   Continue to assess patient for changes in above assessment.  
Monitor for medication needs, pain, and safety concerns.  Hourly rounding performed to 
ensure safe environment.

## 2018-06-10 NOTE — PDOC
Exam


Note:


Shai Note:


Late entry for date of service June 9, 2018. Please also refer to the separate 

dictated note~for this date of service dictated separately.~Patient seen 

individually. Discussed the patient with Nursing staff reviewed the chart.~

Reviewed interim history and current functioning. Reviewed vital signs,~Labs/ 

Radiology~and current medications noted below. Continue current treatment with 

the changes noted in the dictated addendum note





Assessment:


Vital Signs:





 VS - Last 72 Hours, by Label








  Date Time  Temp Pulse Resp B/P (MAP) Pulse Ox O2 Delivery O2 Flow Rate FiO2


 


6/10/18 16:29 97.1 61 18 110/60 (77) 97   


 


6/10/18 05:35 98.2 53 18 89/53 (65) 94   


 


6/9/18 21:29    128/76    


 


6/9/18 16:13 97.5 60 18 93/57 (69) 96   


 


6/9/18 05:38 97.9 61 16 92/56 (68) 95   


 


6/8/18 21:19  62  125/69    


 


6/8/18 15:52 98.5 62 18 125/69 (87) 98   


 


6/8/18 05:44 96.7 59 18 116/64 (81) 96   


 


6/7/18 23:29   18   Room Air  


 


6/7/18 22:29   18   Room Air  








 Vital Signs








  Date Time  Temp Pulse Resp B/P (MAP) Pulse Ox O2 Delivery O2 Flow Rate FiO2


 


6/10/18 16:29 97.1 61 18 110/60 (77) 97   


 


6/7/18 23:29      Room Air  








I&O











Intake and Output 


 


 6/10/18





 07:00


 


Intake Total 1440 ml


 


Output Total 2390 ml


 


Balance -950 ml


 


 


 


Intake Oral 1440 ml


 


Output Urine Total 2390 ml


 


# Bowel Movements 2








Labs:





 Laboratory Tests








Test


 6/10/18


13:26


 


White Blood Count


 9.3 x10^3/uL


(4.0-11.0)


 


Red Blood Count


 4.01 x10^6/uL


(4.30-5.70)  L


 


Hemoglobin


 12.0 g/dL


(13.0-17.5)  L


 


Hematocrit


 36.6 %


(39.0-53.0)  L


 


Mean Corpuscular Volume


 91 fL ()





 


Mean Corpuscular Hemoglobin 30 pg (25-35)  


 


Mean Corpuscular Hemoglobin


Concent 33 g/dL


(31-37)


 


Red Cell Distribution Width


 16.2 %


(11.5-14.5)  H


 


Platelet Count


 224 x10^3/uL


(140-400)


 


Neutrophils (%) (Auto) 52 % (31-73)  


 


Lymphocytes (%) (Auto) 37 % (24-48)  


 


Monocytes (%) (Auto) 10 % (0-9)  H


 


Eosinophils (%) (Auto) 1 % (0-3)  


 


Basophils (%) (Auto) 1 % (0-3)  


 


Neutrophils # (Auto)


 4.8 x10^3uL


(1.8-7.7)


 


Lymphocytes # (Auto)


 3.4 x10^3/uL


(1.0-4.8)


 


Monocytes # (Auto)


 0.9 x10^3/uL


(0.0-1.1)


 


Eosinophils # (Auto)


 0.1 x10^3/uL


(0.0-0.7)


 


Basophils # (Auto)


 0.1 x10^3/uL


(0.0-0.2)


 


Sodium Level


 138 mmol/L


(136-145)


 


Potassium Level


 4.1 mmol/L


(3.5-5.1)


 


Chloride Level


 105 mmol/L


()


 


Carbon Dioxide Level


 27 mmol/L


(21-32)


 


Anion Gap 6 (6-14)  


 


Blood Urea Nitrogen


 35 mg/dL


(8-26)  H


 


Creatinine


 1.2 mg/dL


(0.7-1.3)


 


Estimated GFR


(Cockcroft-Gault) 59.3  





 


BUN/Creatinine Ratio 29 (6-20)  H


 


Glucose Level


 142 mg/dL


(70-99)  H


 


Calcium Level


 8.6 mg/dL


(8.5-10.1)


 


Total Bilirubin


 0.3 mg/dL


(0.2-1.0)


 


Aspartate Amino Transferase


(AST) 20 U/L (15-37)





 


Alanine Aminotransferase (ALT)


 31 U/L (16-63)





 


Alkaline Phosphatase


 88 U/L


()


 


Total Protein


 7.0 g/dL


(6.4-8.2)


 


Albumin


 2.5 g/dL


(3.4-5.0)  L


 


Albumin/Globulin Ratio


 0.6 (1.0-1.7)


L











Current Medications:


Meds:





Current Medications


Acetaminophen (Tylenol) 650 mg PRN Q6HRS  PRN PO PAIN / TEMP Last administered 

on 6/9/18at 01:28;  Start 5/26/18 at 21:15


Multi-Ingredient Ointment (Analgesic Balm) 1 bob PRN QID  PRN TP MUSCLE PAIN 

Last administered on 6/7/18at 21:24;  Start 5/26/18 at 21:15


Al Hydroxide/Mg Hydroxide (Mylanta Plus Xs) 15 ml PRN AFTMEALHC  PRN PO 

DYSPEPSIA Last administered on 5/27/18at 03:43;  Start 5/26/18 at 21:15


Magnesium Hydroxide (Milk Of Magnesia) 2,400 mg PRN QHS  PRN PO CONSTIPATION;  

Start 5/26/18 at 21:15


Mirtazapine (Remeron) 7.5 mg QHS PO  Last administered on 6/1/18at 19:52;  

Start 5/26/18 at 22:00;  Stop 6/2/18 at 18:13;  Status DC


Olanzapine (ZyPREXA ZYDIS) 5 mg PRN Q2HR  PRN PO PSYCHOSIS Last administered on 

6/2/18at 10:11;  Start 5/26/18 at 21:15


Baclofen (Lioresal) 10 mg BID PO  Last administered on 6/10/18at 08:45;  Start 5 /26/18 at 22:00


Lithium Carbonate 150 mg DAILY PO  Last administered on 6/10/18at 09:00;  Start 

5/27/18 at 09:00


Lithium Carbonate 300 mg QHS PO  Last administered on 6/9/18at 21:02;  Start 5/ 26/18 at 22:00


Lorazepam (Ativan) 0.5 mg PRN TID  PRN PO ANXIETY / AGITATION Last administered 

on 6/1/18at 10:54;  Start 5/26/18 at 21:30


Risperidone (RisperDAL) 1.5 mg QHS PO  Last administered on 6/9/18at 20:53;  

Start 5/26/18 at 22:00


Trazodone HCl (Desyrel) 50 mg PRN QHS  PRN PO INSOMNIA, MAY REPEAT X1 Last 

administered on 6/9/18at 20:53;  Start 5/26/18 at 21:30


Vitamin D (Vitamin D3) 5,000 unit DAILY PO  Last administered on 5/29/18at 07:54

;  Start 5/27/18 at 09:00;  Stop 5/29/18 at 15:34;  Status DC


Multivitamins/ Calcium (Thera-M Plus) 1 tab DAILY PO  Last administered on 6/10/

18at 08:45;  Start 5/27/18 at 09:00


Neomycin/ Polymyxin/ Bacitracin (Triple Antibiotic Ointment) 1 pkt PRN DAILY  

PRN TP Wound Healing;  Start 5/26/18 at 21:15


Simethicone (Gas-X) 80 mg PRN AFTMEALHC  PRN PO GAS / BLOATING;  Start 5/26/18 

at 21:15


Ascorbic Acid (Vitamin C) 500 mg DAILY PO  Last administered on 6/10/18at 08:45

;  Start 5/27/18 at 09:00


Bisacodyl (Dulcolax Supp) 10 mg PRN DAILY  PRN PA CONSTIPATION;  Start 5/26/18 

at 21:30


Cetirizine HCl (ZyrTEC) 10 mg DAILY PO  Last administered on 6/10/18at 08:45;  

Start 5/27/18 at 09:00


Docusate Sodium (Colace) 100 mg PRN BID  PRN PO CONSTIPATION Last administered 

on 6/8/18at 21:18;  Start 5/26/18 at 21:30


Famotidine (Pepcid) 20 mg BIDBFRMEAL PO  Last administered on 5/27/18at 07:49;  

Start 5/27/18 at 07:30;  Stop 5/27/18 at 07:53;  Status DC


Finasteride (Proscar) 5 mg DAILY PO  Last administered on 6/10/18at 08:45;  

Start 5/27/18 at 09:00


Lactobacillus Rhamnosus (Culturelle) 1 cap BID PO  Last administered on 6/10/

18at 08:45;  Start 5/26/18 at 22:00


Oxycodone HCl (Roxicodone) 5 mg PRN Q6HRS  PRN PO PAIN Last administered on 6/10

/18at 14:31;  Start 5/26/18 at 21:30


Polyethylene Glycol (miraLAX) 17 gm BID PO  Last administered on 6/10/18at 08:45

;  Start 5/26/18 at 22:00


Sennosides (Senna) 8.6 mg BID PO  Last administered on 6/10/18at 08:45;  Start 5 /26/18 at 22:00


Terazosin HCl (Hytrin) 10 mg QHS PO  Last administered on 6/9/18at 21:29;  

Start 5/26/18 at 22:00


Ondansetron HCl (Zofran Odt) 4 mg PRN Q8HRS  PRN PO NAUSEA/VOMITING;  Start 5/26 /18 at 21:30


Famotidine (Pepcid) 20 mg BID PO  Last administered on 6/10/18at 08:45;  Start 5 /27/18 at 21:00


Levothyroxine Sodium (Synthroid) 50 mcg DAILY06 PO  Last administered on 6/10/

18at 05:49;  Start 5/28/18 at 06:00


Vitamin D (Vitamin D3) 50,000 unit WEEKLY PO  Last administered on 6/6/18at 09:

00;  Start 5/30/18 at 09:00


Mirtazapine (Remeron) 15 mg QHS PO  Last administered on 6/9/18at 20:53;  Start 

6/2/18 at 21:00


Gabapentin (Neurontin) 100 mg TID PO  Last administered on 6/10/18at 14:32;  

Start 6/8/18 at 21:00





Active Scripts


Active


Reported


Zofran Odt (Ondansetron) 4 Mg Tab.rapdis 4 Mg PO PRN Q6HRS PRN


Simethicone 80 Mg Tab.chew 80 Mg PO PRN AFTMEALHC PRN


Thera M Plus Tablet (Multivits,Ca,Minerals/Iron/FA) 1 Each Tablet 1 Tab PO DAILY


Mirtazapine 7.5 Mg Tablet 7.5 Mg PO QHS


Milk Of Magnesia (Magnesium Hydroxide) 400 Mg/5 Ml Oral.susp 2,400 Mg PO PRN 

DAILY PRN


Culturelle (Lactobacillus Rhamnosus Gg) 1 Each Capsule 1 Each PO BID


Vitamin D3 (Cholecalciferol (Vitamin D3)) 1,000 Unit Tablet 5,000 Unit PO DAILY


Zyrtec (Cetirizine Hcl) 10 Mg Tablet 10 Mg PO DAILY


Vitamin C (Ascorbate Calcium) 500 Mg Tablet 500 Mg PO DAILY


Trazodone Hcl 50 Mg Tablet 50 Mg PO PRN QHS PRN


Risperdal (Risperidone) 1 Mg Tablet 1.5 Mg PO QHS


Zyprexa Zydis (Olanzapine) 5 Mg Tab.rapdis 5 Mg PO PRN Q2HR PRN


Lithium Carbonate 150 Mg Capsule 150 Mg PO DAILY


Miralax (Polyethylene Glycol 3350) 17 Gm Powd.pack 17 Gm PO BID


Triple Antibiotic Ointment (Neomy Sulf/Bacitra/Polymyxin B) 1 Each Packet 1 

Packet TP PRN DAILY PRN


Oxycodone Hcl 5 Mg Capsule 5 Mg PO PRN Q6HRS PRN


Lorazepam 0.5 Mg Tablet 0.5 Mg PO TID PRN PRN


Famotidine 20 Mg Tablet 20 Mg PO BIDBFRMEAL


Terazosin Hcl 10 Mg Capsule 10 Mg PO DAILY


Lithium Carbonate 300 Mg Capsule 300 Mg PO HS


Baclofen 10 Mg Tablet 10 Mg PO BID


Maalox Maximum Strength Susp (Mag Hydrox/Al Hydrox/Simeth) 355 Ml Oral.susp 30 

Ml PO PRN Q2HR PRN


Finasteride 5 Mg Tablet 5 Mg PO DAILY


Colace (Docusate Sodium) 100 Mg Capsule 100 Mg PO PRN BID PRN


Senokot (Sennosides) 8.6 Mg Tablet 8.6 Mg PO BID


Bisacodyl 10 Mg Supp.rect 10 Mg RC PRN DAILY PRN


Tylenol (Acetaminophen) 325 Mg Tablet 650 Mg PO PRN Q6HRS PRN


I have reviewed the current psychotropics carefully including drug 

interactions.  Risk benefit ratio favors no change other than as noted in my 

dictated progress note.





Diagnosis:


Problems:  


(1) Anxiety disorder


(2) Bipolar affective, mixed, sev w/ psych


(3) Dementia, vascular, with delusions


(4) Mild cognitive impairment


(5) Osteomyelitis of hand, left, acute


(6) Quadriplegia











DIANA STALLINGS MD [place] 10, 2018 20:11

## 2018-06-10 NOTE — PDOC
Exam


Note:


Shai Note:


Please also refer to the separate dictated note~for this date of service 

dictated separately.~Patient seen individually. Discussed the patient with 

Nursing staff reviewed the chart.~Reviewed interim history and current 

functioning. Reviewed vital signs,~Labs/ Radiology~and current medications 

noted below. Continue current treatment with the changes noted in the dictated 

addendum note





Assessment:


Vital Signs:





 Vital Signs








  Date Time  Temp Pulse Resp B/P (MAP) Pulse Ox O2 Delivery O2 Flow Rate FiO2


 


6/10/18 16:29 97.1 61 18 110/60 (77) 97   


 


6/7/18 23:29      Room Air  








I&O











Intake and Output 


 


 6/10/18





 07:00


 


Intake Total 1440 ml


 


Output Total 2390 ml


 


Balance -950 ml


 


 


 


Intake Oral 1440 ml


 


Output Urine Total 2390 ml


 


# Bowel Movements 2








Labs:





 Laboratory Tests








Test


 6/10/18


13:26


 


White Blood Count


 9.3 x10^3/uL


(4.0-11.0)


 


Red Blood Count


 4.01 x10^6/uL


(4.30-5.70)  L


 


Hemoglobin


 12.0 g/dL


(13.0-17.5)  L


 


Hematocrit


 36.6 %


(39.0-53.0)  L


 


Mean Corpuscular Volume


 91 fL ()





 


Mean Corpuscular Hemoglobin 30 pg (25-35)  


 


Mean Corpuscular Hemoglobin


Concent 33 g/dL


(31-37)


 


Red Cell Distribution Width


 16.2 %


(11.5-14.5)  H


 


Platelet Count


 224 x10^3/uL


(140-400)


 


Neutrophils (%) (Auto) 52 % (31-73)  


 


Lymphocytes (%) (Auto) 37 % (24-48)  


 


Monocytes (%) (Auto) 10 % (0-9)  H


 


Eosinophils (%) (Auto) 1 % (0-3)  


 


Basophils (%) (Auto) 1 % (0-3)  


 


Neutrophils # (Auto)


 4.8 x10^3uL


(1.8-7.7)


 


Lymphocytes # (Auto)


 3.4 x10^3/uL


(1.0-4.8)


 


Monocytes # (Auto)


 0.9 x10^3/uL


(0.0-1.1)


 


Eosinophils # (Auto)


 0.1 x10^3/uL


(0.0-0.7)


 


Basophils # (Auto)


 0.1 x10^3/uL


(0.0-0.2)


 


Sodium Level


 138 mmol/L


(136-145)


 


Potassium Level


 4.1 mmol/L


(3.5-5.1)


 


Chloride Level


 105 mmol/L


()


 


Carbon Dioxide Level


 27 mmol/L


(21-32)


 


Anion Gap 6 (6-14)  


 


Blood Urea Nitrogen


 35 mg/dL


(8-26)  H


 


Creatinine


 1.2 mg/dL


(0.7-1.3)


 


Estimated GFR


(Cockcroft-Gault) 59.3  





 


BUN/Creatinine Ratio 29 (6-20)  H


 


Glucose Level


 142 mg/dL


(70-99)  H


 


Calcium Level


 8.6 mg/dL


(8.5-10.1)


 


Total Bilirubin


 0.3 mg/dL


(0.2-1.0)


 


Aspartate Amino Transferase


(AST) 20 U/L (15-37)





 


Alanine Aminotransferase (ALT)


 31 U/L (16-63)





 


Alkaline Phosphatase


 88 U/L


()


 


Total Protein


 7.0 g/dL


(6.4-8.2)


 


Albumin


 2.5 g/dL


(3.4-5.0)  L


 


Albumin/Globulin Ratio


 0.6 (1.0-1.7)


L











Current Medications:


Meds:





Current Medications


Acetaminophen (Tylenol) 650 mg PRN Q6HRS  PRN PO PAIN / TEMP Last administered 

on 6/9/18at 01:28;  Start 5/26/18 at 21:15


Multi-Ingredient Ointment (Analgesic Balm) 1 bob PRN QID  PRN TP MUSCLE PAIN 

Last administered on 6/7/18at 21:24;  Start 5/26/18 at 21:15


Al Hydroxide/Mg Hydroxide (Mylanta Plus Xs) 15 ml PRN AFTMEALHC  PRN PO 

DYSPEPSIA Last administered on 5/27/18at 03:43;  Start 5/26/18 at 21:15


Magnesium Hydroxide (Milk Of Magnesia) 2,400 mg PRN QHS  PRN PO CONSTIPATION;  

Start 5/26/18 at 21:15


Mirtazapine (Remeron) 7.5 mg QHS PO  Last administered on 6/1/18at 19:52;  

Start 5/26/18 at 22:00;  Stop 6/2/18 at 18:13;  Status DC


Olanzapine (ZyPREXA ZYDIS) 5 mg PRN Q2HR  PRN PO PSYCHOSIS Last administered on 

6/2/18at 10:11;  Start 5/26/18 at 21:15


Baclofen (Lioresal) 10 mg BID PO  Last administered on 6/10/18at 08:45;  Start 5 /26/18 at 22:00


Lithium Carbonate 150 mg DAILY PO  Last administered on 6/10/18at 09:00;  Start 

5/27/18 at 09:00


Lithium Carbonate 300 mg QHS PO  Last administered on 6/9/18at 21:02;  Start 5/ 26/18 at 22:00


Lorazepam (Ativan) 0.5 mg PRN TID  PRN PO ANXIETY / AGITATION Last administered 

on 6/1/18at 10:54;  Start 5/26/18 at 21:30


Risperidone (RisperDAL) 1.5 mg QHS PO  Last administered on 6/9/18at 20:53;  

Start 5/26/18 at 22:00


Trazodone HCl (Desyrel) 50 mg PRN QHS  PRN PO INSOMNIA, MAY REPEAT X1 Last 

administered on 6/9/18at 20:53;  Start 5/26/18 at 21:30


Vitamin D (Vitamin D3) 5,000 unit DAILY PO  Last administered on 5/29/18at 07:54

;  Start 5/27/18 at 09:00;  Stop 5/29/18 at 15:34;  Status DC


Multivitamins/ Calcium (Thera-M Plus) 1 tab DAILY PO  Last administered on 6/10/

18at 08:45;  Start 5/27/18 at 09:00


Neomycin/ Polymyxin/ Bacitracin (Triple Antibiotic Ointment) 1 pkt PRN DAILY  

PRN TP Wound Healing;  Start 5/26/18 at 21:15


Simethicone (Gas-X) 80 mg PRN AFTMEALHC  PRN PO GAS / BLOATING;  Start 5/26/18 

at 21:15


Ascorbic Acid (Vitamin C) 500 mg DAILY PO  Last administered on 6/10/18at 08:45

;  Start 5/27/18 at 09:00


Bisacodyl (Dulcolax Supp) 10 mg PRN DAILY  PRN OR CONSTIPATION;  Start 5/26/18 

at 21:30


Cetirizine HCl (ZyrTEC) 10 mg DAILY PO  Last administered on 6/10/18at 08:45;  

Start 5/27/18 at 09:00


Docusate Sodium (Colace) 100 mg PRN BID  PRN PO CONSTIPATION Last administered 

on 6/8/18at 21:18;  Start 5/26/18 at 21:30


Famotidine (Pepcid) 20 mg BIDBFRMEAL PO  Last administered on 5/27/18at 07:49;  

Start 5/27/18 at 07:30;  Stop 5/27/18 at 07:53;  Status DC


Finasteride (Proscar) 5 mg DAILY PO  Last administered on 6/10/18at 08:45;  

Start 5/27/18 at 09:00


Lactobacillus Rhamnosus (Culturelle) 1 cap BID PO  Last administered on 6/10/

18at 08:45;  Start 5/26/18 at 22:00


Oxycodone HCl (Roxicodone) 5 mg PRN Q6HRS  PRN PO PAIN Last administered on 6/10

/18at 14:31;  Start 5/26/18 at 21:30


Polyethylene Glycol (miraLAX) 17 gm BID PO  Last administered on 6/10/18at 08:45

;  Start 5/26/18 at 22:00


Sennosides (Senna) 8.6 mg BID PO  Last administered on 6/10/18at 08:45;  Start 5 /26/18 at 22:00


Terazosin HCl (Hytrin) 10 mg QHS PO  Last administered on 6/9/18at 21:29;  

Start 5/26/18 at 22:00


Ondansetron HCl (Zofran Odt) 4 mg PRN Q8HRS  PRN PO NAUSEA/VOMITING;  Start 5/26 /18 at 21:30


Famotidine (Pepcid) 20 mg BID PO  Last administered on 6/10/18at 08:45;  Start 5 /27/18 at 21:00


Levothyroxine Sodium (Synthroid) 50 mcg DAILY06 PO  Last administered on 6/10/

18at 05:49;  Start 5/28/18 at 06:00


Vitamin D (Vitamin D3) 50,000 unit WEEKLY PO  Last administered on 6/6/18at 09:

00;  Start 5/30/18 at 09:00


Mirtazapine (Remeron) 15 mg QHS PO  Last administered on 6/9/18at 20:53;  Start 

6/2/18 at 21:00


Gabapentin (Neurontin) 100 mg TID PO  Last administered on 6/10/18at 14:32;  

Start 6/8/18 at 21:00





Active Scripts


Active


Reported


Zofran Odt (Ondansetron) 4 Mg Tab.rapdis 4 Mg PO PRN Q6HRS PRN


Simethicone 80 Mg Tab.chew 80 Mg PO PRN AFTMEALHC PRN


Thera M Plus Tablet (Multivits,Ca,Minerals/Iron/FA) 1 Each Tablet 1 Tab PO DAILY


Mirtazapine 7.5 Mg Tablet 7.5 Mg PO QHS


Milk Of Magnesia (Magnesium Hydroxide) 400 Mg/5 Ml Oral.susp 2,400 Mg PO PRN 

DAILY PRN


Culturelle (Lactobacillus Rhamnosus Gg) 1 Each Capsule 1 Each PO BID


Vitamin D3 (Cholecalciferol (Vitamin D3)) 1,000 Unit Tablet 5,000 Unit PO DAILY


Zyrtec (Cetirizine Hcl) 10 Mg Tablet 10 Mg PO DAILY


Vitamin C (Ascorbate Calcium) 500 Mg Tablet 500 Mg PO DAILY


Trazodone Hcl 50 Mg Tablet 50 Mg PO PRN QHS PRN


Risperdal (Risperidone) 1 Mg Tablet 1.5 Mg PO QHS


Zyprexa Zydis (Olanzapine) 5 Mg Tab.rapdis 5 Mg PO PRN Q2HR PRN


Lithium Carbonate 150 Mg Capsule 150 Mg PO DAILY


Miralax (Polyethylene Glycol 3350) 17 Gm Powd.pack 17 Gm PO BID


Triple Antibiotic Ointment (Neomy Sulf/Bacitra/Polymyxin B) 1 Each Packet 1 

Packet TP PRN DAILY PRN


Oxycodone Hcl 5 Mg Capsule 5 Mg PO PRN Q6HRS PRN


Lorazepam 0.5 Mg Tablet 0.5 Mg PO TID PRN PRN


Famotidine 20 Mg Tablet 20 Mg PO BIDBFRMEAL


Terazosin Hcl 10 Mg Capsule 10 Mg PO DAILY


Lithium Carbonate 300 Mg Capsule 300 Mg PO HS


Baclofen 10 Mg Tablet 10 Mg PO BID


Maalox Maximum Strength Susp (Mag Hydrox/Al Hydrox/Simeth) 355 Ml Oral.susp 30 

Ml PO PRN Q2HR PRN


Finasteride 5 Mg Tablet 5 Mg PO DAILY


Colace (Docusate Sodium) 100 Mg Capsule 100 Mg PO PRN BID PRN


Senokot (Sennosides) 8.6 Mg Tablet 8.6 Mg PO BID


Bisacodyl 10 Mg Supp.rect 10 Mg RC PRN DAILY PRN


Tylenol (Acetaminophen) 325 Mg Tablet 650 Mg PO PRN Q6HRS PRN


I have reviewed the current psychotropics carefully including drug 

interactions.  Risk benefit ratio favors no change other than as noted in my 

dictated progress note.





Diagnosis:


Problems:  


(1) Anxiety disorder


(2) Bipolar affective, mixed, sev w/ psych


(3) Dementia, vascular, with delusions


(4) Mild cognitive impairment


(5) Osteomyelitis of hand, left, acute


(6) Quadriplegia











DIANA STALLINGS MD [place] 10, 2018 20:12

## 2018-06-11 VITALS — DIASTOLIC BLOOD PRESSURE: 61 MMHG | SYSTOLIC BLOOD PRESSURE: 103 MMHG

## 2018-06-11 RX ADMIN — MULTIPLE VITAMINS W/ MINERALS TAB SCH TAB: TAB at 09:13

## 2018-06-11 RX ADMIN — FINASTERIDE SCH MG: 5 TABLET, FILM COATED ORAL at 09:13

## 2018-06-11 RX ADMIN — LITHIUM CARBONATE SCH MG: 150 CAPSULE, GELATIN COATED ORAL at 09:12

## 2018-06-11 RX ADMIN — FAMOTIDINE SCH MG: 20 TABLET ORAL at 09:13

## 2018-06-11 RX ADMIN — POLYETHYLENE GLYCOL 3350 SCH GM: 17 POWDER, FOR SOLUTION ORAL at 09:13

## 2018-06-11 RX ADMIN — OXYCODONE HYDROCHLORIDE AND ACETAMINOPHEN SCH MG: 500 TABLET ORAL at 09:13

## 2018-06-11 RX ADMIN — CETIRIZINE HYDROCHLORIDE SCH MG: 10 TABLET, FILM COATED ORAL at 09:13

## 2018-06-11 RX ADMIN — LEVOTHYROXINE SODIUM SCH MCG: 50 TABLET ORAL at 06:08

## 2018-06-11 RX ADMIN — Medication SCH CAP: at 09:12

## 2018-06-11 RX ADMIN — SENNOSIDES A AND B SCH MG: 8.6 TABLET, FILM COATED ORAL at 09:13

## 2018-06-11 RX ADMIN — GABAPENTIN SCH MG: 100 CAPSULE ORAL at 14:52

## 2018-06-11 RX ADMIN — BACLOFEN SCH MG: 10 TABLET ORAL at 09:13

## 2018-06-11 RX ADMIN — GABAPENTIN SCH MG: 100 CAPSULE ORAL at 09:13

## 2018-06-11 NOTE — NUR
Behavior Intervention Response and Plan:

BIRP Note:

Behavior: Assumed Care of patient, patient located in Day Room at shift change.  Patient 
exhibited the following behavior Interactive, Calm, Social, happy. Brief assessment on 
rounds of vital signs, medication needs, lab studies, and pain.  Treatment plan problems .



Intervention: Patient assessed and the following interventions initiated safety checks 15 
Minute Checks Cognitive Assessment , Head to toe Assessment , Medications.



Response: After interactions and interventions patient responded in the following manner, 
Cooperative, Calm ,Compliant, stated he will miss staff when he leaves. Continue to assess 
behaviors and condition will continue to monitor throughout the shift as needed. Patient 
educated on ADL's, and hand hygiene.



Plan: Continue to monitor Master Treatment Plan for patient's progress toward short term 
goals of Medication Compliance, Decreased Anxiety, long term goals to return to previous 
living setting vs placement.   Continue to assess patient for changes in above assessment.  
Monitor for medication needs, pain, and safety concerns.  Hourly rounding performed to 
ensure safe environment.

## 2018-06-11 NOTE — PDOC
Exam


Note:


Shai Note:


Please also refer to the separate dictated note~for this date of service 

dictated separately.~Patient seen individually. Discussed the patient with 

Nursing staff reviewed the chart.~Reviewed interim history and current 

functioning. Reviewed vital signs,~Labs/ Radiology~and current medications 

noted below. Continue current treatment with the changes noted in the dictated 

addendum note





Assessment:


Vital Signs:





 Vital Signs








  Date Time  Temp Pulse Resp B/P (MAP) Pulse Ox O2 Delivery O2 Flow Rate FiO2


 


6/11/18 11:56   20     


 


6/11/18 06:07 98.4 59  103/61 (75) 97   


 


6/7/18 23:29      Room Air  








I&O











Intake and Output 


 


 6/11/18





 07:00


 


Intake Total 2000 ml


 


Output Total 2425 ml


 


Balance -425 ml


 


 


 


Intake Oral 2000 ml


 


Output Urine Total 2425 ml











Current Medications:


Meds:





Current Medications


Acetaminophen (Tylenol) 650 mg PRN Q6HRS  PRN PO PAIN / TEMP Last administered 

on 6/9/18at 01:28;  Start 5/26/18 at 21:15;  Stop 6/11/18 at 15:21;  Status DC


Multi-Ingredient Ointment (Analgesic Balm) 1 bhavna PRN QID  PRN TP MUSCLE PAIN 

Last administered on 6/7/18at 21:24;  Start 5/26/18 at 21:15;  Stop 6/11/18 at 

15:21;  Status DC


Al Hydroxide/Mg Hydroxide (Mylanta Plus Xs) 15 ml PRN AFTMEALHC  PRN PO 

DYSPEPSIA Last administered on 5/27/18at 03:43;  Start 5/26/18 at 21:15;  Stop 6 /11/18 at 15:21;  Status DC


Magnesium Hydroxide (Milk Of Magnesia) 2,400 mg PRN QHS  PRN PO CONSTIPATION;  

Start 5/26/18 at 21:15;  Stop 6/11/18 at 15:21;  Status DC


Mirtazapine (Remeron) 7.5 mg QHS PO  Last administered on 6/1/18at 19:52;  

Start 5/26/18 at 22:00;  Stop 6/2/18 at 18:13;  Status DC


Olanzapine (ZyPREXA ZYDIS) 5 mg PRN Q2HR  PRN PO PSYCHOSIS Last administered on 

6/2/18at 10:11;  Start 5/26/18 at 21:15;  Stop 6/11/18 at 15:21;  Status DC


Baclofen (Lioresal) 10 mg BID PO  Last administered on 6/11/18at 09:13;  Start 5 /26/18 at 22:00;  Stop 6/11/18 at 15:21;  Status DC


Lithium Carbonate 150 mg DAILY PO  Last administered on 6/11/18at 09:12;  Start 

5/27/18 at 09:00;  Stop 6/11/18 at 15:21;  Status DC


Lithium Carbonate 300 mg QHS PO  Last administered on 6/10/18at 21:00;  Start 5/ 26/18 at 22:00;  Stop 6/11/18 at 15:21;  Status DC


Lorazepam (Ativan) 0.5 mg PRN TID  PRN PO ANXIETY / AGITATION Last administered 

on 6/1/18at 10:54;  Start 5/26/18 at 21:30;  Stop 6/11/18 at 15:21;  Status DC


Risperidone (RisperDAL) 1.5 mg QHS PO  Last administered on 6/10/18at 20:54;  

Start 5/26/18 at 22:00;  Stop 6/11/18 at 15:21;  Status DC


Trazodone HCl (Desyrel) 50 mg PRN QHS  PRN PO INSOMNIA, MAY REPEAT X1 Last 

administered on 6/9/18at 20:53;  Start 5/26/18 at 21:30;  Stop 6/11/18 at 15:21

;  Status DC


Vitamin D (Vitamin D3) 5,000 unit DAILY PO  Last administered on 5/29/18at 07:54

;  Start 5/27/18 at 09:00;  Stop 5/29/18 at 15:34;  Status DC


Multivitamins/ Calcium (Thera-M Plus) 1 tab DAILY PO  Last administered on 6/11/ 18at 09:13;  Start 5/27/18 at 09:00;  Stop 6/11/18 at 15:21;  Status DC


Neomycin/ Polymyxin/ Bacitracin (Triple Antibiotic Ointment) 1 pkt PRN DAILY  

PRN TP Wound Healing;  Start 5/26/18 at 21:15;  Stop 6/11/18 at 15:21;  Status 

DC


Simethicone (Gas-X) 80 mg PRN AFTMEALHC  PRN PO GAS / BLOATING;  Start 5/26/18 

at 21:15;  Stop 6/11/18 at 15:21;  Status DC


Ascorbic Acid (Vitamin C) 500 mg DAILY PO  Last administered on 6/11/18at 09:13

;  Start 5/27/18 at 09:00;  Stop 6/11/18 at 15:21;  Status DC


Bisacodyl (Dulcolax Supp) 10 mg PRN DAILY  PRN NM CONSTIPATION;  Start 5/26/18 

at 21:30;  Stop 6/11/18 at 15:21;  Status DC


Cetirizine HCl (ZyrTEC) 10 mg DAILY PO  Last administered on 6/11/18at 09:13;  

Start 5/27/18 at 09:00;  Stop 6/11/18 at 15:21;  Status DC


Docusate Sodium (Colace) 100 mg PRN BID  PRN PO CONSTIPATION Last administered 

on 6/8/18at 21:18;  Start 5/26/18 at 21:30;  Stop 6/11/18 at 15:21;  Status DC


Famotidine (Pepcid) 20 mg BIDBFRMEAL PO  Last administered on 5/27/18at 07:49;  

Start 5/27/18 at 07:30;  Stop 5/27/18 at 07:53;  Status DC


Finasteride (Proscar) 5 mg DAILY PO  Last administered on 6/11/18at 09:13;  

Start 5/27/18 at 09:00;  Stop 6/11/18 at 15:21;  Status DC


Lactobacillus Rhamnosus (Culturelle) 1 cap BID PO  Last administered on 6/11/ 18at 09:12;  Start 5/26/18 at 22:00;  Stop 6/11/18 at 15:21;  Status DC


Oxycodone HCl (Roxicodone) 5 mg PRN Q6HRS  PRN PO PAIN Last administered on 6/11 /18at 10:56;  Start 5/26/18 at 21:30;  Stop 6/11/18 at 15:21;  Status DC


Polyethylene Glycol (miraLAX) 17 gm BID PO  Last administered on 6/11/18at 09:13

;  Start 5/26/18 at 22:00;  Stop 6/11/18 at 15:21;  Status DC


Sennosides (Senna) 8.6 mg BID PO  Last administered on 6/11/18at 09:13;  Start 5 /26/18 at 22:00;  Stop 6/11/18 at 15:21;  Status DC


Terazosin HCl (Hytrin) 10 mg QHS PO  Last administered on 6/10/18at 20:53;  

Start 5/26/18 at 22:00;  Stop 6/11/18 at 15:21;  Status DC


Ondansetron HCl (Zofran Odt) 4 mg PRN Q8HRS  PRN PO NAUSEA/VOMITING;  Start 5/26 /18 at 21:30;  Stop 6/11/18 at 15:21;  Status DC


Famotidine (Pepcid) 20 mg BID PO  Last administered on 6/11/18at 09:13;  Start 5 /27/18 at 21:00;  Stop 6/11/18 at 15:21;  Status DC


Levothyroxine Sodium (Synthroid) 50 mcg DAILY06 PO  Last administered on 6/11/ 18at 06:08;  Start 5/28/18 at 06:00;  Stop 6/11/18 at 15:21;  Status DC


Vitamin D (Vitamin D3) 50,000 unit WEEKLY PO  Last administered on 6/6/18at 09:

00;  Start 5/30/18 at 09:00;  Stop 6/11/18 at 15:21;  Status DC


Mirtazapine (Remeron) 15 mg QHS PO  Last administered on 6/10/18at 20:54;  

Start 6/2/18 at 21:00;  Stop 6/11/18 at 15:22;  Status DC


Gabapentin (Neurontin) 100 mg TID PO  Last administered on 6/11/18at 14:52;  

Start 6/8/18 at 21:00;  Stop 6/11/18 at 15:22;  Status DC





Active Scripts


Active


Reported


D3-50 (Cholecalciferol (Vitamin D3)) 50,000 Unit Capsule 1 Cap PO WEEKLY


Analgesic Balm (Methyl Salicylate/Menthol) 28 Gm Oint...g. 1 Bhavna TP PRN QID PRN


Levothyroxine Sodium 50 Mcg Tablet 50 Mcg PO DAILY06


Gabapentin 100 Mg Capsule 100 Mg PO TID


Zofran Odt (Ondansetron) 4 Mg Tab.rapdis 4 Mg PO PRN Q8HRS PRN


Simethicone 80 Mg Tab.chew 80 Mg PO PRN AFTMEALHC PRN


Thera M Plus Tablet (Multivits,Ca,Minerals/Iron/FA) 1 Each Tablet 1 Tab PO DAILY


Mirtazapine 7.5 Mg Tablet 15 Mg PO QHS


Milk Of Magnesia (Magnesium Hydroxide) 400 Mg/5 Ml Oral.susp 2,400 Mg PO PRN 

QHS PRN


Culturelle (Lactobacillus Rhamnosus Gg) 1 Each Capsule 1 Each PO BID


Zyrtec (Cetirizine Hcl) 10 Mg Tablet 10 Mg PO DAILY


Vitamin C (Ascorbate Calcium) 500 Mg Tablet 500 Mg PO DAILY


Trazodone Hcl 50 Mg Tablet 50 Mg PO PRN QHS PRN


Risperdal (Risperidone) 1 Mg Tablet 1.5 Mg PO QHS


Zyprexa Zydis (Olanzapine) 5 Mg Tab.rapdis 5 Mg PO PRN Q2HR PRN


Lithium Carbonate 150 Mg Capsule 150 Mg PO DAILY


Miralax (Polyethylene Glycol 3350) 17 Gm Powd.pack 17 Gm PO BID


Triple Antibiotic Ointment (Neomy Sulf/Bacitra/Polymyxin B) 1 Each Packet 1 

Packet TP PRN DAILY PRN


     apply to finger wound.


Oxycodone Hcl 5 Mg Capsule 5 Mg PO PRN Q6HRS PRN


Lorazepam 0.5 Mg Tablet 0.5 Mg PO TID PRN PRN


Famotidine 20 Mg Tablet 20 Mg PO BID


Terazosin Hcl 10 Mg Capsule 10 Mg PO HS


Lithium Carbonate 300 Mg Capsule 300 Mg PO HS


Baclofen 10 Mg Tablet 10 Mg PO BID


Maalox Maximum Strength Susp (Mag Hydrox/Al Hydrox/Simeth) 355 Ml Oral.susp 30 

Ml PO PRN Q2HR PRN


Finasteride 5 Mg Tablet 5 Mg PO DAILY


Colace (Docusate Sodium) 100 Mg Capsule 100 Mg PO PRN BID PRN


Senokot (Sennosides) 8.6 Mg Tablet 8.6 Mg PO BID


Bisacodyl 10 Mg Supp.rect 10 Mg RC PRN DAILY PRN


Tylenol (Acetaminophen) 325 Mg Tablet 650 Mg PO PRN Q6HRS PRN


I have reviewed the current psychotropics carefully including drug 

interactions.  Risk benefit ratio favors no change other than as noted in my 

dictated progress note.





Diagnosis:


Problems:  


(1) Mild cognitive impairment


(2) Dementia, vascular, with delusions


(3) Bipolar affective, mixed, sev w/ psych


(4) Anxiety disorder











DIANA STALLINGS MD Jun 11, 2018 18:21

## 2018-06-11 NOTE — NUR
Sutures removed from 5th digit on left hand. Pt tolerated well. No pain, redness, swelling, 
drainage, or signs of infection noted. Pt asked for an oxycodone bc he was "afraid I might 
have pain later." PRN pain medication given per pt request.

## 2018-06-11 NOTE — PDOC
Exam


Note:


Shai Note:


Please also refer to the separate dictated note~for this date of service 

dictated separately.~Patient seen individually. Discussed the patient with 

Nursing staff reviewed the chart.~Reviewed interim history and current 

functioning. Reviewed vital signs,~Labs/ Radiology~and current medications 

noted below. Continue current treatment with the changes noted in the dictated 

addendum note





Assessment:


Vital Signs:





 Vital Signs








  Date Time  Temp Pulse Resp B/P (MAP) Pulse Ox O2 Delivery O2 Flow Rate FiO2


 


6/11/18 11:56   20     


 


6/11/18 06:07 98.4 59  103/61 (75) 97   


 


6/7/18 23:29      Room Air  








I&O











Intake and Output 


 


 6/11/18





 07:00


 


Intake Total 2000 ml


 


Output Total 2425 ml


 


Balance -425 ml


 


 


 


Intake Oral 2000 ml


 


Output Urine Total 2425 ml











Current Medications:


Meds:





Current Medications


Acetaminophen (Tylenol) 650 mg PRN Q6HRS  PRN PO PAIN / TEMP Last administered 

on 6/9/18at 01:28;  Start 5/26/18 at 21:15;  Stop 6/11/18 at 15:21;  Status DC


Multi-Ingredient Ointment (Analgesic Balm) 1 bhavna PRN QID  PRN TP MUSCLE PAIN 

Last administered on 6/7/18at 21:24;  Start 5/26/18 at 21:15;  Stop 6/11/18 at 

15:21;  Status DC


Al Hydroxide/Mg Hydroxide (Mylanta Plus Xs) 15 ml PRN AFTMEALHC  PRN PO 

DYSPEPSIA Last administered on 5/27/18at 03:43;  Start 5/26/18 at 21:15;  Stop 6 /11/18 at 15:21;  Status DC


Magnesium Hydroxide (Milk Of Magnesia) 2,400 mg PRN QHS  PRN PO CONSTIPATION;  

Start 5/26/18 at 21:15;  Stop 6/11/18 at 15:21;  Status DC


Mirtazapine (Remeron) 7.5 mg QHS PO  Last administered on 6/1/18at 19:52;  

Start 5/26/18 at 22:00;  Stop 6/2/18 at 18:13;  Status DC


Olanzapine (ZyPREXA ZYDIS) 5 mg PRN Q2HR  PRN PO PSYCHOSIS Last administered on 

6/2/18at 10:11;  Start 5/26/18 at 21:15;  Stop 6/11/18 at 15:21;  Status DC


Baclofen (Lioresal) 10 mg BID PO  Last administered on 6/11/18at 09:13;  Start 5 /26/18 at 22:00;  Stop 6/11/18 at 15:21;  Status DC


Lithium Carbonate 150 mg DAILY PO  Last administered on 6/11/18at 09:12;  Start 

5/27/18 at 09:00;  Stop 6/11/18 at 15:21;  Status DC


Lithium Carbonate 300 mg QHS PO  Last administered on 6/10/18at 21:00;  Start 5/ 26/18 at 22:00;  Stop 6/11/18 at 15:21;  Status DC


Lorazepam (Ativan) 0.5 mg PRN TID  PRN PO ANXIETY / AGITATION Last administered 

on 6/1/18at 10:54;  Start 5/26/18 at 21:30;  Stop 6/11/18 at 15:21;  Status DC


Risperidone (RisperDAL) 1.5 mg QHS PO  Last administered on 6/10/18at 20:54;  

Start 5/26/18 at 22:00;  Stop 6/11/18 at 15:21;  Status DC


Trazodone HCl (Desyrel) 50 mg PRN QHS  PRN PO INSOMNIA, MAY REPEAT X1 Last 

administered on 6/9/18at 20:53;  Start 5/26/18 at 21:30;  Stop 6/11/18 at 15:21

;  Status DC


Vitamin D (Vitamin D3) 5,000 unit DAILY PO  Last administered on 5/29/18at 07:54

;  Start 5/27/18 at 09:00;  Stop 5/29/18 at 15:34;  Status DC


Multivitamins/ Calcium (Thera-M Plus) 1 tab DAILY PO  Last administered on 6/11/ 18at 09:13;  Start 5/27/18 at 09:00;  Stop 6/11/18 at 15:21;  Status DC


Neomycin/ Polymyxin/ Bacitracin (Triple Antibiotic Ointment) 1 pkt PRN DAILY  

PRN TP Wound Healing;  Start 5/26/18 at 21:15;  Stop 6/11/18 at 15:21;  Status 

DC


Simethicone (Gas-X) 80 mg PRN AFTMEALHC  PRN PO GAS / BLOATING;  Start 5/26/18 

at 21:15;  Stop 6/11/18 at 15:21;  Status DC


Ascorbic Acid (Vitamin C) 500 mg DAILY PO  Last administered on 6/11/18at 09:13

;  Start 5/27/18 at 09:00;  Stop 6/11/18 at 15:21;  Status DC


Bisacodyl (Dulcolax Supp) 10 mg PRN DAILY  PRN AK CONSTIPATION;  Start 5/26/18 

at 21:30;  Stop 6/11/18 at 15:21;  Status DC


Cetirizine HCl (ZyrTEC) 10 mg DAILY PO  Last administered on 6/11/18at 09:13;  

Start 5/27/18 at 09:00;  Stop 6/11/18 at 15:21;  Status DC


Docusate Sodium (Colace) 100 mg PRN BID  PRN PO CONSTIPATION Last administered 

on 6/8/18at 21:18;  Start 5/26/18 at 21:30;  Stop 6/11/18 at 15:21;  Status DC


Famotidine (Pepcid) 20 mg BIDBFRMEAL PO  Last administered on 5/27/18at 07:49;  

Start 5/27/18 at 07:30;  Stop 5/27/18 at 07:53;  Status DC


Finasteride (Proscar) 5 mg DAILY PO  Last administered on 6/11/18at 09:13;  

Start 5/27/18 at 09:00;  Stop 6/11/18 at 15:21;  Status DC


Lactobacillus Rhamnosus (Culturelle) 1 cap BID PO  Last administered on 6/11/ 18at 09:12;  Start 5/26/18 at 22:00;  Stop 6/11/18 at 15:21;  Status DC


Oxycodone HCl (Roxicodone) 5 mg PRN Q6HRS  PRN PO PAIN Last administered on 6/11 /18at 10:56;  Start 5/26/18 at 21:30;  Stop 6/11/18 at 15:21;  Status DC


Polyethylene Glycol (miraLAX) 17 gm BID PO  Last administered on 6/11/18at 09:13

;  Start 5/26/18 at 22:00;  Stop 6/11/18 at 15:21;  Status DC


Sennosides (Senna) 8.6 mg BID PO  Last administered on 6/11/18at 09:13;  Start 5 /26/18 at 22:00;  Stop 6/11/18 at 15:21;  Status DC


Terazosin HCl (Hytrin) 10 mg QHS PO  Last administered on 6/10/18at 20:53;  

Start 5/26/18 at 22:00;  Stop 6/11/18 at 15:21;  Status DC


Ondansetron HCl (Zofran Odt) 4 mg PRN Q8HRS  PRN PO NAUSEA/VOMITING;  Start 5/26 /18 at 21:30;  Stop 6/11/18 at 15:21;  Status DC


Famotidine (Pepcid) 20 mg BID PO  Last administered on 6/11/18at 09:13;  Start 5 /27/18 at 21:00;  Stop 6/11/18 at 15:21;  Status DC


Levothyroxine Sodium (Synthroid) 50 mcg DAILY06 PO  Last administered on 6/11/ 18at 06:08;  Start 5/28/18 at 06:00;  Stop 6/11/18 at 15:21;  Status DC


Vitamin D (Vitamin D3) 50,000 unit WEEKLY PO  Last administered on 6/6/18at 09:

00;  Start 5/30/18 at 09:00;  Stop 6/11/18 at 15:21;  Status DC


Mirtazapine (Remeron) 15 mg QHS PO  Last administered on 6/10/18at 20:54;  

Start 6/2/18 at 21:00;  Stop 6/11/18 at 15:22;  Status DC


Gabapentin (Neurontin) 100 mg TID PO  Last administered on 6/11/18at 14:52;  

Start 6/8/18 at 21:00;  Stop 6/11/18 at 15:22;  Status DC





Active Scripts


Active


Reported


D3-50 (Cholecalciferol (Vitamin D3)) 50,000 Unit Capsule 1 Cap PO WEEKLY


Analgesic Balm (Methyl Salicylate/Menthol) 28 Gm Oint...g. 1 Bhavna TP PRN QID PRN


Levothyroxine Sodium 50 Mcg Tablet 50 Mcg PO DAILY06


Gabapentin 100 Mg Capsule 100 Mg PO TID


Zofran Odt (Ondansetron) 4 Mg Tab.rapdis 4 Mg PO PRN Q8HRS PRN


Simethicone 80 Mg Tab.chew 80 Mg PO PRN AFTMEALHC PRN


Thera M Plus Tablet (Multivits,Ca,Minerals/Iron/FA) 1 Each Tablet 1 Tab PO DAILY


Mirtazapine 7.5 Mg Tablet 15 Mg PO QHS


Milk Of Magnesia (Magnesium Hydroxide) 400 Mg/5 Ml Oral.susp 2,400 Mg PO PRN 

QHS PRN


Culturelle (Lactobacillus Rhamnosus Gg) 1 Each Capsule 1 Each PO BID


Zyrtec (Cetirizine Hcl) 10 Mg Tablet 10 Mg PO DAILY


Vitamin C (Ascorbate Calcium) 500 Mg Tablet 500 Mg PO DAILY


Trazodone Hcl 50 Mg Tablet 50 Mg PO PRN QHS PRN


Risperdal (Risperidone) 1 Mg Tablet 1.5 Mg PO QHS


Zyprexa Zydis (Olanzapine) 5 Mg Tab.rapdis 5 Mg PO PRN Q2HR PRN


Lithium Carbonate 150 Mg Capsule 150 Mg PO DAILY


Miralax (Polyethylene Glycol 3350) 17 Gm Powd.pack 17 Gm PO BID


Triple Antibiotic Ointment (Neomy Sulf/Bacitra/Polymyxin B) 1 Each Packet 1 

Packet TP PRN DAILY PRN


     apply to finger wound.


Oxycodone Hcl 5 Mg Capsule 5 Mg PO PRN Q6HRS PRN


Lorazepam 0.5 Mg Tablet 0.5 Mg PO TID PRN PRN


Famotidine 20 Mg Tablet 20 Mg PO BID


Terazosin Hcl 10 Mg Capsule 10 Mg PO HS


Lithium Carbonate 300 Mg Capsule 300 Mg PO HS


Baclofen 10 Mg Tablet 10 Mg PO BID


Maalox Maximum Strength Susp (Mag Hydrox/Al Hydrox/Simeth) 355 Ml Oral.susp 30 

Ml PO PRN Q2HR PRN


Finasteride 5 Mg Tablet 5 Mg PO DAILY


Colace (Docusate Sodium) 100 Mg Capsule 100 Mg PO PRN BID PRN


Senokot (Sennosides) 8.6 Mg Tablet 8.6 Mg PO BID


Bisacodyl 10 Mg Supp.rect 10 Mg RC PRN DAILY PRN


Tylenol (Acetaminophen) 325 Mg Tablet 650 Mg PO PRN Q6HRS PRN


I have reviewed the current psychotropics carefully including drug 

interactions.  Risk benefit ratio favors no change other than as noted in my 

dictated progress note.





Diagnosis:


Problems:  


(1) Mild cognitive impairment


(2) Dementia, vascular, with delusions


(3) Bipolar affective, mixed, sev w/ psych


(4) Anxiety disorder











DIANA STALLINGS MD Jun 11, 2018 18:22

## 2018-06-11 NOTE — NUR
Transition Record was faxed to follow-up provider with the following elements:



Reason for admission, procedures, tests, principal diagnosis, pending studies, patient 
instructions, 24/7 contact information for unit, phone number to obtain pending test 
results, plan for follow-up care, physician follow-up, advanced directive information, and 
medication list with dose, duration and instructions.



This information was included in the following documents:



History and physical, lab results, study results, progress notes, social work planning form, 
DC instruction form, patient visit summary, and medication reconciliation form.



Date & time record faxed:[phone] 06/11/18 



Record faxed to: [facility] 



Record discussed with/ report given to: Delicia LIN

## 2018-06-12 NOTE — DS
DATE OF DISCHARGE:  06/11/2018



DISCHARGE SUMMARY AND PSYCHIATRIC NOTE



This is a late entry for 06/11/2018 and covers elements not covered in my

initial note of 06/11/2018.



REASON FOR ADMISSION:  Please refer to the admission history for details. 

Briefly, the patient is a 72-year-old  male who returns back to us

after medical stabilization at [facility] following the

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

stay on our unit, at that time when he was extremely manic and psychotic.  He

continued to be delusional, believed he was a  getting ready for

race, was agitated, psychotic, needed further stabilization of his bipolar

disorder, mixed episode with psychotic features, resulting in this referral.



SIGNIFICANT FINDINGS AND CLINICAL COURSE:  Following admission, the patient was

seen daily individually by myself from a psychiatric standpoint, medical

followup per Dr. Hodges/Dr. Wallace.  The patient remained quite manic, labile,

grandiose at times and delusional.  Adjustments were made in his psychotropics. 

He seemed to respond to a combination of lithium carbonate 150 mg a.m. and 300

mg at bedtime with a level of 0.8 along with Risperdal 1.5 mg at bedtime,

Remeron 15 mg at bedtime, Ativan and trazodone p.r.n., Zyprexa p.r.n. 

Gradually, mood appeared to improve.  Family were very pleased with his

progress.  He was much more oriented:  Psychotic symptoms resolved.  No suicidal

or homicidal ideation at discharge.



CONDITION AT DISCHARGE:  Improved.



REVIEW OF SYSTEMS:  Prior to discharge, ambulation impaired, in Broda chair.  No

CV, , pulmonary, eye, ENT system symptoms on review.



MENTAL STATUS EXAM:  Oriented to himself and situation.  Speech coherent, less

pressured.  Abstraction fair, computation impaired, language function intact. 

Mood and affect improved.



FINAL DIAGNOSES:  Bipolar 1 disorder, mixed with psychotic features, in partial

remission; anxiety disorder, unspecified; impulse control disorder, unspecified;

cognitive disorder, unspecified.  Rest unchanged from above.



Relevant to the patient's history is his past treatment on ECT for his bipolar

disorder in the distant past.



DISCHARGE MEDICATIONS:  Please refer to the EMRAD.



DISCHARGE INSTRUCTIONS:  Outpatient psychiatric and medical followup at the

nursing home.





______________________________

MAN COBY STALLINGS MD



DR:  Rea  JOB#:  [id number] / [id number]

DD:  06/12/2018 12:51  DT:  06/12/2018 13:15

## 2018-06-12 NOTE — PN
DATE:  06/10/2018



PSYCHIATRIC PROGRESS NOTE



This is a late entry for 06/10/2018, covers elements not covered in my initial

note.



SUBJECTIVE:  I met with the patient in the evening.  Overall, the patient is

being more coherent, cooperative.  Ambulation impaired, in Broda chair.  No CV,

, pulmonary, eye, ENT system symptoms on review.



MENTAL STATUS EXAM:  Oriented to himself and situation.  Speech has some

latency, coherent.  Abstraction fair, computation impaired, language function

intact, attention span short.  Mood and affect showing improvement, very verbal,

appropriate, pleasant as I met with him.



LABORATORY DATA:  Reviewed.



IMPRESSION:  Unchanged from initial note.



PLAN:  No change from a psychiatric standpoint, discharge this coming week.





______________________________

MAN COBY STALLINGS MD



DR:  BRIDGETT/timbo  JOB#:  [id number] / [id number]

DD:  06/11/2018 16:37  DT:  06/12/2018 01:57

## 2018-07-24 ENCOUNTER — HOSPITAL ENCOUNTER (INPATIENT)
Dept: HOSPITAL 63 - ER | Age: 73
LOS: 3 days | Discharge: NURSING, REHAB OR LONG-TERM CARE | DRG: 871 | End: 2018-07-27
Attending: INTERNAL MEDICINE | Admitting: INTERNAL MEDICINE
Payer: MEDICARE

## 2018-07-24 VITALS — HEIGHT: 69 IN | BODY MASS INDEX: 21.65 KG/M2 | WEIGHT: 146.19 LBS

## 2018-07-24 VITALS — SYSTOLIC BLOOD PRESSURE: 110 MMHG | DIASTOLIC BLOOD PRESSURE: 68 MMHG

## 2018-07-24 VITALS — SYSTOLIC BLOOD PRESSURE: 104 MMHG | DIASTOLIC BLOOD PRESSURE: 58 MMHG

## 2018-07-24 VITALS — SYSTOLIC BLOOD PRESSURE: 104 MMHG | DIASTOLIC BLOOD PRESSURE: 66 MMHG

## 2018-07-24 VITALS — SYSTOLIC BLOOD PRESSURE: 122 MMHG | DIASTOLIC BLOOD PRESSURE: 62 MMHG

## 2018-07-24 DIAGNOSIS — A41.9: Primary | ICD-10-CM

## 2018-07-24 DIAGNOSIS — J98.11: ICD-10-CM

## 2018-07-24 DIAGNOSIS — J18.9: ICD-10-CM

## 2018-07-24 DIAGNOSIS — Z86.73: ICD-10-CM

## 2018-07-24 DIAGNOSIS — F41.9: ICD-10-CM

## 2018-07-24 DIAGNOSIS — N40.1: ICD-10-CM

## 2018-07-24 DIAGNOSIS — I77.810: ICD-10-CM

## 2018-07-24 DIAGNOSIS — G30.9: ICD-10-CM

## 2018-07-24 DIAGNOSIS — F02.80: ICD-10-CM

## 2018-07-24 DIAGNOSIS — Z88.0: ICD-10-CM

## 2018-07-24 DIAGNOSIS — J96.01: ICD-10-CM

## 2018-07-24 DIAGNOSIS — N31.9: ICD-10-CM

## 2018-07-24 DIAGNOSIS — N13.8: ICD-10-CM
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
## 2018-07-24 DIAGNOSIS — L89.159: ICD-10-CM

## 2018-07-24 DIAGNOSIS — K59.09: ICD-10-CM

## 2018-07-24 DIAGNOSIS — E03.9: ICD-10-CM

## 2018-07-24 DIAGNOSIS — N39.0: ICD-10-CM

## 2018-07-24 DIAGNOSIS — F31.60: ICD-10-CM

## 2018-07-24 DIAGNOSIS — N18.9: ICD-10-CM

## 2018-07-24 DIAGNOSIS — G93.40: ICD-10-CM

## 2018-07-24 DIAGNOSIS — F01.50: ICD-10-CM

## 2018-07-24 DIAGNOSIS — G47.00: ICD-10-CM

## 2018-07-24 DIAGNOSIS — G82.50: ICD-10-CM

## 2018-07-24 LAB
ALBUMIN SERPL-MCNC: 2.6 G/DL (ref 3.4–5)
ALBUMIN/GLOB SERPL: 0.5 {RATIO} (ref 1–1.7)
ALP SERPL-CCNC: 88 U/L (ref 46–116)
ALT SERPL-CCNC: 24 U/L (ref 16–63)
AMORPH SED URNS QL MICRO: PRESENT /HPF
ANION GAP SERPL CALC-SCNC: 11 MMOL/L (ref 6–14)
APTT PPP: (no result) S
AST SERPL-CCNC: 24 U/L (ref 15–37)
BACTERIA #/AREA URNS HPF: (no result) /HPF
BASOPHILS # BLD AUTO: 0.1 X10^3/UL (ref 0–0.2)
BASOPHILS NFR BLD: 1 % (ref 0–3)
BGAS PCO2: 32 MMHG (ref 35–46)
BGAS PH: 7.48 (ref 7.35–7.46)
BGAS PO2: 133 MMHG (ref 71–100)
BILIRUB SERPL-MCNC: 0.4 MG/DL (ref 0.2–1)
BILIRUB UR QL STRIP: (no result)
BUN/CREAT SERPL: 22 (ref 6–20)
CA-I SERPL ISE-MCNC: 26 MG/DL (ref 8–26)
CALCIUM SERPL-MCNC: 9.3 MG/DL (ref 8.5–10.1)
CHLORIDE SERPL-SCNC: 106 MMOL/L (ref 98–107)
CO2 SERPL-SCNC: 25 MMOL/L (ref 21–32)
CREAT SERPL-MCNC: 1.2 MG/DL (ref 0.7–1.3)
DELTA BASE BGAS: 0 MMOL/L (ref 0–3)
EOSINOPHIL NFR BLD: 0.1 X10^3/UL (ref 0–0.7)
EOSINOPHIL NFR BLD: 1 % (ref 0–3)
ERYTHROCYTE [DISTWIDTH] IN BLOOD BY AUTOMATED COUNT: 15 % (ref 11.5–14.5)
FIBRINOGEN PPP-MCNC: (no result) MG/DL
GFR SERPLBLD BASED ON 1.73 SQ M-ARVRAT: 59.3 ML/MIN
GLOBULIN SER-MCNC: 4.9 G/DL (ref 2.2–3.8)
GLUCOSE SERPL-MCNC: 105 MG/DL (ref 70–99)
GLUCOSE UR STRIP-MCNC: (no result) MG/DL
HCT VFR BLD CALC: 38.4 % (ref 39–53)
HGB BLD-MCNC: 12.4 G/DL (ref 13–17.5)
LYMPHOCYTES # BLD: 3.6 X10^3/UL (ref 1–4.8)
LYMPHOCYTES NFR BLD AUTO: 29 % (ref 24–48)
MCH RBC QN AUTO: 29 PG (ref 25–35)
MCHC RBC AUTO-ENTMCNC: 32 G/DL (ref 31–37)
MCV RBC AUTO: 90 FL (ref 79–100)
MONO #: 1.2 X10^3/UL (ref 0–1.1)
MONOCYTES NFR BLD: 10 % (ref 0–9)
NEUT #: 7.3 X10^3UL (ref 1.8–7.7)
NEUTROPHILS NFR BLD AUTO: 59 % (ref 31–73)
NITRITE UR QL STRIP: (no result)
O2 SAT BGAS: 99 % (ref 92–99)
O2/TOTAL GAS SETTING VFR VENT: 100 %
PLATELET # BLD AUTO: 233 X10^3/UL (ref 140–400)
POTASSIUM SERPL-SCNC: 3.7 MMOL/L (ref 3.5–5.1)
PROT SERPL-MCNC: 7.5 G/DL (ref 6.4–8.2)
RBC # BLD AUTO: 4.27 X10^6/UL (ref 4.3–5.7)
RBC #/AREA URNS HPF: (no result) /HPF (ref 0–2)
SODIUM SERPL-SCNC: 142 MMOL/L (ref 136–145)
SP GR UR STRIP: 1.02
SQUAMOUS #/AREA URNS LPF: (no result) /LPF
UROBILINOGEN UR-MCNC: 0.2 MG/DL
WBC # BLD AUTO: 12.4 X10^3/UL (ref 4–11)
WBC #/AREA URNS HPF: (no result) /HPF (ref 0–4)

## 2018-07-24 PROCEDURE — 94640 AIRWAY INHALATION TREATMENT: CPT

## 2018-07-24 PROCEDURE — 80202 ASSAY OF VANCOMYCIN: CPT

## 2018-07-24 PROCEDURE — 87186 SC STD MICRODIL/AGAR DIL: CPT

## 2018-07-24 PROCEDURE — 87641 MR-STAPH DNA AMP PROBE: CPT

## 2018-07-24 PROCEDURE — 87040 BLOOD CULTURE FOR BACTERIA: CPT

## 2018-07-24 PROCEDURE — 36600 WITHDRAWAL OF ARTERIAL BLOOD: CPT

## 2018-07-24 PROCEDURE — 93005 ELECTROCARDIOGRAM TRACING: CPT

## 2018-07-24 PROCEDURE — 71275 CT ANGIOGRAPHY CHEST: CPT

## 2018-07-24 PROCEDURE — 80053 COMPREHEN METABOLIC PANEL: CPT

## 2018-07-24 PROCEDURE — 85651 RBC SED RATE NONAUTOMATED: CPT

## 2018-07-24 PROCEDURE — 80048 BASIC METABOLIC PNL TOTAL CA: CPT

## 2018-07-24 PROCEDURE — 82553 CREATINE MB FRACTION: CPT

## 2018-07-24 PROCEDURE — 83880 ASSAY OF NATRIURETIC PEPTIDE: CPT

## 2018-07-24 PROCEDURE — 96365 THER/PROPH/DIAG IV INF INIT: CPT

## 2018-07-24 PROCEDURE — 36415 COLL VENOUS BLD VENIPUNCTURE: CPT

## 2018-07-24 PROCEDURE — 85025 COMPLETE CBC W/AUTO DIFF WBC: CPT

## 2018-07-24 PROCEDURE — 81001 URINALYSIS AUTO W/SCOPE: CPT

## 2018-07-24 PROCEDURE — 84484 ASSAY OF TROPONIN QUANT: CPT

## 2018-07-24 PROCEDURE — 82803 BLOOD GASES ANY COMBINATION: CPT

## 2018-07-24 PROCEDURE — 80178 ASSAY OF LITHIUM: CPT

## 2018-07-24 PROCEDURE — 87086 URINE CULTURE/COLONY COUNT: CPT

## 2018-07-24 PROCEDURE — 84443 ASSAY THYROID STIM HORMONE: CPT

## 2018-07-24 PROCEDURE — 86140 C-REACTIVE PROTEIN: CPT

## 2018-07-24 PROCEDURE — 83605 ASSAY OF LACTIC ACID: CPT

## 2018-07-24 PROCEDURE — 71045 X-RAY EXAM CHEST 1 VIEW: CPT

## 2018-07-24 PROCEDURE — 51702 INSERT TEMP BLADDER CATH: CPT

## 2018-07-24 RX ADMIN — SENNOSIDES AND DOCUSATE SODIUM SCH TAB: 8.6; 5 TABLET ORAL at 21:00

## 2018-07-24 RX ADMIN — MEROPENEM SCH MLS/HR: 500 INJECTION, POWDER, FOR SOLUTION INTRAVENOUS at 22:00

## 2018-07-24 RX ADMIN — BACLOFEN SCH MG: 10 TABLET ORAL at 21:00

## 2018-07-24 RX ADMIN — LITHIUM CARBONATE SCH MG: 300 TABLET ORAL at 21:00

## 2018-07-24 RX ADMIN — TERAZOSIN HYDROCHLORIDE SCH MG: 5 CAPSULE ORAL at 21:00

## 2018-07-24 RX ADMIN — IPRATROPIUM BROMIDE AND ALBUTEROL SULFATE SCH ML: .5; 3 SOLUTION RESPIRATORY (INHALATION) at 16:56

## 2018-07-24 RX ADMIN — IPRATROPIUM BROMIDE AND ALBUTEROL SULFATE SCH ML: .5; 3 SOLUTION RESPIRATORY (INHALATION) at 10:56

## 2018-07-24 RX ADMIN — GABAPENTIN SCH MG: 100 CAPSULE ORAL at 21:00

## 2018-07-24 RX ADMIN — SODIUM CHLORIDE SCH MLS/HR: 0.9 INJECTION, SOLUTION INTRAVENOUS at 07:33

## 2018-07-24 RX ADMIN — IPRATROPIUM BROMIDE AND ALBUTEROL SULFATE SCH ML: .5; 3 SOLUTION RESPIRATORY (INHALATION) at 20:33

## 2018-07-24 RX ADMIN — MIRTAZAPINE SCH MG: 15 TABLET, FILM COATED ORAL at 21:00

## 2018-07-24 RX ADMIN — Medication SCH CAP: at 21:00

## 2018-07-24 RX ADMIN — SODIUM CHLORIDE SCH MLS/HR: 0.9 INJECTION, SOLUTION INTRAVENOUS at 06:52

## 2018-07-24 RX ADMIN — SODIUM CHLORIDE SCH MLS/HR: 0.9 INJECTION, SOLUTION INTRAVENOUS at 07:09

## 2018-07-24 RX ADMIN — RISPERIDONE SCH MG: 1 TABLET, FILM COATED ORAL at 21:00

## 2018-07-24 RX ADMIN — SODIUM CHLORIDE SCH MLS/HR: 0.9 INJECTION, SOLUTION INTRAVENOUS at 22:15

## 2018-07-24 RX ADMIN — MEROPENEM SCH MLS/HR: 500 INJECTION, POWDER, FOR SOLUTION INTRAVENOUS at 20:22

## 2018-07-24 RX ADMIN — IPRATROPIUM BROMIDE AND ALBUTEROL SULFATE SCH ML: .5; 3 SOLUTION RESPIRATORY (INHALATION) at 07:19

## 2018-07-24 RX ADMIN — SODIUM CHLORIDE SCH MLS/HR: 0.9 INJECTION, SOLUTION INTRAVENOUS at 22:14

## 2018-07-24 RX ADMIN — FAMOTIDINE SCH MG: 20 TABLET ORAL at 21:00

## 2018-07-24 RX ADMIN — GABAPENTIN SCH MG: 100 CAPSULE ORAL at 14:00

## 2018-07-24 RX ADMIN — VANCOMYCIN HYDROCHLORIDE PRN EACH: 1 INJECTION, POWDER, LYOPHILIZED, FOR SOLUTION INTRAVENOUS at 14:44

## 2018-07-24 NOTE — RAD
CTA chest with contrast

 

Indication: SOA, difficulty breathing. Prior stroke, unable to lift arms, 

suspend respirations, follow directions.OMNI 300/75ml .

 

Comparison: No comparison is available.

 

Technique: After intravenous contrast administration, CT imaging was 

performed of the chest. MIP reconstructions were obtained.

 

Exposure: One or more of the following individualized dose reduction 

techniques were utilized for this examination:  1. Automated exposure 

control  2. Adjustment of the mA and/or kV according to patient size  3. 

Use of iterative reconstruction technique.

 

FINDINGS:  

Pulmonary arteries:No evidence of pulmonary embolism.

Thoracic aorta: Mild pulsatility artifact at the ascending aorta, which is

mildly ectatic, measuring about 4 cm transverse diameter. No evidence of 

descending aortic dissection or descending aortic aneurysm.

 

Thyroid gland: Asymmetric in size

Lymph nodes:No significant enlargement

Heart: Coronary artery calcifications.

Esophagus: Mildly distended. Contains some debris compatible with food 

product.

Pleural spaces: No significant effusion

 

Lungs: Atelectasis/infiltrate in both lower lobes.

Trachea and central airways: Patent

 

Bones: Degenerative spondylosis. Right convexity thoracic spinal 

curvature.

 

Upper abdomen: Slices through the upper abdomen are limited due to the 

technique .No obvious acute findings.

 

IMPRESSION:

1. Negative for pulmonary embolism.

2. Mild infiltrate/atelectasis in both lung bases.

3. Thyroid gland asymmetry, correlate clinically.

4. Esophagus is mildly distended and contains food/debris, suggesting 

esophageal dysmotility or reflux.

5. Ascending aorta ectasia.

 

Electronically signed by: Joshua Morrissey MD (7/24/2018 8:52 AM) [facility]

## 2018-07-24 NOTE — PHYS DOC
Past History


Past Medical History:  Depression


Additional Past Medical Histor:  quadriplegic


Smoking:  Non-smoker





Adult General


Chief Complaint


Chief Complaint:  SHORTNESS OF BREATH





HPI


HPI





72-year-old male patient presents of nursing home with history of quadriplegia 

brought in by EMS because of shortness of breath and hypoxia. Nursing home 

reported that patient had O2 sat of 80% at room air with temperature of 102 and 

called 911. EMS reported the patient was not on oxygen and started on 

nonrebreather with improvement of his condition. Patient had O2 sat of 91% at 

room air at arrival to ER and able to answer the question with short sentences 

and denied chest pain.





Review of Systems


Review of Systems





Unable to obtain because of medical condition





Current Medications


Current Medications





Current Medications








 Medications


  (Trade)  Dose


 Ordered  Sig/Shila  Start Time


 Stop Time Status Last Admin


Dose Admin


 


 Albuterol/


 Ipratropium


  (Duoneb)  3 ml  1X  ONCE  18 07:00


 18 07:01   





 


 Levofloxacin/


 Dextrose  150 ml @ 


 150 mls/hr  1X  ONCE  18 07:00


 18 07:59   





 


 Methylprednisolone


 Sodium Succinate


  (SOLU-Medrol


 125MG VIAL)  125 mg  1X  ONCE  18 07:00


 18 07:01   





 


 Sodium Chloride  1,000 ml @ 


 1,000 mls/hr  Q1H  18 07:00


 18 07:59   





 


 Vancomycin HCl 1


 gm/Sodium Chloride  250 ml @ 


 250 mls/hr  1X  ONCE  18 06:45


 18 07:44 UNV  














Allergies


Allergies





Allergies








Coded Allergies Type Severity Reaction Last Updated Verified


 


  Penicillins Allergy Intermediate  18 Yes


 


  I S O L A T I O N *CONTACT* Allergy Unknown  18 Yes











Physical Exam


Physical Exam





Constitutional: Moderate distress, non-toxic appearance. []


HENT: Normocephalic, atraumatic, oropharynx dry,


Eyes: PERRLA, EOMI, conjunctiva normal, no discharge. [] 


Neck: Normal range of motion, no tenderness, supple, no stridor. [] 


Cardiovascular:Heart rate regular rhythm,  systolic murmur []


Lungs & Thorax: Marked respiratory distress with intercostal dissection and 

hyperventilation, bilateral rhonchi 


Abdomen: Bowel sounds normal, soft, no tenderness, no masses, no pulsatile 

masses. [] 


Skin: Warm, dry,  no rash, large grade 3 bedsore in sacral area with erythema 

filled with stool


Back: No tenderness, no CVA tenderness. [] 


Extremities: No tenderness, no cyanosis, no clubbing, ROM intact, no edema. [] 


Neurologic: Alert and oriented X 3, quadriplegic


Psychologic: Unable to evaluate





Current Patient Data


Lab Results





 Laboratory Tests








Test


 18


06:25 18


06:36


 


White Blood Count


 12.4 x10^3/uL


(4.0-11.0)  H 





 


Red Blood Count


 4.27 x10^6/uL


(4.30-5.70)  L 





 


Hemoglobin


 12.4 g/dL


(13.0-17.5)  L 





 


Hematocrit


 38.4 %


(39.0-53.0)  L 





 


Mean Corpuscular Volume


 90 fL ()


 





 


Mean Corpuscular Hemoglobin 29 pg (25-35)   


 


Mean Corpuscular Hemoglobin


Concent 32 g/dL


(31-37) 





 


Red Cell Distribution Width


 15.0 %


(11.5-14.5)  H 





 


Platelet Count


 233 x10^3/uL


(140-400) 





 


Neutrophils (%) (Auto) 59 % (31-73)   


 


Lymphocytes (%) (Auto) 29 % (24-48)   


 


Monocytes (%) (Auto) 10 % (0-9)  H 


 


Eosinophils (%) (Auto) 1 % (0-3)   


 


Basophils (%) (Auto) 1 % (0-3)   


 


Neutrophils # (Auto)


 7.3 x10^3uL


(1.8-7.7) 





 


Lymphocytes # (Auto)


 3.6 x10^3/uL


(1.0-4.8) 





 


Monocytes # (Auto)


 1.2 x10^3/uL


(0.0-1.1)  H 





 


Eosinophils # (Auto)


 0.1 x10^3/uL


(0.0-0.7) 





 


Basophils # (Auto)


 0.1 x10^3/uL


(0.0-0.2) 





 


Blood pH


 


 7.48


(7.35-7.46)  H


 


Blood Gas PCO2


 


 32 mmHg


(35-46)  L


 


Blood Gas PO2


 


 133 mmHg


()  H


 


Blood Gas HCO3


 


 24 mmol/L


(21-28)


 


Arterial Bld O2 Saturation


(Calc) 


 99 % (92-99)  





 


FiO2  100 %  











EKG


EKG


EKG interpreted by me. EKG at 0636 showed normal sinus rhythm at rate of 86, 

left fourth axis, multiple artifact, poor R-wave progress in anteroseptal lead, 

no acute ST and T-wave abnormalities[]





Radiology/Procedures


Radiology/Procedures


[facility] [address], [place] [address] [phone]


 


 IMAGING REPORT





 Signed





PATIENT: DILCIA STEINBERG ACCOUNT: [id number] MRN#: [id number]


: 1945 LOCATION: ER AGE: 72


SEX: M EXAM DT: 18 ACCESSION#: [id number]


STATUS: REG ER ORD. PHYSICIAN: OTIS CLEMENT MD 


REASON: SOB


PROCEDURE: PORTABLE CHEST 1V





AP chest.


 


HISTORY: Difficulty breathing, short of air


 


AP views were taken of the chest. Heart is normal in size without heart 


failure. Patient is rotated to the left. There is mild retrocardiac 


atelectasis or infiltrate in the left lung base. There are no other 


infiltrates. There is no definite effusion. A lateral view could be of 


benefit.


 


IMPRESSION:


1. Mild retrocardiac atelectasis or infiltrate in the left lung base.


 


Electronically signed by: Miguel Walker MD (2018 6:59 AM) [facility]














DICTATED AND SIGNED BY:     MIGUEL WALKER MD


DATE:     18





CC: OTIS CLEMENT MD; RY PHILIP ~





[][facility] [address], [place] [address] [phone]


 


 IMAGING REPORT





 Signed





PATIENT: DILCIA STEINBERG ACCOUNT: [id number] MRN#: [id number]


: 1945 LOCATION: ER AGE: 72


SEX: M EXAM DT: 18 ACCESSION#: [id number]


STATUS: REG ER ORD. PHYSICIAN: OTIS CLEMENT MD 


REASON: shortness of breath


PROCEDURE: CT ANGIOGRAPHY CHEST





CTA chest with contrast


 


Indication: SOA, difficulty breathing. Prior stroke, unable to lift arms, 


suspend respirations, follow directions.OMNI 300/75ml .


 


Comparison: No comparison is available.


 


Technique: After intravenous contrast administration, CT imaging was 


performed of the chest. MIP reconstructions were obtained.


 


Exposure: One or more of the following individualized dose reduction 


techniques were utilized for this examination:  1. Automated exposure 


control  2. Adjustment of the mA and/or kV according to patient size  3. 


Use of iterative reconstruction technique.


 


FINDINGS:  


Pulmonary arteries:No evidence of pulmonary embolism.


Thoracic aorta: Mild pulsatility artifact at the ascending aorta, which is


mildly ectatic, measuring about 4 cm transverse diameter. No evidence of 


descending aortic dissection or descending aortic aneurysm.


 


Thyroid gland: Asymmetric in size


Lymph nodes:No significant enlargement


Heart: Coronary artery calcifications.


Esophagus: Mildly distended. Contains some debris compatible with food 


product.


Pleural spaces: No significant effusion


 


Lungs: Atelectasis/infiltrate in both lower lobes.


Trachea and central airways: Patent


 


Bones: Degenerative spondylosis. Right convexity thoracic spinal 


curvature.


 


Upper abdomen: Slices through the upper abdomen are limited due to the 


technique .No obvious acute findings.


 


IMPRESSION:


1. Negative for pulmonary embolism.


2. Mild infiltrate/atelectasis in both lung bases.


3. Thyroid gland asymmetry, correlate clinically.


4. Esophagus is mildly distended and contains food/debris, suggesting 


esophageal dysmotility or reflux.


5. Ascending aorta ectasia.


 


Electronically signed by: Joshua Morrissey MD (2018 8:52 AM) [facility]














DICTATED AND SIGNED BY:     JOSHUA MORRISSEY MD


DATE:     18 0841





CC: OTIS CLEMENT MD; RY PHILIP ~





Course & Med Decision Making


Course & Med Decision Making


Pertinent Labs and Imaging studies reviewed. (See chart for details)


Evaluation of patient in ER showed 71-year-old male patient brought in with 

respiratory distress and hypoxia that improved with nebulizer treatment and 

nonrebreather oxygen. ABG showed O2 sat of 133 and patient wasn't started on 

nasal cannula. Chest x-ray showed bibasilar infiltrates. Patient was started on 

IV fluid and antibiotic and Dr. Hodges accepted admission at 0708.


[]





Dragon Disclaimer


Dragon Disclaimer


This electronic medical record was generated, in whole or in part, using a 

voice recognition dictation system.





Departure


Departure:


Impression:  


 Primary Impression:  


 Sepsis


 Additional Impressions:  


 HCAP (healthcare-associated pneumonia)


 Quadriplegia


 Dementia, vascular, with delusions


 Pressure sore on sacrum


Disposition:   ADMITTED AS INPATIENT (at 0708)


Admitting Physician:  Carroll, Ahmed


Condition:  GUARDED


Referrals:  


RY PHILIP (PCP)





Problem Qualifiers











OTIS CLEMENT MD 2018 06:57

## 2018-07-24 NOTE — EKG
[facility] [address], [place] [address]

Test Date:    2018               Test Time:    06:36:01

Pat Name:     DILCIA STEINBERG              Department:   

Patient ID:   [id number]           Room:          

Gender:       M                        Technician:   

:          1945               Requested By: OTIS CLEMENT

Order Number: [id number]            Reading MD:     

                                 Measurements

Intervals                              Axis          

Rate:         86                       P:            63

ND:           200                      QRS:          -14

QRSD:         90                       T:            19

QT:           332                                    

QTc:          400                                    

                           Interpretive Statements

SINUS RHYTHM

LEFTWARD AXIS

QRS(T) CONTOUR ABNORMALITY

CONSIDER INFERIOR MYOCARDIAL DAMAGE

POSSIBLY ABNORMAL ECG

RI6.01

Compared to ECG 2018 14:15:46

Left-axis deviation now present

## 2018-07-24 NOTE — NUR
Pharmacy Vancomycin Dosing Note



S:Consulted to monitor and dose vancomycin started 07/24/18.



O:DILCIA STEINBERG is a 72 year old M with 

Sepsis

CAP .



Height: 5 feet, 9 inches

Weight: 66.488525 kg

Ideal Body Weight: 70.70 

Adjusted Body Weight: 68.98 

Dosing Weight: Actual



Other Antibiotics: MERREM



LABS:

Last BUN: 26 

Last Creatinine: 1.2 

Creatinine Clearance: 51.5 

Last WBC: 12.4 

Last Platelets: 233 





Vancomycin Dosing:

Loading Dose: 1500 mg x1

Dosing Weight: Actual

Target Trough: 15-20





A: Initial dosing is based on height, actual weight, indication, and renal function.





P: 1. After loading dose (given in ER) give Vancomycin 1000 mg IV q24h.

   2. Follow up Trough level on 07/26/18 at 0630. 

   3. Pharmacy will continue to monitor, follow and adjust therapy as needed.

 

 LIV AKINS,  07/24/18 [id number]

## 2018-07-24 NOTE — NUR
The patient, DILCIA STEINBERG, 71 y/o, M admitted by SAMIR SCOTT MD, was given written 
information regarding hospital policies, unit procedures and contact persons.  



Valuables were checked and left in room with pt.

## 2018-07-24 NOTE — RAD
AP chest.

 

HISTORY: Difficulty breathing, short of air

 

AP views were taken of the chest. Heart is normal in size without heart 

failure. Patient is rotated to the left. There is mild retrocardiac 

atelectasis or infiltrate in the left lung base. There are no other 

infiltrates. There is no definite effusion. A lateral view could be of 

benefit.

 

IMPRESSION:

1. Mild retrocardiac atelectasis or infiltrate in the left lung base.

 

Electronically signed by: Miguel Walker MD (7/24/2018 6:59 AM) [facility]

## 2018-07-24 NOTE — NUR
IP: patient has hx of MRSA + nasal screen, requires contact precautions until 2 negative 
results 7 days apart.

## 2018-07-24 NOTE — HP
ADMIT DATE:  07/24/2018



HISTORY OF PRESENT ILLNESS:  The patient is a 72-year-old  male

patient, who was referred to the Emergency Room from [facility] as the patient was noted to have fever and shortness of breath together

with altered mental status and he was evaluated in the Emergency Room, was found

to have be septic, has leukocytosis.  His chest x-ray showed that he has mild

retrocardiac atelectasis infiltrate in the left lung.  He did have also CT angio

of the chest, which again showed that he has mild infiltrate, atelectasis in

both lung bases and his esophagus is mildly distended and contains food and

debris suggesting esophageal dysmotility or reflux.  He was also found to have

sacral decubitus ulcer and therefore the patient was admitted to be on telemetry

bed to continue with IV antibiotic as he is allergic to PENICILLIN.  He was

started on IV vancomycin as well as Levaquin for healthcare-associated pneumonia

and was admitted after obtaining the appropriate cultures.  The patient was

encephalopathic when I saw him.  I was not able to give any useful information.



PAST MEDICAL HISTORY:  Significant for hypothyroidism, benign prostatic

hypertrophy, chronic constipation, cervical myelopathy with quadriplegia as well

as neurogenic bladder requiring indwelling Gordon catheter.  He is known to have

chronic kidney disease and bladder outlet obstruction, requiring indwelling

Gordon catheter.



PAST PSYCHIATRIC HISTORY:  Significant for bipolar disorder.  He has racing

thoughts alternating with depression, has significant insomnia and worsening

mood swings.



FAMILY HISTORY:  Noncontributory.



SOCIAL HISTORY:  He is a resident at [facility].  He apparently

does not smoke, drink alcohol or use any recreational drugs.



REVIEW OF SYSTEMS:  Unobtainable.



ALLERGIES:  He is allergic to PENICILLIN.



MEDICATIONS:  He is currently on following medications:  He is on Zyrtec 10 mg

once a day, baclofen 10 mg twice a day, terazosin 10 mg at bedtime, analgesic

balm applied topically 4 times a day, oxycodone 5 mg every 6 hours, Tylenol 650

mg every 6 hours, gabapentin 100 mg 3 times a day, mirtazapine 15 mg at bedtime,

trazodone 50 mg at bedtime, olanzapine for Zyprexa 5 mg every 2 hours as needed

for psychosis.  He is on lithium 300 mg at bedtime and Lithium 150 mg daily.  He

is on Maalox 30 mL every 2 hours as needed, simethicone 80 mg as needed after

meals.  He is on bisacodyl 10 mg rectally daily p.r.n. for constipation, Colace

100 mg twice a day, milk of magnesia 30 mL daily p.r.n. for constipation,

polyethylene glycol 17 grams twice a day, senna 1 tablet twice a day,

ondansetron 4 mg every 8 hours, famotidine 20 mg twice a day, lactobacillus

rhamnosus for Culturelle 1 twice a day, levothyroxine 50 mcg once a day, triple

antibiotic ointment for wound, ascorbate calcium 500 mg once a day,

cholecalciferol vitamin D3 50,000 international unit once a week, multivitamin

with mineral 1 tablet once a day, finasteride 5 mg daily.



PHYSICAL EXAMINATION:

GENERAL:  When I examined him, he was pale, cachectic, but no jaundice,

cyanosis, or thyromegaly.  No jugular venous distension.  No lower limb edema.

VITAL SIGNS:  His heart rate was 84, blood pressure 141/81, temperature was 99.8

at the nursing home, and temperature was 102.9 degrees Fahrenheit.  His oxygen

saturation was 95% on room air.

HEAD, EYES, EARS, NOSE AND THROAT:  Showed normocephalic, atraumatic.

NECK:  Supple.

HEART:  Showed normal first and second heart sounds.  No gallop, rub or murmur.

CHEST:  Shows central trachea, equal bilateral expansion, air entry, vesicular

sounds and crepitation or rhonchi.

ABDOMEN:  Distended, soft, nontender.  No guarding or rigidity.  No

organomegaly.  All hernial orifice intact.  Bowel sounds normal.

NEUROLOGIC:  He was unresponsive.  He has quadriplegia with marked muscle

wasting and fixed flexion contraction of both upper and lower extremities.  He

has neurogenic bladder, requiring indwelling Gordon catheter.



LABORATORY DATA:  On admission showed a serum sodium 142, potassium 3.7,

chloride 106, bicarbonate 25, anion gap of 11, BUN 26, creatinine 1.2, estimated

GFR was 59 mL per minute, his glucose 105.  Lactic acid was 0.7, calcium was

9.3.  Total bilirubin, AST, ALT, alkaline phosphatase were normal.  His total

protein was 7.5, albumin was 2.6.  His blood gases showed a pH of 7.48, pCO2 of

32, pO2 of 133, bicarbonate 24.  Oxygen saturation was 99%.  His white cell

count was 12,400, hemoglobin 12.4, hematocrit 38, MCV 90 and platelet count of

233,000.  His urinalysis showed the urine was ina, turbid with a pH of 7.5

with specific gravity of 1.020.  There was large amount of protein, negative for

glucose, ketones, small amount of blood, positive for nitrite, negative for

urine bilirubin and moderate amount of leukocyte esterase.  There are 1-2 rbc's,

20-40 wbc's and too many bacteria.  His blood and urine was sent for culture and

sensitivity.  He has had a chest x-ray, which showed that there is mild

retrocardiac atelectasis infiltrate in the left lung and CT angio of the chest

showed that there is no evidence of pulmonary embolism.  This showed atelectasis

and infiltrate in both lower lobes.  There is negative for pulmonary embolism. 

There is mild infiltrate, atelectasis in both lung bases.  Thyroid gland is the

same.  The esophagus is mildly distended and contains food and debris suggesting

esophageal dysmotility, reflux.



PLAN:  Continue to treat the patient for healthcare-associated pneumonia,

possible urinary tract infection, has large sacral decubitus ulcer,

quadriplegia, neurogenic bladder requiring indwelling Gordon catheter.  The

patient has multiple other medical problems including hypothyroidism, chronic

constipation, benign prostatic hypertrophy.  I will continue with vancomycin and

Levaquin.  I will add also meropenem, given that he is allergic to PENICILLIN

and monitor his labs closely and decide on further management accordingly.





______________________________

SAMIR SCOTT MD



DR:  SHAWN/timbo  JOB#:  [id number] / [id number]

DD:  07/24/2018 13:38  DT:  07/24/2018 14:23

## 2018-07-25 VITALS — DIASTOLIC BLOOD PRESSURE: 53 MMHG | SYSTOLIC BLOOD PRESSURE: 108 MMHG

## 2018-07-25 VITALS — DIASTOLIC BLOOD PRESSURE: 69 MMHG | SYSTOLIC BLOOD PRESSURE: 129 MMHG

## 2018-07-25 VITALS — DIASTOLIC BLOOD PRESSURE: 57 MMHG | SYSTOLIC BLOOD PRESSURE: 94 MMHG

## 2018-07-25 VITALS — SYSTOLIC BLOOD PRESSURE: 109 MMHG | DIASTOLIC BLOOD PRESSURE: 64 MMHG

## 2018-07-25 VITALS — DIASTOLIC BLOOD PRESSURE: 85 MMHG | SYSTOLIC BLOOD PRESSURE: 150 MMHG

## 2018-07-25 LAB
ALBUMIN SERPL-MCNC: 2 G/DL (ref 3.4–5)
ALBUMIN/GLOB SERPL: 0.4 {RATIO} (ref 1–1.7)
ALP SERPL-CCNC: 65 U/L (ref 46–116)
ALT SERPL-CCNC: 21 U/L (ref 16–63)
ANION GAP SERPL CALC-SCNC: 9 MMOL/L (ref 6–14)
AST SERPL-CCNC: 11 U/L (ref 15–37)
BASOPHILS # BLD AUTO: 0 X10^3/UL (ref 0–0.2)
BASOPHILS NFR BLD: 0 % (ref 0–3)
BILIRUB SERPL-MCNC: 0.3 MG/DL (ref 0.2–1)
BUN/CREAT SERPL: 28 (ref 6–20)
CA-I SERPL ISE-MCNC: 28 MG/DL (ref 8–26)
CALCIUM SERPL-MCNC: 8.7 MG/DL (ref 8.5–10.1)
CHLORIDE SERPL-SCNC: 112 MMOL/L (ref 98–107)
CO2 SERPL-SCNC: 23 MMOL/L (ref 21–32)
CREAT SERPL-MCNC: 1 MG/DL (ref 0.7–1.3)
EOSINOPHIL NFR BLD: 0 % (ref 0–3)
EOSINOPHIL NFR BLD: 0 X10^3/UL (ref 0–0.7)
ERYTHROCYTE [DISTWIDTH] IN BLOOD BY AUTOMATED COUNT: 14.8 % (ref 11.5–14.5)
GFR SERPLBLD BASED ON 1.73 SQ M-ARVRAT: 73.2 ML/MIN
GLOBULIN SER-MCNC: 4.5 G/DL (ref 2.2–3.8)
GLUCOSE SERPL-MCNC: 105 MG/DL (ref 70–99)
HCT VFR BLD CALC: 31.4 % (ref 39–53)
HGB BLD-MCNC: 10.2 G/DL (ref 13–17.5)
LYMPHOCYTES # BLD: 1.8 X10^3/UL (ref 1–4.8)
LYMPHOCYTES NFR BLD AUTO: 16 % (ref 24–48)
MCH RBC QN AUTO: 29 PG (ref 25–35)
MCHC RBC AUTO-ENTMCNC: 32 G/DL (ref 31–37)
MCV RBC AUTO: 89 FL (ref 79–100)
MONO #: 1.2 X10^3/UL (ref 0–1.1)
MONOCYTES NFR BLD: 11 % (ref 0–9)
NEUT #: 8.1 X10^3UL (ref 1.8–7.7)
NEUTROPHILS NFR BLD AUTO: 73 % (ref 31–73)
PLATELET # BLD AUTO: 210 X10^3/UL (ref 140–400)
POTASSIUM SERPL-SCNC: 3.3 MMOL/L (ref 3.5–5.1)
PROT SERPL-MCNC: 6.5 G/DL (ref 6.4–8.2)
RBC # BLD AUTO: 3.54 X10^6/UL (ref 4.3–5.7)
SODIUM SERPL-SCNC: 144 MMOL/L (ref 136–145)
WBC # BLD AUTO: 11.1 X10^3/UL (ref 4–11)

## 2018-07-25 RX ADMIN — FAMOTIDINE SCH MG: 20 TABLET ORAL at 11:07

## 2018-07-25 RX ADMIN — Medication SCH CAP: at 11:07

## 2018-07-25 RX ADMIN — TERAZOSIN HYDROCHLORIDE SCH MG: 5 CAPSULE ORAL at 21:29

## 2018-07-25 RX ADMIN — IPRATROPIUM BROMIDE AND ALBUTEROL SULFATE SCH ML: .5; 3 SOLUTION RESPIRATORY (INHALATION) at 08:59

## 2018-07-25 RX ADMIN — SODIUM CHLORIDE SCH MLS/HR: 0.9 INJECTION, SOLUTION INTRAVENOUS at 05:10

## 2018-07-25 RX ADMIN — Medication SCH CAP: at 21:26

## 2018-07-25 RX ADMIN — SENNOSIDES AND DOCUSATE SODIUM SCH TAB: 8.6; 5 TABLET ORAL at 11:07

## 2018-07-25 RX ADMIN — GABAPENTIN SCH MG: 100 CAPSULE ORAL at 14:13

## 2018-07-25 RX ADMIN — LITHIUM CARBONATE SCH MG: 300 TABLET ORAL at 21:29

## 2018-07-25 RX ADMIN — FAMOTIDINE SCH MG: 20 TABLET ORAL at 21:29

## 2018-07-25 RX ADMIN — MEROPENEM SCH MLS/HR: 500 INJECTION, POWDER, FOR SOLUTION INTRAVENOUS at 21:30

## 2018-07-25 RX ADMIN — BACLOFEN SCH MG: 10 TABLET ORAL at 11:08

## 2018-07-25 RX ADMIN — FINASTERIDE SCH MG: 5 TABLET, FILM COATED ORAL at 11:15

## 2018-07-25 RX ADMIN — GABAPENTIN SCH MG: 100 CAPSULE ORAL at 11:07

## 2018-07-25 RX ADMIN — MEROPENEM SCH MLS/HR: 500 INJECTION, POWDER, FOR SOLUTION INTRAVENOUS at 05:10

## 2018-07-25 RX ADMIN — CETIRIZINE HYDROCHLORIDE SCH MG: 10 TABLET, FILM COATED ORAL at 11:07

## 2018-07-25 RX ADMIN — MIRTAZAPINE SCH MG: 15 TABLET, FILM COATED ORAL at 21:27

## 2018-07-25 RX ADMIN — LITHIUM CARBONATE SCH MG: 300 TABLET ORAL at 11:08

## 2018-07-25 RX ADMIN — IPRATROPIUM BROMIDE AND ALBUTEROL SULFATE SCH ML: .5; 3 SOLUTION RESPIRATORY (INHALATION) at 05:02

## 2018-07-25 RX ADMIN — IPRATROPIUM BROMIDE AND ALBUTEROL SULFATE SCH ML: .5; 3 SOLUTION RESPIRATORY (INHALATION) at 20:10

## 2018-07-25 RX ADMIN — IPRATROPIUM BROMIDE AND ALBUTEROL SULFATE SCH ML: .5; 3 SOLUTION RESPIRATORY (INHALATION) at 15:36

## 2018-07-25 RX ADMIN — RISPERIDONE SCH MG: 1 TABLET, FILM COATED ORAL at 21:29

## 2018-07-25 RX ADMIN — POLYETHYLENE GLYCOL 3350 SCH GM: 17 POWDER, FOR SOLUTION ORAL at 21:00

## 2018-07-25 RX ADMIN — GABAPENTIN SCH MG: 100 CAPSULE ORAL at 21:26

## 2018-07-25 RX ADMIN — BACLOFEN SCH MG: 10 TABLET ORAL at 21:29

## 2018-07-25 RX ADMIN — LEVOTHYROXINE SODIUM SCH MCG: 50 TABLET ORAL at 11:08

## 2018-07-25 RX ADMIN — MULTIPLE VITAMINS W/ MINERALS TAB SCH TAB: TAB at 11:08

## 2018-07-25 RX ADMIN — OXYCODONE HYDROCHLORIDE AND ACETAMINOPHEN SCH MG: 500 TABLET ORAL at 11:07

## 2018-07-25 RX ADMIN — MEROPENEM SCH MLS/HR: 500 INJECTION, POWDER, FOR SOLUTION INTRAVENOUS at 12:58

## 2018-07-25 RX ADMIN — SENNOSIDES AND DOCUSATE SODIUM SCH TAB: 8.6; 5 TABLET ORAL at 21:00

## 2018-07-25 RX ADMIN — SODIUM CHLORIDE SCH MLS/HR: 0.9 INJECTION, SOLUTION INTRAVENOUS at 23:15

## 2018-07-25 RX ADMIN — VANCOMYCIN HYDROCHLORIDE SCH MLS/HR: 1 INJECTION, POWDER, LYOPHILIZED, FOR SOLUTION INTRAVENOUS at 14:15

## 2018-07-25 NOTE — NUR
PATIENT IN BED AT SHIFT CHANGE. PATIENT REPORTS NO PAIN AT THIS TIME. PATIENT IS CALM AND 
COOPERATIVE WITH ASSESSMENT. PATIENT IS TO HAVE SWALLOW STUDY TODAY TO EVALUATE PATIENTS 
SWALLOWING ABILITIES, PATIENT REMAINS NPO UNTIL SWALLOW STUDY. PATIENT IS ORIENTED TO SELF 
ONLY. PATIENT IS RESTING COMFORTABLE IN BED AT THIS TIME.

## 2018-07-25 NOTE — PDOC
Exam


Note:


Shai Note:


Please also refer to the separate dictated note~for this date of service 

dictated separately.~Patient seen individually. Discussed the patient with 

Nursing staff reviewed the chart.~Reviewed interim history and current 

functioning. Reviewed vital signs,~Labs/ Radiology~and current medications 

noted below. Continue current treatment with the changes noted in the dictated 

addendum note





Assessment:


Vital Signs:





 Vital Signs








  Date Time  Temp Pulse Resp B/P (MAP) Pulse Ox O2 Delivery O2 Flow Rate FiO2


 


7/25/18 15:33 108.0 60 20 108/53 (71) 97 Nasal Cannula 2.0 








I&O











Intake and Output 


 


 7/25/18





 06:59


 


Intake Total 1701.56 ml


 


Output Total 1100 ml


 


Balance 601.56 ml


 


 


 


Intake Oral 0 ml


 


IV Total 1701.56 ml


 


Output Urine Total 1100 ml


 


# Voids 2








Labs:





 Laboratory Tests








Test


 7/24/18


18:10 7/25/18


06:23


 


Nasal Screen MRSA (PCR)


 Negative


(Negative) 





 


White Blood Count


 


 11.1 x10^3/uL


(4.0-11.0)  H


 


Red Blood Count


 


 3.54 x10^6/uL


(4.30-5.70)  L


 


Hemoglobin


 


 10.2 g/dL


(13.0-17.5)  L


 


Hematocrit


 


 31.4 %


(39.0-53.0)  L


 


Mean Corpuscular Volume


 


 89 fL ()





 


Mean Corpuscular Hemoglobin  29 pg (25-35)  


 


Mean Corpuscular Hemoglobin


Concent 


 32 g/dL


(31-37)


 


Red Cell Distribution Width


 


 14.8 %


(11.5-14.5)  H


 


Platelet Count


 


 210 x10^3/uL


(140-400)


 


Neutrophils (%) (Auto)  73 % (31-73)  


 


Lymphocytes (%) (Auto)  16 % (24-48)  L


 


Monocytes (%) (Auto)  11 % (0-9)  H


 


Eosinophils (%) (Auto)  0 % (0-3)  


 


Basophils (%) (Auto)  0 % (0-3)  


 


Neutrophils # (Auto)


 


 8.1 x10^3uL


(1.8-7.7)  H


 


Lymphocytes # (Auto)


 


 1.8 x10^3/uL


(1.0-4.8)


 


Monocytes # (Auto)


 


 1.2 x10^3/uL


(0.0-1.1)  H


 


Eosinophils # (Auto)


 


 0.0 x10^3/uL


(0.0-0.7)


 


Basophils # (Auto)


 


 0.0 x10^3/uL


(0.0-0.2)


 


Sodium Level


 


 144 mmol/L


(136-145)


 


Potassium Level


 


 3.3 mmol/L


(3.5-5.1)  L


 


Chloride Level


 


 112 mmol/L


()  H


 


Carbon Dioxide Level


 


 23 mmol/L


(21-32)


 


Anion Gap  9 (6-14)  


 


Blood Urea Nitrogen


 


 28 mg/dL


(8-26)  H


 


Creatinine


 


 1.0 mg/dL


(0.7-1.3)


 


Estimated GFR


(Cockcroft-Gault) 


 73.2  





 


BUN/Creatinine Ratio  28 (6-20)  H


 


Glucose Level


 


 105 mg/dL


(70-99)  H


 


Calcium Level


 


 8.7 mg/dL


(8.5-10.1)


 


Total Bilirubin


 


 0.3 mg/dL


(0.2-1.0)


 


Aspartate Amino Transferase


(AST) 


 11 U/L (15-37)


L


 


Alanine Aminotransferase (ALT)


 


 21 U/L (16-63)





 


Alkaline Phosphatase


 


 65 U/L


()


 


Total Protein


 


 6.5 g/dL


(6.4-8.2)


 


Albumin


 


 2.0 g/dL


(3.4-5.0)  L


 


Albumin/Globulin Ratio


 


 0.4 (1.0-1.7)


L











Current Medications:


Meds:





Current Medications


Sodium Chloride 1,000 ml @  1,000 mls/hr Q1H IV  Last administered on 7/24/18at 

07:33;  Start 7/24/18 at 07:00;  Stop 7/24/18 at 07:59;  Status DC


Albuterol/ Ipratropium (Duoneb) 3 ml 1X  ONCE NEB  Last administered on 7/24/ 18at 06:51;  Start 7/24/18 at 07:00;  Stop 7/24/18 at 07:01;  Status DC


Methylprednisolone Sodium Succinate (SOLU-Medrol 125MG VIAL) 125 mg 1X  ONCE IV

  Last administered on 7/24/18at 06:51;  Start 7/24/18 at 07:00;  Stop 7/24/18 

at 07:01;  Status DC


Levofloxacin/ Dextrose 150 ml @  150 mls/hr 1X  ONCE IV  Last administered on 7/ 24/18at 07:31;  Start 7/24/18 at 07:00;  Stop 7/24/18 at 07:59;  Status DC


Vancomycin HCl 1 gm/Sodium Chloride 250 ml @  250 mls/hr 1X  ONCE IV ;  Start 7/ 24/18 at 06:45;  Stop 7/24/18 at 07:44;  Status UNV


Vancomycin HCl 1.5 gm/Sodium Chloride 500 ml @  250 mls/hr 1X  ONCE IV ;  Start 

7/24/18 at 07:00;  Stop 7/24/18 at 08:59;  Status Cancel


Vancomycin HCl (Vanco Per Pharmacy) 1 each PRN DAILY  PRN MC SEE COMMENTS Last 

administered on 7/24/18at 14:44;  Start 7/24/18 at 07:00


Vancomycin HCl 1.5 gm/Sodium Chloride 500 ml @  250 mls/hr 1X  ONCE IV ;  Start 

7/24/18 at 07:15;  Stop 7/24/18 at 07:15;  Status DC


Vancomycin HCl 1.5 gm/Sodium Chloride 500 ml @  250 mls/hr 1X  ONCE IV  Last 

administered on 7/24/18at 15:07;  Start 7/24/18 at 07:15;  Stop 7/24/18 at 09:14

;  Status DC


Sodium Chloride 1,000 ml @  150 mls/hr Q6H40M IV  Last administered on 7/25/ 18at 05:10;  Start 7/24/18 at 07:09;  Stop 7/25/18 at 07:08;  Status DC


Albuterol/ Ipratropium (Duoneb) 3 ml RTQID NEB  Last administered on 7/24/18at 

20:33;  Start 7/24/18 at 08:00;  Stop 7/25/18 at 07:59;  Status DC


Iohexol (Omnipaque 300 Mg/ml) 75 ml 1X  ONCE IV ;  Start 7/24/18 at 08:00;  

Stop 7/24/18 at 08:01;  Status DC


Meropenem 500 mg/ Sodium Chloride 50 ml @  100 mls/hr Q8HRS IV  Last 

administered on 7/25/18at 12:58;  Start 7/24/18 at 14:00


Acetaminophen (Tylenol) 650 mg PRN Q6HRS  PRN PO PAIN / TEMP;  Start 7/24/18 at 

13:45


Vitamin D (Vitamin D3) 50,000 unit WEEKLY PO ;  Start 7/31/18 at 09:00


Gabapentin (Neurontin) 100 mg TID PO  Last administered on 7/25/18at 14:13;  

Start 7/24/18 at 14:00


Al Hydroxide/Mg Hydroxide (Mylanta Plus Xs) 30 ml PRN Q2HR  PRN PO DYSPEPSIA;  

Start 7/24/18 at 13:45


Magnesium Hydroxide (Milk Of Magnesia) 2,400 mg PRN QHS  PRN PO CONSTIPATION;  

Start 7/24/18 at 13:45


Multi-Ingredient Ointment (Analgesic Balm) 1 bhavna PRN QID  PRN TP muscle pain;  

Start 7/24/18 at 13:45


Mirtazapine (Remeron) 15 mg QHS PO  Last administered on 7/24/18at 21:00;  

Start 7/24/18 at 21:00


Multivitamins/ Calcium (Thera-M Plus) 1 tab DAILY PO  Last administered on 7/25/ 18at 11:08;  Start 7/25/18 at 09:00


Neomycin/ Polymyxin/ Bacitracin (Triple Antibiotic Ointment) 1 pkt PRN DAILY  

PRN TP Wound Healing;  Start 7/24/18 at 13:45


Olanzapine (ZyPREXA ZYDIS) 5 mg PRN Q2HR  PRN PO PSYCHOSIS;  Start 7/24/18 at 13

:45


Ondansetron HCl (Zofran Odt) 4 mg PRN Q8HRS  PRN PO NAUSEA/VOMITING;  Start 7/24 /18 at 13:45


Simethicone (Gas-X) 80 mg PRN AFTMEALHC  PRN PO GAS / BLOATING;  Start 7/24/18 

at 13:45


Ascorbic Acid (Vitamin C) 500 mg DAILY PO  Last administered on 7/25/18at 11:07

;  Start 7/25/18 at 09:00


Baclofen (Lioresal) 10 mg BID PO  Last administered on 7/25/18at 11:08;  Start 7 /24/18 at 21:00


Bisacodyl (Dulcolax Supp) 10 mg PRN DAILY  PRN RC CONSTIPATION;  Start 7/24/18 

at 13:45


Cetirizine HCl (ZyrTEC) 10 mg DAILY PO  Last administered on 7/25/18at 11:07;  

Start 7/25/18 at 09:00


Docusate Sodium (Colace) 100 mg PRN BID  PRN PO CONSTIPATION;  Start 7/24/18 at 

14:15


Famotidine (Pepcid) 20 mg BID PO  Last administered on 7/25/18at 11:07;  Start 7 /24/18 at 21:00


Finasteride (Proscar) 5 mg DAILY PO  Last administered on 7/25/18at 11:15;  

Start 7/25/18 at 09:00


Lactobacillus Rhamnosus (Culturelle) 1 cap BID PO  Last administered on 7/25/ 18at 11:07;  Start 7/24/18 at 21:00


Levothyroxine Sodium (Synthroid) 50 mcg DAILY07 PO  Last administered on 7/25/ 18at 11:08;  Start 7/25/18 at 07:00


Lithium Carbonate 150 mg DAILY PO  Last administered on 7/25/18at 11:08;  Start 

7/25/18 at 09:00


Lithium Carbonate 300 mg QHS PO ;  Start 7/24/18 at 21:00


Lorazepam (Ativan) 0.5 mg TID PRN  PRN PO ANXIETY / AGITATION;  Start 7/24/18 

at 13:45


Oxycodone HCl (Roxicodone) 5 mg PRN Q6HRS  PRN PO PAIN Last administered on 7/25 /18at 14:13;  Start 7/24/18 at 13:45


Polyethylene Glycol (miraLAX) 17 gm BID PO ;  Start 7/25/18 at 21:00


Risperidone (RisperDAL) 1.5 mg QHS PO ;  Start 7/24/18 at 21:00


Senna/Docusate Sodium (Senna Plus) 1 tab BID PO  Last administered on 7/25/18at 

11:07;  Start 7/24/18 at 21:00


Terazosin HCl (Hytrin) 10 mg QHS PO ;  Start 7/24/18 at 21:00


Trazodone HCl (Desyrel) 50 mg PRN QHS  PRN PO INSOMNIA, MAY REPEAT X1;  Start 7/ 24/18 at 13:45


Fentanyl Citrate (Fentanyl 2ml Vial) 50 mcg PRN Q3HRS  PRN IV PAIN Last 

administered on 7/25/18at 12:57;  Start 7/24/18 at 14:15


Vancomycin HCl 1 gm/Sodium Chloride 250 ml @  250 mls/hr Q24H IV  Last 

administered on 7/25/18at 14:15;  Start 7/25/18 at 15:00


Vancomycin HCl (Vancomycin Trough Level) 1 each 1X  ONCE MC ;  Start 7/26/18 at 

14:30;  Stop 7/26/18 at 14:31


Albuterol/ Ipratropium (Duoneb) 3 ml RTQID NEB  Last administered on 7/25/18at 

15:36;  Start 7/25/18 at 08:00





Active Scripts


Active


Reported


D3-50 (Cholecalciferol (Vitamin D3)) 50,000 Unit Capsule 1 Cap PO WEEKLY


Analgesic Balm (Methyl Salicylate/Menthol) 28 Gm Oint...g. 1 Bhavna TP PRN QID PRN


Levothyroxine Sodium 50 Mcg Tablet 50 Mcg PO DAILY06


Gabapentin 100 Mg Capsule 100 Mg PO TID


Zofran Odt (Ondansetron) 4 Mg Tab.rapdis 4 Mg PO PRN Q8HRS PRN


Simethicone 80 Mg Tab.chew 80 Mg PO PRN AFTMEALHC PRN


Thera M Plus Tablet (Multivits,Ca,Minerals/Iron/FA) 1 Each Tablet 1 Tab PO DAILY


Mirtazapine 7.5 Mg Tablet 15 Mg PO QHS


Milk Of Magnesia (Magnesium Hydroxide) 400 Mg/5 Ml Oral.susp 2,400 Mg PO PRN 

QHS PRN


Culturelle (Lactobacillus Rhamnosus Gg) 1 Each Capsule 1 Each PO BID


Zyrtec (Cetirizine Hcl) 10 Mg Tablet 10 Mg PO DAILY


Vitamin C (Ascorbate Calcium) 500 Mg Tablet 500 Mg PO DAILY


Trazodone Hcl 50 Mg Tablet 50 Mg PO PRN QHS PRN


Risperdal (Risperidone) 1 Mg Tablet 1.5 Mg PO QHS


Zyprexa Zydis (Olanzapine) 5 Mg Tab.rapdis 5 Mg PO PRN Q2HR PRN


Lithium Carbonate 150 Mg Capsule 150 Mg PO DAILY


Miralax (Polyethylene Glycol 3350) 17 Gm Powd.pack 17 Gm PO BID


Triple Antibiotic Ointment (Neomy Sulf/Bacitra/Polymyxin B) 1 Each Packet 1 

Packet TP PRN DAILY PRN


     apply to finger wound.


Oxycodone Hcl 5 Mg Capsule 5 Mg PO PRN Q6HRS PRN


Lorazepam 0.5 Mg Tablet 0.5 Mg PO TID PRN PRN


Famotidine 20 Mg Tablet 20 Mg PO BID


Terazosin Hcl 10 Mg Capsule 10 Mg PO HS


Lithium Carbonate 300 Mg Capsule 300 Mg PO HS


Baclofen 10 Mg Tablet 10 Mg PO BID


Maalox Maximum Strength Susp (Mag Hydrox/Al Hydrox/Simeth) 355 Ml Oral.susp 30 

Ml PO PRN Q2HR PRN


Finasteride 5 Mg Tablet 5 Mg PO DAILY


Colace (Docusate Sodium) 100 Mg Capsule 100 Mg PO PRN BID PRN


Senokot (Sennosides) 8.6 Mg Tablet 8.6 Mg PO BID


Bisacodyl 10 Mg Supp.rect 10 Mg RC PRN DAILY PRN


Tylenol (Acetaminophen) 325 Mg Tablet 650 Mg PO PRN Q6HRS PRN


I have reviewed the current psychotropics carefully including drug 

interactions.  Risk benefit ratio favors no change other than as noted in my 

dictated progress note.





Diagnosis:


Problems:  


(1) HCAP (healthcare-associated pneumonia)


(2) Pressure sore on sacrum


(3) Dementia, vascular, with delusions


(4) Bipolar affective, mixed, sev w/ psych


(5) Anxiety disorder


(6) Mild cognitive impairment











DIANA STALLINGS MD Jul 25, 2018 17:05

## 2018-07-25 NOTE — CONS
DATE OF CONSULTATION:  07/24/2018



PSYCHIATRIC CONSULTATION



This late entry 07/24/2018 covers elements, not covered in my initial note.



IDENTIFYING DATA:  The patient is a 72-year-old  male seen in bed 124,

1 [facility] for a psychiatric consult requested by Dr. Hodges on

account of the patient's worsening confusion, mental status changes within the

context of his bipolar disorder, mixed episode along with dementia, vascular

with delusions, mild cognitive impairment, after he was admitted via the

Emergency Room, sent in from [facility] in [place] and

admitted consequent to sepsis and atelectasis along with esophageal dysmotility

or reflux.  I have been asked to consult to make any recommendation from a

psychiatric standpoint to help relieve some of the confusion.  The patient is

seen individually, discussed with nursing staff, reviewed the chart.



CHIEF COMPLAINT:  "I am okay."  The patient was unaware of where he was, did

not seem to recognize me even though I have seen him in the past at the nursing

home.



HISTORY OF PRESENT ILLNESS:  The patient has been admitted with a

community-acquired pneumonia as noted above and possible additional UTI.  He is

appeared more confused.  He does have a history of bipolar disorder with

episodic inez alternating with depression and psychotic symptoms, but the

confusion has been more significant recently.  No active suicidal or homicidal

ideation.  He has had some sleep and appetite changes.



PAST PSYCHIATRIC HISTORY:  As above.



PAST MEDICAL HISTORY:  As noted.  In addition, he has hypothyroidism, BPH,

chronic constipation, cervical myelopathy with quadriplegia, neurogenic bladder,

indwelling Gordon catheter, chronic kidney disease, bladder outlet obstruction.



FAMILY HISTORY:  Noncontributory.



SOCIAL HISTORY:  Resides at [facility].  No alcohol or drug abuse

history.



ALLERGIES:  PENICILLIN.



CURRENT PSYCHOTROPICS:  Trazodone 50 mg at bedtime, Zyprexa 5 mg q. 2 hours

p.r.n. psychosis, lithium carbonate 150 mg a.m. and 300 at bedtime.  He is also

on vitamin D.



MENTAL STATUS EXAM:  The patient was seen individually.  He was lying in bed,

unable to recognize me.  Speech moderate latency, low in rate and rhythm, often

responses monosyllabic.  Insight, judgment, recent and remote memory, attention,

concentration, fund of knowledge poor, consistent with his diagnosis.



LABORATORY DATA:  Reviewed.  Lithium level has not been drawn, but we will

request it.



IMPRESSION:  Bipolar 1 disorder, mixed with psychotic features; anxiety

disorder, unspecified.  Major neurocognitive disorder, Alzheimer, vascular with

delusion, depression.  Rest as above.



PLAN:  From a psychiatric standpoint, we will check a lithium level.  Continue

current psychotropics, monitor and I would be happy to follow the patient during

his stay in the hospital.



Dr. Hodges, thank you for the opportunity to participate in your patient's care.





______________________________

MAN COBY STALLINGS MD



DR:  BRIDGETT/timbo  JOB#:  [id number] / [id number]

DD:  07/25/2018 17:01  DT:  07/25/2018 20:04

## 2018-07-25 NOTE — NUR
Meropenem 1400 dose missed. Administered dose at 2000, non-administered 2200 dose and resume 
schedule as normal per pharmacy's recommendations.

## 2018-07-25 NOTE — PDOC
Exam


Note:


Shai Note:


Please also refer to the separate dictated note~for this date of service 

dictated separately.~Patient seen individually. Discussed the patient with 

Nursing staff reviewed the chart.~Reviewed interim history and current 

functioning. Reviewed vital signs,~Labs/ Radiology~and current medications 

noted below. Continue current treatment with the changes noted in the dictated 

addendum note. Late entry for 7/24/18





Assessment:


Vital Signs:





 VS - Last 72 Hours, by Label








  Date Time  Temp Pulse Resp B/P (MAP) Pulse Ox O2 Delivery O2 Flow Rate FiO2


 


7/25/18 15:33 108.0 60 20 108/53 (71) 97 Nasal Cannula 2.0 


 


7/25/18 15:13   18  97 Nasal Cannula 3.0 


 


7/25/18 14:13     97 Nasal Cannula 3.0 


 


7/25/18 13:27   20  97 Nasal Cannula 3.0 


 


7/25/18 12:57   18  97 Nasal Cannula 3.0 


 


7/25/18 10:57 98.0 69 20 129/69 (89) 97 Room Air  


 


7/25/18 09:52   20   Room Air  


 


7/25/18 09:00     97 Nasal Cannula 3.0 


 


7/25/18 08:50      Nasal Cannula 3.0 


 


7/25/18 06:10 97.4 68 22 150/85 (106) 94 Nasal Cannula 2.0 


 


7/25/18 05:11   18   Nasal Cannula 3.0 


 


7/25/18 05:04     97 Nasal Cannula 3.0 


 


7/24/18 22:49 98.6 76 26 122/62 (82) 97 Nasal Cannula 2.0 


 


7/24/18 22:47   20   Nasal Cannula 3.0 


 


7/24/18 20:35     98 Nasal Cannula 2.0 


 


7/24/18 20:10 98.7 78 22 104/58 (73) 92 Nasal Cannula 2.0 


 


7/24/18 20:00      Nasal Cannula 3.0 


 


7/24/18 16:56     100 Nasal Cannula 3.0 


 


7/24/18 15:00 98.2 70 18 110/68 (82) 97 Nasal Cannula 2.0 


 


7/24/18 10:56     99 Nasal Cannula 4.0 


 


7/24/18 10:00      Nasal Cannula 4.0 


 


7/24/18 09:22 98.5 74 18 104/66 (79) 97 Room Air  


 


7/24/18 09:20  81 22 128/70 (89) 99 Room Air 4.0 


 


7/24/18 07:19     96 Nasal Cannula 6.0 


 


7/24/18 06:53  78 18 133/78 (96)    


 


7/24/18 06:51     99 Nasal Cannula 6.0 


 


7/24/18 06:30  84 24 141/81 (101) 95 NonRebreather Mask  


 


7/24/18 06:23 99.8 86 24  90 Bag Valve Mask  








 Vital Signs








  Date Time  Temp Pulse Resp B/P (MAP) Pulse Ox O2 Delivery O2 Flow Rate FiO2


 


7/25/18 15:33 108.0 60 20 108/53 (71) 97 Nasal Cannula 2.0 








I&O











Intake and Output 


 


 7/25/18





 06:59


 


Intake Total 1701.56 ml


 


Output Total 1100 ml


 


Balance 601.56 ml


 


 


 


Intake Oral 0 ml


 


IV Total 1701.56 ml


 


Output Urine Total 1100 ml


 


# Voids 2








Labs:





 Laboratory Tests








Test


 7/24/18


18:10 7/25/18


06:23


 


Nasal Screen MRSA (PCR)


 Negative


(Negative) 





 


White Blood Count


 


 11.1 x10^3/uL


(4.0-11.0)  H


 


Red Blood Count


 


 3.54 x10^6/uL


(4.30-5.70)  L


 


Hemoglobin


 


 10.2 g/dL


(13.0-17.5)  L


 


Hematocrit


 


 31.4 %


(39.0-53.0)  L


 


Mean Corpuscular Volume


 


 89 fL ()





 


Mean Corpuscular Hemoglobin  29 pg (25-35)  


 


Mean Corpuscular Hemoglobin


Concent 


 32 g/dL


(31-37)


 


Red Cell Distribution Width


 


 14.8 %


(11.5-14.5)  H


 


Platelet Count


 


 210 x10^3/uL


(140-400)


 


Neutrophils (%) (Auto)  73 % (31-73)  


 


Lymphocytes (%) (Auto)  16 % (24-48)  L


 


Monocytes (%) (Auto)  11 % (0-9)  H


 


Eosinophils (%) (Auto)  0 % (0-3)  


 


Basophils (%) (Auto)  0 % (0-3)  


 


Neutrophils # (Auto)


 


 8.1 x10^3uL


(1.8-7.7)  H


 


Lymphocytes # (Auto)


 


 1.8 x10^3/uL


(1.0-4.8)


 


Monocytes # (Auto)


 


 1.2 x10^3/uL


(0.0-1.1)  H


 


Eosinophils # (Auto)


 


 0.0 x10^3/uL


(0.0-0.7)


 


Basophils # (Auto)


 


 0.0 x10^3/uL


(0.0-0.2)


 


Sodium Level


 


 144 mmol/L


(136-145)


 


Potassium Level


 


 3.3 mmol/L


(3.5-5.1)  L


 


Chloride Level


 


 112 mmol/L


()  H


 


Carbon Dioxide Level


 


 23 mmol/L


(21-32)


 


Anion Gap  9 (6-14)  


 


Blood Urea Nitrogen


 


 28 mg/dL


(8-26)  H


 


Creatinine


 


 1.0 mg/dL


(0.7-1.3)


 


Estimated GFR


(Cockcroft-Gault) 


 73.2  





 


BUN/Creatinine Ratio  28 (6-20)  H


 


Glucose Level


 


 105 mg/dL


(70-99)  H


 


Calcium Level


 


 8.7 mg/dL


(8.5-10.1)


 


Total Bilirubin


 


 0.3 mg/dL


(0.2-1.0)


 


Aspartate Amino Transferase


(AST) 


 11 U/L (15-37)


L


 


Alanine Aminotransferase (ALT)


 


 21 U/L (16-63)





 


Alkaline Phosphatase


 


 65 U/L


()


 


Total Protein


 


 6.5 g/dL


(6.4-8.2)


 


Albumin


 


 2.0 g/dL


(3.4-5.0)  L


 


Albumin/Globulin Ratio


 


 0.4 (1.0-1.7)


L











Current Medications:


Meds:





Current Medications


Sodium Chloride 1,000 ml @  1,000 mls/hr Q1H IV  Last administered on 7/24/18at 

07:33;  Start 7/24/18 at 07:00;  Stop 7/24/18 at 07:59;  Status DC


Albuterol/ Ipratropium (Duoneb) 3 ml 1X  ONCE NEB  Last administered on 7/24/ 18at 06:51;  Start 7/24/18 at 07:00;  Stop 7/24/18 at 07:01;  Status DC


Methylprednisolone Sodium Succinate (SOLU-Medrol 125MG VIAL) 125 mg 1X  ONCE IV

  Last administered on 7/24/18at 06:51;  Start 7/24/18 at 07:00;  Stop 7/24/18 

at 07:01;  Status DC


Levofloxacin/ Dextrose 150 ml @  150 mls/hr 1X  ONCE IV  Last administered on 7/

24/18at 07:31;  Start 7/24/18 at 07:00;  Stop 7/24/18 at 07:59;  Status DC


Vancomycin HCl 1 gm/Sodium Chloride 250 ml @  250 mls/hr 1X  ONCE IV ;  Start 7/ 24/18 at 06:45;  Stop 7/24/18 at 07:44;  Status UNV


Vancomycin HCl 1.5 gm/Sodium Chloride 500 ml @  250 mls/hr 1X  ONCE IV ;  Start 

7/24/18 at 07:00;  Stop 7/24/18 at 08:59;  Status Cancel


Vancomycin HCl (Vanco Per Pharmacy) 1 each PRN DAILY  PRN MC SEE COMMENTS Last 

administered on 7/24/18at 14:44;  Start 7/24/18 at 07:00


Vancomycin HCl 1.5 gm/Sodium Chloride 500 ml @  250 mls/hr 1X  ONCE IV ;  Start 

7/24/18 at 07:15;  Stop 7/24/18 at 07:15;  Status DC


Vancomycin HCl 1.5 gm/Sodium Chloride 500 ml @  250 mls/hr 1X  ONCE IV  Last 

administered on 7/24/18at 15:07;  Start 7/24/18 at 07:15;  Stop 7/24/18 at 09:14

;  Status DC


Sodium Chloride 1,000 ml @  150 mls/hr Q6H40M IV  Last administered on 7/25/ 18at 05:10;  Start 7/24/18 at 07:09;  Stop 7/25/18 at 07:08;  Status DC


Albuterol/ Ipratropium (Duoneb) 3 ml RTQID NEB  Last administered on 7/24/18at 

20:33;  Start 7/24/18 at 08:00;  Stop 7/25/18 at 07:59;  Status DC


Iohexol (Omnipaque 300 Mg/ml) 75 ml 1X  ONCE IV ;  Start 7/24/18 at 08:00;  

Stop 7/24/18 at 08:01;  Status DC


Meropenem 500 mg/ Sodium Chloride 50 ml @  100 mls/hr Q8HRS IV  Last 

administered on 7/25/18at 12:58;  Start 7/24/18 at 14:00


Acetaminophen (Tylenol) 650 mg PRN Q6HRS  PRN PO PAIN / TEMP;  Start 7/24/18 at 

13:45


Vitamin D (Vitamin D3) 50,000 unit WEEKLY PO ;  Start 7/31/18 at 09:00


Gabapentin (Neurontin) 100 mg TID PO  Last administered on 7/25/18at 14:13;  

Start 7/24/18 at 14:00


Al Hydroxide/Mg Hydroxide (Mylanta Plus Xs) 30 ml PRN Q2HR  PRN PO DYSPEPSIA;  

Start 7/24/18 at 13:45


Magnesium Hydroxide (Milk Of Magnesia) 2,400 mg PRN QHS  PRN PO CONSTIPATION;  

Start 7/24/18 at 13:45


Multi-Ingredient Ointment (Analgesic Balm) 1 bhavna PRN QID  PRN TP muscle pain;  

Start 7/24/18 at 13:45


Mirtazapine (Remeron) 15 mg QHS PO  Last administered on 7/24/18at 21:00;  

Start 7/24/18 at 21:00


Multivitamins/ Calcium (Thera-M Plus) 1 tab DAILY PO  Last administered on 7/25/ 18at 11:08;  Start 7/25/18 at 09:00


Neomycin/ Polymyxin/ Bacitracin (Triple Antibiotic Ointment) 1 pkt PRN DAILY  

PRN TP Wound Healing;  Start 7/24/18 at 13:45


Olanzapine (ZyPREXA ZYDIS) 5 mg PRN Q2HR  PRN PO PSYCHOSIS;  Start 7/24/18 at 13

:45


Ondansetron HCl (Zofran Odt) 4 mg PRN Q8HRS  PRN PO NAUSEA/VOMITING;  Start 7/24 /18 at 13:45


Simethicone (Gas-X) 80 mg PRN AFTMEALHC  PRN PO GAS / BLOATING;  Start 7/24/18 

at 13:45


Ascorbic Acid (Vitamin C) 500 mg DAILY PO  Last administered on 7/25/18at 11:07

;  Start 7/25/18 at 09:00


Baclofen (Lioresal) 10 mg BID PO  Last administered on 7/25/18at 11:08;  Start 7 /24/18 at 21:00


Bisacodyl (Dulcolax Supp) 10 mg PRN DAILY  PRN RC CONSTIPATION;  Start 7/24/18 

at 13:45


Cetirizine HCl (ZyrTEC) 10 mg DAILY PO  Last administered on 7/25/18at 11:07;  

Start 7/25/18 at 09:00


Docusate Sodium (Colace) 100 mg PRN BID  PRN PO CONSTIPATION;  Start 7/24/18 at 

14:15


Famotidine (Pepcid) 20 mg BID PO  Last administered on 7/25/18at 11:07;  Start 7 /24/18 at 21:00


Finasteride (Proscar) 5 mg DAILY PO  Last administered on 7/25/18at 11:15;  

Start 7/25/18 at 09:00


Lactobacillus Rhamnosus (Culturelle) 1 cap BID PO  Last administered on 7/25/ 18at 11:07;  Start 7/24/18 at 21:00


Levothyroxine Sodium (Synthroid) 50 mcg DAILY07 PO  Last administered on 7/25/ 18at 11:08;  Start 7/25/18 at 07:00


Lithium Carbonate 150 mg DAILY PO  Last administered on 7/25/18at 11:08;  Start 

7/25/18 at 09:00


Lithium Carbonate 300 mg QHS PO ;  Start 7/24/18 at 21:00


Lorazepam (Ativan) 0.5 mg TID PRN  PRN PO ANXIETY / AGITATION;  Start 7/24/18 

at 13:45


Oxycodone HCl (Roxicodone) 5 mg PRN Q6HRS  PRN PO PAIN Last administered on 7/25 /18at 14:13;  Start 7/24/18 at 13:45


Polyethylene Glycol (miraLAX) 17 gm BID PO ;  Start 7/25/18 at 21:00


Risperidone (RisperDAL) 1.5 mg QHS PO ;  Start 7/24/18 at 21:00


Senna/Docusate Sodium (Senna Plus) 1 tab BID PO  Last administered on 7/25/18at 

11:07;  Start 7/24/18 at 21:00


Terazosin HCl (Hytrin) 10 mg QHS PO ;  Start 7/24/18 at 21:00


Trazodone HCl (Desyrel) 50 mg PRN QHS  PRN PO INSOMNIA, MAY REPEAT X1;  Start 7/ 24/18 at 13:45


Fentanyl Citrate (Fentanyl 2ml Vial) 50 mcg PRN Q3HRS  PRN IV PAIN Last 

administered on 7/25/18at 12:57;  Start 7/24/18 at 14:15


Vancomycin HCl 1 gm/Sodium Chloride 250 ml @  250 mls/hr Q24H IV  Last 

administered on 7/25/18at 14:15;  Start 7/25/18 at 15:00


Vancomycin HCl (Vancomycin Trough Level) 1 each 1X  ONCE MC ;  Start 7/26/18 at 

14:30;  Stop 7/26/18 at 14:31


Albuterol/ Ipratropium (Duoneb) 3 ml RTQID NEB  Last administered on 7/25/18at 

15:36;  Start 7/25/18 at 08:00





Active Scripts


Active


Reported


D3-50 (Cholecalciferol (Vitamin D3)) 50,000 Unit Capsule 1 Cap PO WEEKLY


Analgesic Balm (Methyl Salicylate/Menthol) 28 Gm Oint...g. 1 Bhavna TP PRN QID PRN


Levothyroxine Sodium 50 Mcg Tablet 50 Mcg PO DAILY06


Gabapentin 100 Mg Capsule 100 Mg PO TID


Zofran Odt (Ondansetron) 4 Mg Tab.rapdis 4 Mg PO PRN Q8HRS PRN


Simethicone 80 Mg Tab.chew 80 Mg PO PRN AFTMEALHC PRN


Thera M Plus Tablet (Multivits,Ca,Minerals/Iron/FA) 1 Each Tablet 1 Tab PO DAILY


Mirtazapine 7.5 Mg Tablet 15 Mg PO QHS


Milk Of Magnesia (Magnesium Hydroxide) 400 Mg/5 Ml Oral.susp 2,400 Mg PO PRN 

QHS PRN


Culturelle (Lactobacillus Rhamnosus Gg) 1 Each Capsule 1 Each PO BID


Zyrtec (Cetirizine Hcl) 10 Mg Tablet 10 Mg PO DAILY


Vitamin C (Ascorbate Calcium) 500 Mg Tablet 500 Mg PO DAILY


Trazodone Hcl 50 Mg Tablet 50 Mg PO PRN QHS PRN


Risperdal (Risperidone) 1 Mg Tablet 1.5 Mg PO QHS


Zyprexa Zydis (Olanzapine) 5 Mg Tab.rapdis 5 Mg PO PRN Q2HR PRN


Lithium Carbonate 150 Mg Capsule 150 Mg PO DAILY


Miralax (Polyethylene Glycol 3350) 17 Gm Powd.pack 17 Gm PO BID


Triple Antibiotic Ointment (Neomy Sulf/Bacitra/Polymyxin B) 1 Each Packet 1 

Packet TP PRN DAILY PRN


     apply to finger wound.


Oxycodone Hcl 5 Mg Capsule 5 Mg PO PRN Q6HRS PRN


Lorazepam 0.5 Mg Tablet 0.5 Mg PO TID PRN PRN


Famotidine 20 Mg Tablet 20 Mg PO BID


Terazosin Hcl 10 Mg Capsule 10 Mg PO HS


Lithium Carbonate 300 Mg Capsule 300 Mg PO HS


Baclofen 10 Mg Tablet 10 Mg PO BID


Maalox Maximum Strength Susp (Mag Hydrox/Al Hydrox/Simeth) 355 Ml Oral.susp 30 

Ml PO PRN Q2HR PRN


Finasteride 5 Mg Tablet 5 Mg PO DAILY


Colace (Docusate Sodium) 100 Mg Capsule 100 Mg PO PRN BID PRN


Senokot (Sennosides) 8.6 Mg Tablet 8.6 Mg PO BID


Bisacodyl 10 Mg Supp.rect 10 Mg RC PRN DAILY PRN


Tylenol (Acetaminophen) 325 Mg Tablet 650 Mg PO PRN Q6HRS PRN


I have reviewed the current psychotropics carefully including drug 

interactions.  Risk benefit ratio favors no change other than as noted in my 

dictated progress note.





Diagnosis:


Problems:  


(1) Bipolar affective, mixed, sev w/ psych


(2) Dementia, vascular, with delusions


(3) Mild cognitive impairment


(4) Anxiety disorder


(5) HCAP (healthcare-associated pneumonia)











DIANA STALLINGS MD Jul 25, 2018 17:04

## 2018-07-25 NOTE — NUR
Wound care:

Patient seen per wound care consult. Patient is known to wound care from previous 
admissions. Patient has an an unstageable pressure ulcer on coccyx. Wound cleansed and 
assessed. Recommendations for Hydrocolloid and cover with foam. The wound is slough covered 
and needs some type of debridement. Patient is also incontinent of stool, dressing should 
also help to keep stool out of wound. Patient states wound is very painful. Requesting pain 
medication, RN informed. Patient repositioned and turned to left side using pillow. 
Bilateral heels floated. Patient is on a P-500 bed at this time. Dressing change 
instructions left in room. RN informed of POC and patient re educated on turning. Will 
follow patient regarding wound care.

## 2018-07-25 NOTE — PN
DATE:  07/25/2018



SUBJECTIVE:  The patient is resting, slightly propped up in bed, definitely more

awake, alert, responding appropriately.  Nursing staff stated that he has eaten

some soft food but they are still awaiting the speech therapy to do a bedside

swallowing evaluation if need be a redo swallowing evaluation.  He is afebrile.



PHYSICAL EXAMINATION:

GENERAL:  When I examined him, he looked pale, cachectic, but no jaundice,

cyanosis, or thyromegaly.  No jugular venous distension.  No limb edema.

VITAL SIGNS:  His heart rate was 69, blood pressure 129/69, temperature was 98,

respiratory rate was 20, and oxygen saturation was 97% on 3 liters of oxygen by

nasal cannula.

HEAD, EYES, EARS, NOSE and THROAT:  Showed normocephalic, atraumatic.

NECK:  Supple.

HEART:  Showed normal first and second sounds.  No gallop, rub or murmur.

CHEST:  Clear to auscultation.  No crepitation or rhonchi.

ABDOMEN:  Distended, soft, nontender.

NEUROLOGIC:  He is awake, alert, responding appropriately, although at times

confused.  He has quadriplegia with marked muscle wasting and fixed flexion

contracture.  He has a neurogenic bladder requiring indwelling Gordon

catheterization He has a neurogenic bladder, benign prostatic hypertrophy with

bladder outlet obstruction requiring indwelling Gordon catheter.  He has a large

sacral decubitus ulcer.



His intake over the last 24 hours was 1700, output was 1100.



LABORATORY DATA:  His white cell count is 11,000, hemoglobin 10, hematocrit 31,

MCV 89 and platelet count of 210,000.  His serum sodium was 144, potassium 3.3,

chloride 112, bicarbonate 23, anion gap of 9, BUN 28, creatinine 1, estimated

GFR was 73 mL per minute.  His glucose was 105.  Calcium was 8.7.  Total

bilirubin, AST, ALT, alkaline phosphatase were normal.  Total protein 6.5,

albumin 2.  His nasal screen for MRSA by PCR was negative so far.  His blood

cultures are negative.



ASSESSMENT:

1.  Acute hypoxic respiratory failure.

2.  Healthcare-associated pneumonia.

3.  Urinary tract infection.

4.  Sacral decubitus ulcer.

5.  Quadriplegia with neurogenic bladder requiring indwelling Gordon catheter.

6.  Hypothyroidism.

7.  Benign prostatic hypertrophy.



PLAN:  To continue IV antibiotic in the form of vancomycin, meropenem and

levofloxacin.  Continue with the nutritional support, pain management, wound

care.





______________________________

SAMIR SCOTT MD



DR:  SHAWN/timbo  JOB#:  [id number] / [id number]

DD:  07/25/2018 14:05  DT:  07/25/2018 16:55

## 2018-07-26 VITALS — DIASTOLIC BLOOD PRESSURE: 63 MMHG | SYSTOLIC BLOOD PRESSURE: 118 MMHG

## 2018-07-26 VITALS — DIASTOLIC BLOOD PRESSURE: 55 MMHG | SYSTOLIC BLOOD PRESSURE: 101 MMHG

## 2018-07-26 VITALS — DIASTOLIC BLOOD PRESSURE: 67 MMHG | SYSTOLIC BLOOD PRESSURE: 111 MMHG

## 2018-07-26 VITALS — SYSTOLIC BLOOD PRESSURE: 105 MMHG | DIASTOLIC BLOOD PRESSURE: 62 MMHG

## 2018-07-26 VITALS — DIASTOLIC BLOOD PRESSURE: 61 MMHG | SYSTOLIC BLOOD PRESSURE: 106 MMHG

## 2018-07-26 LAB
ALBUMIN SERPL-MCNC: 1.9 G/DL (ref 3.4–5)
ALBUMIN/GLOB SERPL: 0.5 {RATIO} (ref 1–1.7)
ALP SERPL-CCNC: 62 U/L (ref 46–116)
ALT SERPL-CCNC: 16 U/L (ref 16–63)
ANION GAP SERPL CALC-SCNC: 8 MMOL/L (ref 6–14)
AST SERPL-CCNC: 11 U/L (ref 15–37)
BASOPHILS # BLD AUTO: 0.1 X10^3/UL (ref 0–0.2)
BASOPHILS NFR BLD: 1 % (ref 0–3)
BILIRUB SERPL-MCNC: 0.3 MG/DL (ref 0.2–1)
BUN/CREAT SERPL: 22 (ref 6–20)
CA-I SERPL ISE-MCNC: 22 MG/DL (ref 8–26)
CALCIUM SERPL-MCNC: 8.1 MG/DL (ref 8.5–10.1)
CHLORIDE SERPL-SCNC: 115 MMOL/L (ref 98–107)
CO2 SERPL-SCNC: 23 MMOL/L (ref 21–32)
CREAT SERPL-MCNC: 1 MG/DL (ref 0.7–1.3)
CRP SERPL-MCNC: 22.7 MG/L (ref 0–3.3)
EOSINOPHIL NFR BLD: 0.1 X10^3/UL (ref 0–0.7)
EOSINOPHIL NFR BLD: 1 % (ref 0–3)
ERYTHROCYTE [DISTWIDTH] IN BLOOD BY AUTOMATED COUNT: 15.1 % (ref 11.5–14.5)
ERYTHROCYTE [SEDIMENTATION RATE] IN BLOOD: 20 MM/H (ref 0–15)
GFR SERPLBLD BASED ON 1.73 SQ M-ARVRAT: 73.2 ML/MIN
GLOBULIN SER-MCNC: 4.1 G/DL (ref 2.2–3.8)
GLUCOSE SERPL-MCNC: 110 MG/DL (ref 70–99)
HCT VFR BLD CALC: 30.9 % (ref 39–53)
HGB BLD-MCNC: 9.9 G/DL (ref 13–17.5)
LYMPHOCYTES # BLD: 3.2 X10^3/UL (ref 1–4.8)
LYMPHOCYTES NFR BLD AUTO: 40 % (ref 24–48)
MCH RBC QN AUTO: 29 PG (ref 25–35)
MCHC RBC AUTO-ENTMCNC: 32 G/DL (ref 31–37)
MCV RBC AUTO: 91 FL (ref 79–100)
MONO #: 0.6 X10^3/UL (ref 0–1.1)
MONOCYTES NFR BLD: 7 % (ref 0–9)
NEUT #: 4.1 X10^3UL (ref 1.8–7.7)
NEUTROPHILS NFR BLD AUTO: 51 % (ref 31–73)
PLATELET # BLD AUTO: 178 X10^3/UL (ref 140–400)
POTASSIUM SERPL-SCNC: 3.4 MMOL/L (ref 3.5–5.1)
PROT SERPL-MCNC: 6 G/DL (ref 6.4–8.2)
RBC # BLD AUTO: 3.41 X10^6/UL (ref 4.3–5.7)
SODIUM SERPL-SCNC: 146 MMOL/L (ref 136–145)
VANC TR: 11.3 MCG/ML (ref 10–20)
WBC # BLD AUTO: 8 X10^3/UL (ref 4–11)

## 2018-07-26 RX ADMIN — GABAPENTIN SCH MG: 100 CAPSULE ORAL at 14:48

## 2018-07-26 RX ADMIN — VANCOMYCIN HYDROCHLORIDE PRN EACH: 1 INJECTION, POWDER, LYOPHILIZED, FOR SOLUTION INTRAVENOUS at 16:16

## 2018-07-26 RX ADMIN — BACLOFEN SCH MG: 10 TABLET ORAL at 21:33

## 2018-07-26 RX ADMIN — DEXTROSE AND POTASSIUM CHLORIDE SCH MLS/HR: 5; .3 SOLUTION INTRAVENOUS at 13:50

## 2018-07-26 RX ADMIN — IPRATROPIUM BROMIDE AND ALBUTEROL SULFATE SCH ML: .5; 3 SOLUTION RESPIRATORY (INHALATION) at 05:28

## 2018-07-26 RX ADMIN — MULTIPLE VITAMINS W/ MINERALS TAB SCH TAB: TAB at 09:09

## 2018-07-26 RX ADMIN — LITHIUM CARBONATE SCH MG: 300 TABLET ORAL at 09:11

## 2018-07-26 RX ADMIN — OXYCODONE HYDROCHLORIDE AND ACETAMINOPHEN SCH MG: 500 TABLET ORAL at 09:09

## 2018-07-26 RX ADMIN — POLYETHYLENE GLYCOL 3350 SCH GM: 17 POWDER, FOR SOLUTION ORAL at 21:00

## 2018-07-26 RX ADMIN — MIRTAZAPINE SCH MG: 15 TABLET, FILM COATED ORAL at 21:35

## 2018-07-26 RX ADMIN — SENNOSIDES AND DOCUSATE SODIUM SCH TAB: 8.6; 5 TABLET ORAL at 21:00

## 2018-07-26 RX ADMIN — IPRATROPIUM BROMIDE AND ALBUTEROL SULFATE SCH ML: .5; 3 SOLUTION RESPIRATORY (INHALATION) at 15:05

## 2018-07-26 RX ADMIN — MEROPENEM SCH MLS/HR: 500 INJECTION, POWDER, FOR SOLUTION INTRAVENOUS at 23:03

## 2018-07-26 RX ADMIN — LITHIUM CARBONATE SCH MG: 300 TABLET ORAL at 21:32

## 2018-07-26 RX ADMIN — Medication SCH CAP: at 21:32

## 2018-07-26 RX ADMIN — CETIRIZINE HYDROCHLORIDE SCH MG: 10 TABLET, FILM COATED ORAL at 09:09

## 2018-07-26 RX ADMIN — LEVOTHYROXINE SODIUM SCH MCG: 50 TABLET ORAL at 05:29

## 2018-07-26 RX ADMIN — POLYETHYLENE GLYCOL 3350 SCH GM: 17 POWDER, FOR SOLUTION ORAL at 09:00

## 2018-07-26 RX ADMIN — GABAPENTIN SCH MG: 100 CAPSULE ORAL at 21:35

## 2018-07-26 RX ADMIN — VANCOMYCIN HYDROCHLORIDE SCH MLS/HR: 1 INJECTION, POWDER, LYOPHILIZED, FOR SOLUTION INTRAVENOUS at 15:19

## 2018-07-26 RX ADMIN — MEROPENEM SCH MLS/HR: 500 INJECTION, POWDER, FOR SOLUTION INTRAVENOUS at 14:22

## 2018-07-26 RX ADMIN — TERAZOSIN HYDROCHLORIDE SCH MG: 5 CAPSULE ORAL at 21:33

## 2018-07-26 RX ADMIN — FAMOTIDINE SCH MG: 20 TABLET ORAL at 09:09

## 2018-07-26 RX ADMIN — RISPERIDONE SCH MG: 1 TABLET, FILM COATED ORAL at 21:34

## 2018-07-26 RX ADMIN — SENNOSIDES AND DOCUSATE SODIUM SCH TAB: 8.6; 5 TABLET ORAL at 09:00

## 2018-07-26 RX ADMIN — FAMOTIDINE SCH MG: 20 TABLET ORAL at 21:35

## 2018-07-26 RX ADMIN — IPRATROPIUM BROMIDE AND ALBUTEROL SULFATE SCH ML: .5; 3 SOLUTION RESPIRATORY (INHALATION) at 20:00

## 2018-07-26 RX ADMIN — FINASTERIDE SCH MG: 5 TABLET, FILM COATED ORAL at 09:08

## 2018-07-26 RX ADMIN — IPRATROPIUM BROMIDE AND ALBUTEROL SULFATE SCH ML: .5; 3 SOLUTION RESPIRATORY (INHALATION) at 09:23

## 2018-07-26 RX ADMIN — Medication SCH CAP: at 09:10

## 2018-07-26 RX ADMIN — MEROPENEM SCH MLS/HR: 500 INJECTION, POWDER, FOR SOLUTION INTRAVENOUS at 05:35

## 2018-07-26 RX ADMIN — GABAPENTIN SCH MG: 100 CAPSULE ORAL at 09:10

## 2018-07-26 RX ADMIN — BACLOFEN SCH MG: 10 TABLET ORAL at 09:09

## 2018-07-26 RX ADMIN — SODIUM CHLORIDE SCH MLS/HR: 0.9 INJECTION, SOLUTION INTRAVENOUS at 06:09

## 2018-07-26 NOTE — NUR
Pharmacy Vancomycin Dosing Note



S:Consulted to monitor and dose vancomycin started 07/24/18.



O:DILCIA STEINBERG is a 72 year old M with 

Sepsis

HCAP .



Height: 5 feet, 9 inches

Weight: 66.054078 kg

Ideal Body Weight: 70.70 

Adjusted Body Weight: 68.98 

Dosing Weight: Actual



Other Antibiotics: MERREM



LABS:

Last BUN: 22 

Last Creatinine: 1.0 

Creatinine Clearance: 61.8 

Last WBC: 8.0 

Last Platelets: 178  





Vancomycin Dosing:

Loading Dose: 1500 mg x1

Dosing Weight: Actual

Target Trough: 15-20





A: Based on today's trough of 11.3, we will increase the frequency to q12hrs.





P: 1. Increase Vancomycin to 1000 mg IV q12h

   2. Follow up Trough level on 07/28/18 at 1430 

   3. Pharmacy will continue to monitor, follow and adjust therapy as needed.

 

 LIV AKINS RPH  07/26/18 [id number]

## 2018-07-26 NOTE — PDOC
Exam


Note:


Shai Note:


Please also refer to the separate dictated note~for this date of service 

dictated separately.~Patient seen individually. Discussed the patient with 

Nursing staff reviewed the chart.~Reviewed interim history and current 

functioning. Reviewed vital signs,~Labs/ Radiology~and current medications 

noted below. Continue current treatment with the changes noted in the dictated 

addendum note





Assessment:


Vital Signs:





 Vital Signs








  Date Time  Temp Pulse Resp B/P (MAP) Pulse Ox O2 Delivery O2 Flow Rate FiO2


 


7/26/18 15:07     97 Nasal Cannula 2.0 


 


7/26/18 14:52 98.2 63 20 111/67 (82)    








I&O











Intake and Output 


 


 7/26/18





 07:00


 


Intake Total 1960 ml


 


Output Total 1375 ml


 


Balance 585 ml


 


 


 


Intake Oral 660 ml


 


IV Total 1300 ml


 


Output Urine Total 1375 ml


 


# Bowel Movements 1








Labs:





 Laboratory Tests








Test


 7/26/18


06:30 7/26/18


14:28


 


White Blood Count


 8.0 x10^3/uL


(4.0-11.0) 





 


Red Blood Count


 3.41 x10^6/uL


(4.30-5.70)  L 





 


Hemoglobin


 9.9 g/dL


(13.0-17.5)  L 





 


Hematocrit


 30.9 %


(39.0-53.0)  L 





 


Mean Corpuscular Volume


 91 fL ()


 





 


Mean Corpuscular Hemoglobin 29 pg (25-35)   


 


Mean Corpuscular Hemoglobin


Concent 32 g/dL


(31-37) 





 


Red Cell Distribution Width


 15.1 %


(11.5-14.5)  H 





 


Platelet Count


 178 x10^3/uL


(140-400) 





 


Neutrophils (%) (Auto) 51 % (31-73)   


 


Lymphocytes (%) (Auto) 40 % (24-48)   


 


Monocytes (%) (Auto) 7 % (0-9)   


 


Eosinophils (%) (Auto) 1 % (0-3)   


 


Basophils (%) (Auto) 1 % (0-3)   


 


Neutrophils # (Auto)


 4.1 x10^3uL


(1.8-7.7) 





 


Lymphocytes # (Auto)


 3.2 x10^3/uL


(1.0-4.8) 





 


Monocytes # (Auto)


 0.6 x10^3/uL


(0.0-1.1) 





 


Eosinophils # (Auto)


 0.1 x10^3/uL


(0.0-0.7) 





 


Basophils # (Auto)


 0.1 x10^3/uL


(0.0-0.2) 





 


Erythrocyte Sedimentation Rate 20 (0-15)  H 


 


Sodium Level


 146 mmol/L


(136-145)  H 





 


Potassium Level


 3.4 mmol/L


(3.5-5.1)  L 





 


Chloride Level


 115 mmol/L


()  H 





 


Carbon Dioxide Level


 23 mmol/L


(21-32) 





 


Anion Gap 8 (6-14)   


 


Blood Urea Nitrogen


 22 mg/dL


(8-26) 





 


Creatinine


 1.0 mg/dL


(0.7-1.3) 





 


Estimated GFR


(Cockcroft-Gault) 73.2  


 





 


BUN/Creatinine Ratio 22 (6-20)  H 


 


Glucose Level


 110 mg/dL


(70-99)  H 





 


Calcium Level


 8.1 mg/dL


(8.5-10.1)  L 





 


Total Bilirubin


 0.3 mg/dL


(0.2-1.0) 





 


Aspartate Amino Transferase


(AST) 11 U/L (15-37)


L 





 


Alanine Aminotransferase (ALT)


 16 U/L (16-63)


 





 


Alkaline Phosphatase


 62 U/L


() 





 


C-Reactive Protein


 22.7 mg/L


(0-3.3)  H 





 


Total Protein


 6.0 g/dL


(6.4-8.2)  L 





 


Albumin


 1.9 g/dL


(3.4-5.0)  L 





 


Albumin/Globulin Ratio


 0.5 (1.0-1.7)


L 





 


Lithium Level


 0.5 mmol/L


(0.6-1.2)  L 





 


Lithium Last Dose Date 7/25/18   


 


Lithium Last Dose Time 2100   


 


Vancomycin Level Trough


 


 11.3 mcg/mL


(10.0-20.0)


 


Vancomycin Last Dose Date  07/25/18  


 


Vancomycin Last Dose Time  1500  











Current Medications:


Meds:





Current Medications


Sodium Chloride 1,000 ml @  1,000 mls/hr Q1H IV  Last administered on 7/24/18at 

07:33;  Start 7/24/18 at 07:00;  Stop 7/24/18 at 07:59;  Status DC


Albuterol/ Ipratropium (Duoneb) 3 ml 1X  ONCE NEB  Last administered on 7/24/ 18at 06:51;  Start 7/24/18 at 07:00;  Stop 7/24/18 at 07:01;  Status DC


Methylprednisolone Sodium Succinate (SOLU-Medrol 125MG VIAL) 125 mg 1X  ONCE IV

  Last administered on 7/24/18at 06:51;  Start 7/24/18 at 07:00;  Stop 7/24/18 

at 07:01;  Status DC


Levofloxacin/ Dextrose 150 ml @  150 mls/hr 1X  ONCE IV  Last administered on 7/ 24/18at 07:31;  Start 7/24/18 at 07:00;  Stop 7/24/18 at 07:59;  Status DC


Vancomycin HCl 1 gm/Sodium Chloride 250 ml @  250 mls/hr 1X  ONCE IV ;  Start 7/ 24/18 at 06:45;  Stop 7/24/18 at 07:44;  Status UNV


Vancomycin HCl 1.5 gm/Sodium Chloride 500 ml @  250 mls/hr 1X  ONCE IV ;  Start 

7/24/18 at 07:00;  Stop 7/24/18 at 08:59;  Status Cancel


Vancomycin HCl (Vanco Per Pharmacy) 1 each PRN DAILY  PRN MC SEE COMMENTS Last 

administered on 7/26/18at 16:16;  Start 7/24/18 at 07:00


Vancomycin HCl 1.5 gm/Sodium Chloride 500 ml @  250 mls/hr 1X  ONCE IV ;  Start 

7/24/18 at 07:15;  Stop 7/24/18 at 07:15;  Status DC


Vancomycin HCl 1.5 gm/Sodium Chloride 500 ml @  250 mls/hr 1X  ONCE IV  Last 

administered on 7/24/18at 15:07;  Start 7/24/18 at 07:15;  Stop 7/24/18 at 09:14

;  Status DC


Sodium Chloride 1,000 ml @  150 mls/hr Q6H40M IV  Last administered on 7/25/ 18at 05:10;  Start 7/24/18 at 07:09;  Stop 7/25/18 at 07:08;  Status DC


Albuterol/ Ipratropium (Duoneb) 3 ml RTQID NEB  Last administered on 7/24/18at 

20:33;  Start 7/24/18 at 08:00;  Stop 7/25/18 at 07:59;  Status DC


Iohexol (Omnipaque 300 Mg/ml) 75 ml 1X  ONCE IV ;  Start 7/24/18 at 08:00;  

Stop 7/24/18 at 08:01;  Status DC


Meropenem 500 mg/ Sodium Chloride 50 ml @  100 mls/hr Q8HRS IV  Last 

administered on 7/26/18at 14:22;  Start 7/24/18 at 14:00


Acetaminophen (Tylenol) 650 mg PRN Q6HRS  PRN PO PAIN / TEMP;  Start 7/24/18 at 

13:45


Vitamin D (Vitamin D3) 50,000 unit WEEKLY PO ;  Start 7/31/18 at 09:00


Gabapentin (Neurontin) 100 mg TID PO  Last administered on 7/26/18at 14:48;  

Start 7/24/18 at 14:00


Al Hydroxide/Mg Hydroxide (Mylanta Plus Xs) 30 ml PRN Q2HR  PRN PO DYSPEPSIA;  

Start 7/24/18 at 13:45


Magnesium Hydroxide (Milk Of Magnesia) 2,400 mg PRN QHS  PRN PO CONSTIPATION;  

Start 7/24/18 at 13:45


Multi-Ingredient Ointment (Analgesic Balm) 1 bhavna PRN QID  PRN TP muscle pain;  

Start 7/24/18 at 13:45


Mirtazapine (Remeron) 15 mg QHS PO  Last administered on 7/25/18at 21:27;  

Start 7/24/18 at 21:00


Multivitamins/ Calcium (Thera-M Plus) 1 tab DAILY PO  Last administered on 7/26/ 18at 09:09;  Start 7/25/18 at 09:00


Neomycin/ Polymyxin/ Bacitracin (Triple Antibiotic Ointment) 1 pkt PRN DAILY  

PRN TP Wound Healing;  Start 7/24/18 at 13:45


Olanzapine (ZyPREXA ZYDIS) 5 mg PRN Q2HR  PRN PO PSYCHOSIS;  Start 7/24/18 at 13

:45


Ondansetron HCl (Zofran Odt) 4 mg PRN Q8HRS  PRN PO NAUSEA/VOMITING;  Start 7/24 /18 at 13:45


Simethicone (Gas-X) 80 mg PRN AFTMEALHC  PRN PO GAS / BLOATING;  Start 7/24/18 

at 13:45


Ascorbic Acid (Vitamin C) 500 mg DAILY PO  Last administered on 7/26/18at 09:09

;  Start 7/25/18 at 09:00


Baclofen (Lioresal) 10 mg BID PO  Last administered on 7/26/18at 09:09;  Start 7 /24/18 at 21:00


Bisacodyl (Dulcolax Supp) 10 mg PRN DAILY  PRN RC CONSTIPATION;  Start 7/24/18 

at 13:45


Cetirizine HCl (ZyrTEC) 10 mg DAILY PO  Last administered on 7/26/18at 09:09;  

Start 7/25/18 at 09:00


Docusate Sodium (Colace) 100 mg PRN BID  PRN PO CONSTIPATION;  Start 7/24/18 at 

14:15


Famotidine (Pepcid) 20 mg BID PO  Last administered on 7/26/18at 09:09;  Start 7 /24/18 at 21:00


Finasteride (Proscar) 5 mg DAILY PO  Last administered on 7/26/18at 09:08;  

Start 7/25/18 at 09:00


Lactobacillus Rhamnosus (Culturelle) 1 cap BID PO  Last administered on 7/26/ 18at 09:10;  Start 7/24/18 at 21:00


Levothyroxine Sodium (Synthroid) 50 mcg DAILY07 PO  Last administered on 7/26/ 18at 05:29;  Start 7/25/18 at 07:00


Lithium Carbonate 150 mg DAILY PO  Last administered on 7/26/18at 09:11;  Start 

7/25/18 at 09:00


Lithium Carbonate 300 mg QHS PO  Last administered on 7/25/18at 21:29;  Start 7/ 24/18 at 21:00


Lorazepam (Ativan) 0.5 mg TID PRN  PRN PO ANXIETY / AGITATION Last administered 

on 7/26/18at 16:55;  Start 7/24/18 at 13:45


Oxycodone HCl (Roxicodone) 5 mg PRN Q6HRS  PRN PO PAIN Last administered on 7/26 /18at 16:55;  Start 7/24/18 at 13:45


Polyethylene Glycol (miraLAX) 17 gm BID PO ;  Start 7/25/18 at 21:00


Risperidone (RisperDAL) 1.5 mg QHS PO  Last administered on 7/25/18at 21:29;  

Start 7/24/18 at 21:00


Senna/Docusate Sodium (Senna Plus) 1 tab BID PO  Last administered on 7/25/18at 

11:07;  Start 7/24/18 at 21:00


Terazosin HCl (Hytrin) 10 mg QHS PO  Last administered on 7/25/18at 21:29;  

Start 7/24/18 at 21:00


Trazodone HCl (Desyrel) 50 mg PRN QHS  PRN PO INSOMNIA, MAY REPEAT X1;  Start 7/ 24/18 at 13:45


Fentanyl Citrate (Fentanyl 2ml Vial) 50 mcg PRN Q3HRS  PRN IV PAIN Last 

administered on 7/26/18at 08:55;  Start 7/24/18 at 14:15


Vancomycin HCl 1 gm/Sodium Chloride 250 ml @  250 mls/hr Q24H IV  Last 

administered on 7/26/18at 15:19;  Start 7/25/18 at 15:00;  Stop 7/26/18 at 16:12

;  Status DC


Vancomycin HCl (Vancomycin Trough Level) 1 each 1X  ONCE MC  Last administered 

on 7/26/18at 14:30;  Start 7/26/18 at 14:30;  Stop 7/26/18 at 14:31;  Status DC


Albuterol/ Ipratropium (Duoneb) 3 ml RTQID NEB  Last administered on 7/26/18at 

15:05;  Start 7/25/18 at 08:00


Sodium Chloride 1,000 ml @  150 mls/hr Q6H40M IV  Last administered on 7/26/ 18at 06:09;  Start 7/25/18 at 23:15;  Stop 7/26/18 at 12:50;  Status DC


Potassium Chloride/Dextrose 1,000 ml @  75 mls/hr K47Q20H IV  Last administered 

on 7/26/18at 13:50;  Start 7/26/18 at 13:00


Vancomycin HCl 1 gm/Sodium Chloride 250 ml @  250 mls/hr Q12H IV ;  Start 7/27/ 18 at 03:00


Vancomycin HCl (Vancomycin Trough Level) 1 each 1X  ONCE MC ;  Start 7/28/18 at 

14:30;  Stop 7/28/18 at 14:31





Active Scripts


Active


Reported


D3-50 (Cholecalciferol (Vitamin D3)) 50,000 Unit Capsule 1 Cap PO WEEKLY


Analgesic Balm (Methyl Salicylate/Menthol) 28 Gm Oint...g. 1 Bhavna TP PRN QID PRN


Levothyroxine Sodium 50 Mcg Tablet 50 Mcg PO DAILY06


Gabapentin 100 Mg Capsule 100 Mg PO TID


Zofran Odt (Ondansetron) 4 Mg Tab.rapdis 4 Mg PO PRN Q8HRS PRN


Simethicone 80 Mg Tab.chew 80 Mg PO PRN AFTMEALHC PRN


Thera M Plus Tablet (Multivits,Ca,Minerals/Iron/FA) 1 Each Tablet 1 Tab PO DAILY


Mirtazapine 7.5 Mg Tablet 15 Mg PO QHS


Milk Of Magnesia (Magnesium Hydroxide) 400 Mg/5 Ml Oral.susp 2,400 Mg PO PRN 

QHS PRN


Culturelle (Lactobacillus Rhamnosus Gg) 1 Each Capsule 1 Each PO BID


Zyrtec (Cetirizine Hcl) 10 Mg Tablet 10 Mg PO DAILY


Vitamin C (Ascorbate Calcium) 500 Mg Tablet 500 Mg PO DAILY


Trazodone Hcl 50 Mg Tablet 50 Mg PO PRN QHS PRN


Risperdal (Risperidone) 1 Mg Tablet 1.5 Mg PO QHS


Zyprexa Zydis (Olanzapine) 5 Mg Tab.rapdis 5 Mg PO PRN Q2HR PRN


Lithium Carbonate 150 Mg Capsule 150 Mg PO DAILY


Miralax (Polyethylene Glycol 3350) 17 Gm Powd.pack 17 Gm PO BID


Triple Antibiotic Ointment (Neomy Sulf/Bacitra/Polymyxin B) 1 Each Packet 1 

Packet TP PRN DAILY PRN


     apply to finger wound.


Oxycodone Hcl 5 Mg Capsule 5 Mg PO PRN Q6HRS PRN


Lorazepam 0.5 Mg Tablet 0.5 Mg PO TID PRN PRN


Famotidine 20 Mg Tablet 20 Mg PO BID


Terazosin Hcl 10 Mg Capsule 10 Mg PO HS


Lithium Carbonate 300 Mg Capsule 300 Mg PO HS


Baclofen 10 Mg Tablet 10 Mg PO BID


Maalox Maximum Strength Susp (Mag Hydrox/Al Hydrox/Simeth) 355 Ml Oral.susp 30 

Ml PO PRN Q2HR PRN


Finasteride 5 Mg Tablet 5 Mg PO DAILY


Colace (Docusate Sodium) 100 Mg Capsule 100 Mg PO PRN BID PRN


Senokot (Sennosides) 8.6 Mg Tablet 8.6 Mg PO BID


Bisacodyl 10 Mg Supp.rect 10 Mg RC PRN DAILY PRN


Tylenol (Acetaminophen) 325 Mg Tablet 650 Mg PO PRN Q6HRS PRN


I have reviewed the current psychotropics carefully including drug 

interactions.  Risk benefit ratio favors no change other than as noted in my 

dictated progress note.





Diagnosis:


Problems:  


(1) HCAP (healthcare-associated pneumonia)


(2) Bipolar affective, mixed, sev w/ psych


(3) Anxiety disorder


(4) Dementia, vascular, with delusions











DIANA STALLINGS MD Jul 26, 2018 17:28

## 2018-07-27 VITALS — DIASTOLIC BLOOD PRESSURE: 73 MMHG | SYSTOLIC BLOOD PRESSURE: 134 MMHG

## 2018-07-27 LAB
ANION GAP SERPL CALC-SCNC: 5 MMOL/L (ref 6–14)
CA-I SERPL ISE-MCNC: 18 MG/DL (ref 8–26)
CALCIUM SERPL-MCNC: 8.7 MG/DL (ref 8.5–10.1)
CHLORIDE SERPL-SCNC: 113 MMOL/L (ref 98–107)
CO2 SERPL-SCNC: 25 MMOL/L (ref 21–32)
CREAT SERPL-MCNC: 0.9 MG/DL (ref 0.7–1.3)
GFR SERPLBLD BASED ON 1.73 SQ M-ARVRAT: 82.7 ML/MIN
GLUCOSE SERPL-MCNC: 99 MG/DL (ref 70–99)
POTASSIUM SERPL-SCNC: 3.7 MMOL/L (ref 3.5–5.1)
SODIUM SERPL-SCNC: 143 MMOL/L (ref 136–145)

## 2018-07-27 RX ADMIN — IPRATROPIUM BROMIDE AND ALBUTEROL SULFATE SCH ML: .5; 3 SOLUTION RESPIRATORY (INHALATION) at 05:28

## 2018-07-27 RX ADMIN — FINASTERIDE SCH MG: 5 TABLET, FILM COATED ORAL at 08:34

## 2018-07-27 RX ADMIN — BACLOFEN SCH MG: 10 TABLET ORAL at 08:34

## 2018-07-27 RX ADMIN — GABAPENTIN SCH MG: 100 CAPSULE ORAL at 08:34

## 2018-07-27 RX ADMIN — Medication SCH CAP: at 08:34

## 2018-07-27 RX ADMIN — MEROPENEM SCH MLS/HR: 500 INJECTION, POWDER, FOR SOLUTION INTRAVENOUS at 07:55

## 2018-07-27 RX ADMIN — FAMOTIDINE SCH MG: 20 TABLET ORAL at 08:34

## 2018-07-27 RX ADMIN — DEXTROSE AND POTASSIUM CHLORIDE SCH MLS/HR: 5; .3 SOLUTION INTRAVENOUS at 04:06

## 2018-07-27 RX ADMIN — OXYCODONE HYDROCHLORIDE AND ACETAMINOPHEN SCH MG: 500 TABLET ORAL at 08:34

## 2018-07-27 RX ADMIN — CETIRIZINE HYDROCHLORIDE SCH MG: 10 TABLET, FILM COATED ORAL at 08:40

## 2018-07-27 RX ADMIN — MULTIPLE VITAMINS W/ MINERALS TAB SCH TAB: TAB at 08:30

## 2018-07-27 RX ADMIN — LITHIUM CARBONATE SCH MG: 300 TABLET ORAL at 08:46

## 2018-07-27 RX ADMIN — LEVOTHYROXINE SODIUM SCH MCG: 50 TABLET ORAL at 07:59

## 2018-07-27 RX ADMIN — SENNOSIDES AND DOCUSATE SODIUM SCH TAB: 8.6; 5 TABLET ORAL at 08:34

## 2018-07-27 RX ADMIN — POLYETHYLENE GLYCOL 3350 SCH GM: 17 POWDER, FOR SOLUTION ORAL at 08:30

## 2018-07-27 NOTE — NUR
Discharge Note:



[facility]ILC[facility]



Discharge instructions and discharge home medications reviewed with Corinne, [facility] nurse 
and a copy given. All questions have been answered and understanding verbalized. 



The following instructions and handouts were given: discussed medications, wound care, diet, 
activity, and plan of care.



Discontinued lines and drains: IV left intact for use at [facility].



Patient discharged to [facility] via EMS.

## 2018-07-27 NOTE — PN
DATE:  07/25/2018



This late entry 07/25/2018 covers elements not covered in my initial note.



SUBJECTIVE:  I met with the patient in the evening of 07/25/2018.  Per nursing

report, the patient is doing better.  Individually as I met with him, he seemed

a little more awake, alert, but still did not seem to recognize me.



REVIEW OF SYSTEMS:  Positive for tiredness.  No CV, , eye, ENT system symptoms

on review.  Reliability poor.



MENTAL STATUS EXAM:  Oriented to himself and situation.  Insight, judgment,

recent and remote memory, attention, concentration, fund of knowledge poor,

consistent with his diagnosis.



IMPRESSION:  Bipolar 1 disorder, mixed with psychotic features; major

neurocognitive disorder, Alzheimer, vascular with delusion, depression.  Rest

unchanged.



PLAN:  Lithium level is 0.5.  Given his overall general medical condition, this

is adequate, especially since we are not seeing any significant manic or

depressive symptoms.  We will reassess as he medically stabilizes to determine

if we need to increase the lithium to reach a level of approximately 0.7. 

Continue rest unchanged.





______________________________

MAN COBY STALLINGS MD



DR:  BRIDGETT/timbo  JOB#:  [id number] / [id number]

DD:  07/26/2018 17:22  DT:  07/27/2018 02:13

## 2018-07-27 NOTE — PN
DATE:  07/26/2018



SUBJECTIVE:  The patient is resting, slightly propped up in bed, in no apparent

distress.  He denied any complaint.  The nursing staff stated that he is

constantly oozing loose bowel movement.  He is now on a low air loss mattress

for his sacral decubitus ulcer.



PHYSICAL EXAMINATION:

GENERAL:  When I examined him, he looked pale, cachectic, but no jaundice,

cyanosis, or thyromegaly.  No jugular venous distension.  No limb edema.

VITAL SIGNS:  His heart rate was 64, blood pressure 106/61, temperature was 98,

respiratory rate 20, and oxygen saturation was 100%, on 2 liters of oxygen by

nasal cannula.

HEAD, EYES, EARS, NOSE AND THROAT:  Showed normocephalic, atraumatic.

NECK:  Supple.

HEART:  Showed normal first and second sounds.  No gallop, rub or murmur.

CHEST:  Clear to auscultation.  No crepitation or rhonchi.

ABDOMEN:  Scaphoid, soft, and nontender.

NEUROLOGIC:  He is awake, alert, but very confused.  All his cranial nerves are

intact.  He has quadriplegia with marked muscle wasting, fixed flexion

contraction of both upper and lower extremities, has large sacrococcygeal

decubitus ulcer.



His intake was 1700, output was 1100.



LABORATORY DATA:  As of this morning showed a serum sodium 146, potassium 3.4,

chloride 115, bicarbonate 23, anion gap of 8, BUN 22, creatinine 1, estimated

GFR was 73 mL per minute, his glucose 110, and calcium was 8.1.  Total

bilirubin, AST, ALT, alkaline phosphatase were normal.  His C-reactive protein

was 22.7 mg/dL.  Total protein 6, albumin was 1.9.  His white cell count was

8000, hemoglobin 10, hematocrit 30, MCV 91, and platelet count of 178,000.  Sed

rate was 20 mmHg.  His blood cultures are still negative.  His urine culture has

grown greater than 100,000 colony-forming units per mL of gram-negative rods. 

The identification and sensitivity is still pending.



ASSESSMENT:  In summary, the patient was admitted with,

1.  Acute hypoxic respiratory failure.

2.  Healthcare-associated pneumonia.

3.  Urinary tract infection.

4.  Sacral decubitus ulcer.

5.  Quadriplegia with neurogenic bladder requiring indwelling Gordon catheter.

6.  Hypothyroidism.

7.  Benign prostatic hypertrophy with bladder outlet obstruction.



PLAN:  To continue intravenous antibiotic in the form of vancomycin, meropenem,

and levofloxacin.  Continue nutritional support given that he has severe

protein-calorie malnutrition.  Thank you for Dr. Dias for his input, who

recommended to continue with the current psychotropic agent and to monitor his

lithium levels.





______________________________

SAMIR SCOTT MD



DR:  SHAWN/timbo  JOB#:  [id number] / [id number]

DD:  07/26/2018 12:45  DT:  07/27/2018 00:11

## 2018-07-28 NOTE — DS
DATE OF DISCHARGE:  07/27/2018



HOSPITAL COURSE:  The patient is a 72-year-old  male patient, a

resident at [facility] who was brought to the Emergency Room of

[facility] with altered mental status.  He was found to be febrile and

further evaluation showed that he has bilateral lung infiltrate, questionable

urinary tract infection and a large sacral decubitus ulcer.  The patient was

admitted with sepsis secondary to bilateral lower lobe infiltrate as well as

urinary tract infection was treated with IV vancomycin as well as Levaquin as he

is ALLERGIC TO PENICILLIN.  He was also seen by the wound care team for his

sacral decubitus ulcer and as he requires IV antibiotic, wound care and

nutritional support, the decision was made to transfer [facility] to [facility] to continue all these measures.



PHYSICAL EXAMINATION:

GENERAL:  When I saw him prior to discharge, he was resting slightly propped up

in bed, in no apparent respiratory distress.  He was pale, cachectic, but no

jaundice, cyanosis, lymphadenopathy or thyromegaly.  No jugular venous

distension.  No limb edema.

VITAL SIGNS:  His heart rate was 63, blood pressure was 134/73, temperature was

97.8, respiratory rate 22, and oxygen saturation was 96% on 2 liters of oxygen

by nasal cannula.

HEAD, EYES, EARS, NOSE AND THROAT:  Showed normocephalic, atraumatic.

NECK:  Supple.

HEART:  Showed normal first and second sounds.  No gallop, rub or murmur.

CHEST:  Shows central trachea, equally reduced expansion, reduced air entry,

vesicular sounds with crepitation mostly on both sides posteriorly.  I could not

appreciate any rhonchi.

ABDOMEN:  Scaphoid, soft, nontender.

NEUROLOGIC:  He is confused; however, all his cranial nerves are intact.  He has

quadriplegia with marked muscle wasting and fixed flexion contraction of her

both upper and lower extremities. 

GENITOURINARY:  He has neurogenic bladder requiring indwelling Gordon catheter. 

He has large sacral decubitus ulcer.



LABORATORY DATA:  Show his white cell count is down to 8000, hemoglobin 9.9,

hematocrit 31, MCV 91, and platelet count of 178,000.  His chemistry showed a

serum sodium of 143, potassium 3.7, chloride 113, bicarbonate 25, anion gap of

5, BUN 18, creatinine 0.9, estimated GFR was 83 mL per minute.  His glucose was

99, calcium was 8.7.  His total protein was 6, albumin is only 1.9 g/dL.  His

C-reactive protein was 22.7 mg/dL and his sedimentation rate was 20 mm per hour.

 His blood cultures are so far negative.  His urine culture showed growth of

more than 100,000 colony-forming units per mL of Morganella morganii that is

sensitive to meropenem and intermediate sensitivity to ciprofloxacin. 



DISCHARGE MEDICATIONS:  He was transferred to [facility] to

continue on following medication:  Vitamin D 50,000 units once a week,

vancomycin 1000 mg IV q. 12 hourly, D5 with 40 mEq of potassium chloride IV at

75 mL per hour, polyethylene glycol 17 grams twice a day, lithium carbonate 150

mg daily, finasteride 5 mg daily, cetirizine 10 mg daily, ascorbic acid 500 mg

daily and multivitamin 1 tablet once a day, albuterol/ipratropium bromide by

nebulizer 4 times a day, levothyroxine sodium 50 mcg once a day, terazosin 10 mg

daily, Senna-S 1 tablet twice a day, risperidone 1.5 mg at bedtime, lithium

carbonate 300 mg at bedtime, lactobacillus rhamnosus 1 tablet twice a day and

famotidine 20 mg twice a day, baclofen 10 mg twice a day, mirtazapine 50 mg at

bedtime, fentanyl citrate 50 mcg IV every 2 hours.  Docusate sodium 100 mg twice

a day, gabapentin 100 mg 3 times a day.  He is on meropenem 500 mg IV q. 8

hourly, trazodone 50 mg at bedtime, oxycodone 5 mg every 6 hours, lorazepam 0.5

mg 3 times a day as needed for anxiety and bisacodyl 10 mg suppositories

rectally daily p.r.n. for constipation, simethicone 80 mg every 8 hours as

needed, ondansetron 4 mg every 8 hours, olanzapine 5 mg every 2 hours as needed

for agitation, Mylanta 30 mL as needed every 2 hours for indigestion, Tylenol

650 mg every 6 hours as needed for pain or fever.



FINAL DISCHARGE DIAGNOSES:

1.  Acute hypoxic respiratory failure.

2.  Healthcare-associated pneumonia.

3.  Urinary tract infection.

4.  Sacral decubitus ulcer.

5.  Quadriplegia with neurogenic bladder requiring indwelling Gordon catheter.

6.  Hypothyroidism.

7.  Benign prostatic hypertrophy with bladder outlet obstruction.





______________________________

SAMIR SCOTT MD



DR:  Niles  JOB#:  [id number] / [id number]

DD:  07/28/2018 10:20  DT:  07/28/2018 14:49

## 2022-07-12 NOTE — PN
DATE:  06/05/2018



This is a late entry, 06/05/2018, covers the elements not covered in my initial

note, 06/05/2018.



SUBJECTIVE:  I met with the patient in the evening.  Per nursing report, the

patient has been screaming out quite anxious, having different somatic

complaints and wanting assistance by the nursing staff for things like Chapstick

and wanting water repeatedly and wanting someone to rub his leg.  Other than

this, he is reasonably, cognitively intact and appropriate.



REVIEW OF SYSTEMS:  Ambulation impaired, in a Broda chair.  He has vague somatic

symptoms as above.  No CV, , pulmonary, eye system symptoms on review.



MENTAL STATUS EXAM:  Oriented to himself, situation, and place.  He is aware of

the year and month, not the date.  Speech coherent, less pressured.  Abstraction

fair, computation impaired, language function intact.  Mood and affect generally

better, less anxious.  No psychotic symptoms.



IMPRESSION:  Bipolar 1 disorder, mixed with psychotic features, in partial

remission.



PLAN:  Continue psychotropics from initial note.





______________________________

MAN COBY STALLINGS MD



DR:  BRIDGETT/timbo  JOB#:  [id number] / [id number]

DD:  06/06/2018 08:41  DT:  06/06/2018 10:50 Please call pt to schedule EGD/Colonoscopy with Dr. Payne.     Should have 48 hours of clear liquid diet prior to procedure.  Patient should also have 2-3 days of MiraLax 17 g b.i.d. prior to day of prep.       Schedule Procedure:   Please Schedule Routine  Procedure: Colonoscopy ([id number]) with NuLytely and EGD ([id number])  Diagnosis: Colon Cancer Screening Z12.11, Diarrhea R19.7, Gastric Ulcer K25.9 and Gastroesophageal Reflux Disease without Esophagitis K21.9  Is patient:             Diabetic? No             ANTIPLATELET / ANTICOAGULATION: MEDICATION:  None  Latex allergy: No  Sleep apnea: No  Location: [facility]  Special Instructions:   MAC Anesthesia   For MAC/GA patients if applicable, please verify to hold medications prior to procedure: Selegiline patches x 10 days  Sildenafil, Verdenafil, Tadalafil x 48 hours   Monoamine oxidase inhibitor (MAOIs), angiotensin receptor blockers, renin inhibitors, ACE inhibitors, diuretics (unless in combination with beta blocker) day of procedure           [facility]